# Patient Record
Sex: FEMALE | Race: WHITE | NOT HISPANIC OR LATINO | Employment: OTHER | URBAN - METROPOLITAN AREA
[De-identification: names, ages, dates, MRNs, and addresses within clinical notes are randomized per-mention and may not be internally consistent; named-entity substitution may affect disease eponyms.]

---

## 2017-05-10 DIAGNOSIS — I48.0 PAROXYSMAL ATRIAL FIBRILLATION: Primary | ICD-10-CM

## 2017-05-16 ENCOUNTER — HOSPITAL ENCOUNTER (OUTPATIENT)
Dept: CARDIOLOGY | Facility: HOSPITAL | Age: 72
Discharge: HOME | End: 2017-05-16
Payer: MEDICARE

## 2017-05-16 DIAGNOSIS — I48.0 PAROXYSMAL ATRIAL FIBRILLATION: ICD-10-CM

## 2017-05-16 LAB
ESTIMATED PA SYSTOLIC PRESSURE: 23.23
GLOBAL PERICARDIAL EFFUSION: ABNORMAL
MITRAL VALVE REGURGITATION: ABNORMAL
RETIRED EF AND QEF - SEE NOTES: 10 (ref 55–65)
TRICUSPID VALVE REGURGITATION: ABNORMAL

## 2017-05-16 PROCEDURE — 93306 TTE W/DOPPLER COMPLETE: CPT | Mod: PO

## 2017-05-16 PROCEDURE — 93306 TTE W/DOPPLER COMPLETE: CPT | Mod: 26,,, | Performed by: INTERNAL MEDICINE

## 2017-06-06 ENCOUNTER — HOSPITAL ENCOUNTER (OUTPATIENT)
Dept: RADIOLOGY | Facility: HOSPITAL | Age: 72
Discharge: HOME | End: 2017-06-06
Attending: SPECIALIST
Payer: MEDICARE

## 2017-06-06 ENCOUNTER — HOSPITAL ENCOUNTER (OUTPATIENT)
Dept: CARDIOLOGY | Facility: HOSPITAL | Age: 72
Discharge: HOME | End: 2017-06-06
Attending: SPECIALIST
Payer: MEDICARE

## 2017-06-06 DIAGNOSIS — I42.9 CARDIOMYOPATHY: ICD-10-CM

## 2017-06-06 DIAGNOSIS — I25.9 MYOCARDIAL ISCHEMIA: ICD-10-CM

## 2017-06-06 LAB — DIASTOLIC DYSFUNCTION: NO

## 2017-06-06 PROCEDURE — 78452 HT MUSCLE IMAGE SPECT MULT: CPT | Mod: TC,PO

## 2017-06-06 PROCEDURE — 93016 CV STRESS TEST SUPVJ ONLY: CPT | Mod: ,,, | Performed by: INTERNAL MEDICINE

## 2017-06-06 PROCEDURE — 93018 CV STRESS TEST I&R ONLY: CPT | Mod: ,,, | Performed by: INTERNAL MEDICINE

## 2017-06-06 RX ORDER — REGADENOSON 0.08 MG/ML
INJECTION, SOLUTION INTRAVENOUS
Status: DISPENSED
Start: 2017-06-06 | End: 2017-06-06

## 2017-06-30 ENCOUNTER — TELEPHONE (OUTPATIENT)
Dept: CARDIOLOGY | Facility: CLINIC | Age: 72
End: 2017-06-30

## 2017-06-30 NOTE — TELEPHONE ENCOUNTER
"Contacted patient.  [facility] for abnormal stress test, depressed EF.  Patient stated she does not want a consultation appointment at [place] nor [facility] and would like to proceed with procedure.  Informed patient she will be contacted with a date/time of procedure at [facility] in [place].  Patient stated she would like to be "put to sleep" for this procedure and does not want to feel any pain due to stories she has heard from people that has experienced this.  Patient voiced understanding.  "

## 2017-07-03 ENCOUNTER — TELEPHONE (OUTPATIENT)
Dept: CARDIOLOGY | Facility: CLINIC | Age: 72
End: 2017-07-03

## 2017-07-03 NOTE — TELEPHONE ENCOUNTER
----- Message from Monisha Bhatt sent at 7/3/2017  1:29 PM CDT -----  Contact: [phone]  Patient states she was told to schedule an angio gram and do not want to see the dr. She just want to schedule the angio gram

## 2017-07-06 ENCOUNTER — LAB VISIT (OUTPATIENT)
Dept: LAB | Facility: HOSPITAL | Age: 72
End: 2017-07-06
Attending: SPECIALIST
Payer: MEDICARE

## 2017-07-06 DIAGNOSIS — I48.0 PAROXYSMAL ATRIAL FIBRILLATION: Primary | ICD-10-CM

## 2017-07-06 LAB
INR PPP: 2.1
PROTHROMBIN TIME: 22.8 SEC

## 2017-07-06 PROCEDURE — 36415 COLL VENOUS BLD VENIPUNCTURE: CPT | Mod: PO

## 2017-07-06 PROCEDURE — 85610 PROTHROMBIN TIME: CPT | Mod: PO

## 2017-07-10 ENCOUNTER — TELEPHONE (OUTPATIENT)
Dept: CARDIOLOGY | Facility: CLINIC | Age: 72
End: 2017-07-10

## 2017-07-10 DIAGNOSIS — I25.5 ISCHEMIC CARDIOMYOPATHY: Primary | ICD-10-CM

## 2017-07-10 NOTE — TELEPHONE ENCOUNTER
Returned call, spoke with patient.  Patient asked me to disregard the call she will wait until Dr Lakhani's office call her.

## 2017-07-10 NOTE — TELEPHONE ENCOUNTER
----- Message from Peggy Leeanna sent at 7/10/2017  3:28 PM CDT -----  Contact: [phone] or [phone] /self   Patient would like to speak with you about scheduling angiogram. She was referred by her doctor to Dr Ochoa but has decided to go with a different doctor. Please advise.

## 2017-07-11 DIAGNOSIS — R94.39 ABNORMAL STRESS TEST: Primary | ICD-10-CM

## 2017-07-11 RX ORDER — DIPHENHYDRAMINE HCL 50 MG
50 CAPSULE ORAL ONCE
Status: CANCELLED | OUTPATIENT
Start: 2017-07-11 | End: 2017-07-11

## 2017-07-11 NOTE — PRE ADMISSION SCREENING
Called and spoke w pt. Pre procedure instructions given. Arrival time 730am on 7/19. Pt instructed to hold Coumadin x4 days. Pt verbalized understanding and all questions answered.

## 2017-07-18 NOTE — PRE ADMISSION SCREENING
Called and spoke with pt, confirmed arrival time 730am and pt has been holding coumadin. All questions answered.

## 2017-07-19 ENCOUNTER — HOSPITAL ENCOUNTER (OUTPATIENT)
Facility: HOSPITAL | Age: 72
Discharge: HOME | End: 2017-07-19
Attending: INTERNAL MEDICINE | Admitting: INTERNAL MEDICINE
Payer: MEDICARE

## 2017-07-19 ENCOUNTER — SURGERY (OUTPATIENT)
Age: 72
End: 2017-07-19

## 2017-07-19 VITALS
TEMPERATURE: 98 F | BODY MASS INDEX: 32.1 KG/M2 | WEIGHT: 170 LBS | SYSTOLIC BLOOD PRESSURE: 94 MMHG | OXYGEN SATURATION: 97 % | RESPIRATION RATE: 14 BRPM | HEIGHT: 61 IN | HEART RATE: 110 BPM | DIASTOLIC BLOOD PRESSURE: 50 MMHG

## 2017-07-19 DIAGNOSIS — I42.8 NICM (NONISCHEMIC CARDIOMYOPATHY): Primary | ICD-10-CM

## 2017-07-19 DIAGNOSIS — R94.39 ABNORMAL STRESS TEST: ICD-10-CM

## 2017-07-19 DIAGNOSIS — N39.0 URINARY TRACT INFECTION: ICD-10-CM

## 2017-07-19 LAB
ANION GAP SERPL CALC-SCNC: 11 MMOL/L
BASOPHILS # BLD AUTO: 0.02 K/UL
BASOPHILS NFR BLD: 0.2 %
BUN SERPL-MCNC: 35 MG/DL
CALCIUM SERPL-MCNC: 10.1 MG/DL
CHLORIDE SERPL-SCNC: 99 MMOL/L
CO2 SERPL-SCNC: 27 MMOL/L
CREAT SERPL-MCNC: 1.5 MG/DL
DIFFERENTIAL METHOD: ABNORMAL
EOSINOPHIL # BLD AUTO: 0.1 K/UL
EOSINOPHIL NFR BLD: 1.2 %
ERYTHROCYTE [DISTWIDTH] IN BLOOD BY AUTOMATED COUNT: 14 %
EST. GFR  (AFRICAN AMERICAN): 40 ML/MIN/1.73 M^2
EST. GFR  (NON AFRICAN AMERICAN): 34 ML/MIN/1.73 M^2
GLUCOSE SERPL-MCNC: 168 MG/DL
HCT VFR BLD AUTO: 48 %
HGB BLD-MCNC: 16.4 G/DL
INR PPP: 1.2
LYMPHOCYTES # BLD AUTO: 3.3 K/UL
LYMPHOCYTES NFR BLD: 28 %
MCH RBC QN AUTO: 30.6 PG
MCHC RBC AUTO-ENTMCNC: 34.2 %
MCV RBC AUTO: 90 FL
MONOCYTES # BLD AUTO: 0.8 K/UL
MONOCYTES NFR BLD: 6.9 %
NEUTROPHILS # BLD AUTO: 7.4 K/UL
NEUTROPHILS NFR BLD: 63.5 %
PLATELET # BLD AUTO: 216 K/UL
PMV BLD AUTO: 10 FL
POTASSIUM SERPL-SCNC: 4.9 MMOL/L
PROTHROMBIN TIME: 12.4 SEC
RBC # BLD AUTO: 5.36 M/UL
SODIUM SERPL-SCNC: 137 MMOL/L
WBC # BLD AUTO: 11.66 K/UL

## 2017-07-19 PROCEDURE — 93005 ELECTROCARDIOGRAM TRACING: CPT

## 2017-07-19 PROCEDURE — 85025 COMPLETE CBC W/AUTO DIFF WBC: CPT

## 2017-07-19 PROCEDURE — 63600175 PHARM REV CODE 636 W HCPCS

## 2017-07-19 PROCEDURE — 99152 MOD SED SAME PHYS/QHP 5/>YRS: CPT

## 2017-07-19 PROCEDURE — 25500020 PHARM REV CODE 255

## 2017-07-19 PROCEDURE — 25000003 PHARM REV CODE 250

## 2017-07-19 PROCEDURE — 99152 MOD SED SAME PHYS/QHP 5/>YRS: CPT | Mod: ,,, | Performed by: INTERNAL MEDICINE

## 2017-07-19 PROCEDURE — 85610 PROTHROMBIN TIME: CPT

## 2017-07-19 PROCEDURE — 80048 BASIC METABOLIC PNL TOTAL CA: CPT

## 2017-07-19 PROCEDURE — 25000003 PHARM REV CODE 250: Performed by: INTERNAL MEDICINE

## 2017-07-19 PROCEDURE — 93458 L HRT ARTERY/VENTRICLE ANGIO: CPT | Mod: 26,,, | Performed by: INTERNAL MEDICINE

## 2017-07-19 PROCEDURE — 27200085 CATH LAB PROCEDURE

## 2017-07-19 RX ORDER — DIPHENHYDRAMINE HCL 50 MG
50 CAPSULE ORAL ONCE
Status: DISCONTINUED | OUTPATIENT
Start: 2017-07-19 | End: 2017-07-19 | Stop reason: HOSPADM

## 2017-07-19 RX ORDER — ACETAMINOPHEN 325 MG/1
650 TABLET ORAL EVERY 6 HOURS PRN
Status: DISCONTINUED | OUTPATIENT
Start: 2017-07-19 | End: 2017-07-19 | Stop reason: HOSPADM

## 2017-07-19 RX ORDER — ONDANSETRON 2 MG/ML
4 INJECTION INTRAMUSCULAR; INTRAVENOUS EVERY 12 HOURS PRN
Status: DISCONTINUED | OUTPATIENT
Start: 2017-07-19 | End: 2017-07-19 | Stop reason: HOSPADM

## 2017-07-19 RX ORDER — SODIUM CHLORIDE 9 MG/ML
5 INJECTION, SOLUTION INTRAVENOUS CONTINUOUS
Status: DISCONTINUED | OUTPATIENT
Start: 2017-07-19 | End: 2017-07-19 | Stop reason: HOSPADM

## 2017-07-19 RX ADMIN — SODIUM CHLORIDE 5 ML/KG/HR: 0.9 INJECTION, SOLUTION INTRAVENOUS at 11:07

## 2017-07-19 NOTE — DISCHARGE SUMMARY
[facility]  Discharge Summary      Admit Date: 7/19/2017    Discharge Date and Time:  07/19/2017 12:15 PM    Attending Physician: Chris Ochoa MD     Reason for Admission: [facility]    Procedures Performed: Procedure(s) (LRB):  HEART CATH-LEFT (N/A)    Hospital Course (synopsis of major diagnoses, care, treatment, and services provided during the course of the hospital stay): The patient was taken to the cath lab and LHC revealed normal coronaries with severely depressed LV EF. Please see full report for details. No immediate post procedure complications and pt d/c'd home. Pt to f/u with Dr Whitlock for management of AFib and NICM.       Final Diagnoses:    Principal Problem: NICM   Secondary Diagnoses:   Active Hospital Problems    Diagnosis  POA    Abnormal stress test [R94.39]  Yes     Priority: High      Resolved Hospital Problems    Diagnosis Date Resolved POA   No resolved problems to display.   Afib  Chronic anticoagulation  HLD  DM    Discharged Condition: good    Disposition: Home or Self Care    Follow Up/Patient Instructions:     Medications:  Reconciled Home Medications:   Current Discharge Medication List      CONTINUE these medications which have NOT CHANGED    Details   CALCIUM & MAGNESIUM CARBONATES ORAL Take by mouth.      diclofenac sodium (VOLTAREN) 1 % Gel Apply 2 g topically every 6 (six) hours as needed. For pain.  Qty: 100 g, Refills: 1    Associated Diagnoses: Achilles tendonitis; Plantar fasciitis      INSULIN ASPART (NOVOLOG SUBQ) Inject into the skin.      INSULIN GLARGINE,HUM.REC.ANLOG (LANTUS SUBQ) Inject 40 Units into the skin once daily at 6am. Takes med at 6pm.      pravastatin (PRAVACHOL) 10 MG tablet Take 10 mg by mouth once daily.      propafenone (RHTHYMOL) 150 MG Tab Take 150 mg by mouth 3 (three) times daily.      tramadol 50 mg TbDL Take 50 mg by mouth as needed.      warfarin (COUMADIN) 3 MG tablet Take 3 mg by mouth Daily. Alternate 1 to 1/2 tab qd              Discharge Procedure Orders  Diet general   Order Specific Question Answer Comments   Additional restrictions: Cardiac (Low Na/Chol)      Activity as tolerated

## 2017-07-19 NOTE — H&P
[facility]  Cardiology  History and Physical     Patient Name: Yajaira Terry  MRN: [id number]  Admission Date: 2017  Code Status: No Order   Attending Provider: Chris Ochoa MD   Primary Care Physician: Savanna Whitlock MD  Principal Problem:<principal problem not specified>    Patient information was obtained from patient.     Subjective:     Chief Complaint:  Abnormal stress test     HPI:   73 y/o female with AFib on propafenone and chronic anticoagulation with coumadin, DM, HLD, tobacco use who presents for [facility]. Had 2DE with EF 10% with moderate MR with LOUISE 5.1 cm, had nuclear SPECT with infarct in the mid to apical anterior wall the LV, no ischemia, akinesis of the anteroseptal wall, mild hypokinesis of the inferior and lateral wall with depressed LV EF of 23%. She has had SOB and intermittent PND. Denies palps, syncope.     Past Medical History:   Diagnosis Date    Atrial fibrillation     Diabetes mellitus     Dyslipidemia     Osteoporosis, unspecified     Tobacco use     Urinary tract infection     Vaginal infection        Past Surgical History:   Procedure Laterality Date     SECTION          HYSTERECTOMY       - partial    INCONTINENCE SURGERY      KNEE SURGERY         Review of patient's allergies indicates:   Allergen Reactions    Sulfa (sulfonamide antibiotics)        No current facility-administered medications on file prior to encounter.      Current Outpatient Prescriptions on File Prior to Encounter   Medication Sig    CALCIUM & MAGNESIUM CARBONATES ORAL Take by mouth.    diclofenac sodium (VOLTAREN) 1 % Gel Apply 2 g topically every 6 (six) hours as needed. For pain.    INSULIN ASPART (NOVOLOG SUBQ) Inject into the skin.    INSULIN GLARGINE,HUM.REC.ANLOG (LANTUS SUBQ) Inject 40 Units into the skin once daily at 6am. Takes med at 6pm.    pravastatin (PRAVACHOL) 10 MG tablet Take 10 mg by mouth once daily.    propafenone (RHTHYMOL)  150 MG Tab Take 150 mg by mouth 3 (three) times daily.    tramadol 50 mg TbDL Take 50 mg by mouth as needed.    warfarin (COUMADIN) 3 MG tablet Take 3 mg by mouth Daily. Alternate 1 to 1/2 tab qd     Family History     Problem Relation (Age of Onset)    Kidney disease Maternal Grandmother        Social History Main Topics    Smoking status: Former Smoker     Quit date: 5/19/2017    Smokeless tobacco: Never Used    Alcohol use No    Drug use: No    Sexual activity: Not Currently     Review of Systems   Constitution: Negative for weakness and malaise/fatigue.   HENT: Negative for congestion and headaches.    Eyes: Negative for blurred vision.   Cardiovascular: Positive for dyspnea on exertion and paroxysmal nocturnal dyspnea. Negative for chest pain, claudication, cyanosis, irregular heartbeat, near-syncope, palpitations and syncope.   Respiratory: Positive for shortness of breath.    Endocrine: Negative for polyuria.   Hematologic/Lymphatic: Negative for bleeding problem.   Skin: Negative for itching and rash.   Musculoskeletal: Negative for joint swelling, muscle cramps and muscle weakness.   Gastrointestinal: Negative for abdominal pain, hematemesis, hematochezia, melena, nausea and vomiting.   Genitourinary: Negative for dysuria and hematuria.   Neurological: Negative for dizziness, focal weakness, light-headedness and loss of balance.   Psychiatric/Behavioral: Negative for depression. The patient is not nervous/anxious.      Objective:     Vital Signs (Most Recent):  Temp: 98 °F (36.7 °C) (07/19/17 0755)  Pulse: 107 (07/19/17 1130)  Resp: 20 (07/19/17 1130)  BP: (!) 89/64 (07/19/17 1130)  SpO2: 96 % (07/19/17 1130) Vital Signs (24h Range):  Temp:  [98 °F (36.7 °C)] 98 °F (36.7 °C)  Pulse:  [] 107  Resp:  [15-24] 20  SpO2:  [93 %-96 %] 96 %  BP: (74-91)/(50-64) 89/64     Weight: 77.1 kg (170 lb)  Body mass index is 32.12 kg/m².    SpO2: 96 %  O2 Device (Oxygen Therapy): room air    No intake or output  data in the 24 hours ending 07/19/17 1154    Lines/Drains/Airways     Peripheral Intravenous Line                 Peripheral IV - Single Lumen 07/19/17 0814 Left Antecubital less than 1 day                Physical Exam   Constitutional: She is oriented to person, place, and time. She appears well-developed and well-nourished.   HENT:   Head: Normocephalic and atraumatic.   Neck: Neck supple. No JVD present.   Cardiovascular: An irregularly irregular rhythm present. Tachycardia present.    Murmur heard.   Systolic murmur is present with a grade of 2/6   Pulses:       Carotid pulses are 2+ on the right side, and 2+ on the left side.       Radial pulses are 2+ on the right side, and 2+ on the left side.        Femoral pulses are 2+ on the right side, and 2+ on the left side.       Dorsalis pedis pulses are 2+ on the right side, and 2+ on the left side.        Posterior tibial pulses are 2+ on the right side, and 2+ on the left side.   Pulmonary/Chest: Effort normal and breath sounds normal.   Abdominal: Soft. Bowel sounds are normal.   Musculoskeletal: She exhibits no edema or tenderness.   Neurological: She is alert and oriented to person, place, and time.   Skin: Skin is warm and dry.   Psychiatric: She has a normal mood and affect. Her behavior is normal. Thought content normal.       Significant Labs:   BMP:   Recent Labs  Lab 07/19/17  0755   *      K 4.9   CL 99   CO2 27   BUN 35*   CREATININE 1.5*   CALCIUM 10.1   , CMP   Recent Labs  Lab 07/19/17  0755      K 4.9   CL 99   CO2 27   *   BUN 35*   CREATININE 1.5*   CALCIUM 10.1   ANIONGAP 11   ESTGFRAFRICA 40*   EGFRNONAA 34*   , CBC   Recent Labs  Lab 07/19/17  0755   WBC 11.66   HGB 16.4*   HCT 48.0       and INR   Recent Labs  Lab 07/19/17  0755   INR 1.2       Assessment and Plan:     Active Diagnoses:    Diagnosis Date Noted POA    Abnormal stress test [R94.39] 07/19/2017 Yes      Problems Resolved During this Admission:     Diagnosis Date Noted Date Resolved POA     71 y/o female with hx and presentation as above. Will plan for LHC    -LHC (diagnostic only)  -RIght radial access with 4/5 slender  -FAINA, pigtail  -The procedure was discussed with the pt along with the risks/benefits and the pt voiced understanding. All questions were answered and written consents obtained.           VTE Risk Mitigation     None          Chris Ochoa MD  Cardiology   [facility]

## 2017-07-19 NOTE — NURSING
2cc air removed from right radial vasc band.  NO hematoma or bleeding noted.   Will continue to monitor

## 2017-07-19 NOTE — NURSING
Patient discharged to home as ordered. All discharge instructions, printed materials  given to patient.  Patient verbalized understanding of and agreement with all discharge instructions given.Pt d/c via wheelchair via private vehicle, (son).

## 2017-07-19 NOTE — H&P
Post Procedure Note: [facility]    The pt was brought to the cath lab and under sterile technique, Right radial access was obtained without difficulty under US guidance. Images were obtained in multiple views and normal coronaries with severely depressed LVEF noted. Please see full report for details. The pt tolerated the procedure well without complications. Radial band device used with successful hemostasis.     Vitals:    07/19/17 1100 07/19/17 1115 07/19/17 1130 07/19/17 1145   BP: (!) 91/50 (!) 89/54 (!) 89/64 (!) 95/51   BP Location: Left arm Left arm Left arm Left arm   Patient Position: Lying Lying Lying Lying   BP Method: Automatic Automatic Automatic Automatic   Pulse: 99 101 107 (!) 123   Resp: (!) 24 (!) 22 20 (!) 24   Temp:       TempSrc:       SpO2: 96% 96% 96% 95%   Weight:       Height:             Gen: NAD  Ext: 2+ radial pulse, no evidence of hematoma    Plan:  -Post cath care per protocol  -Evaluate and treat for NICM

## 2017-08-15 ENCOUNTER — LAB VISIT (OUTPATIENT)
Dept: LAB | Facility: HOSPITAL | Age: 72
End: 2017-08-15
Attending: SPECIALIST
Payer: MEDICARE

## 2017-08-15 DIAGNOSIS — I48.0 PAROXYSMAL ATRIAL FIBRILLATION: Primary | ICD-10-CM

## 2017-08-15 LAB
INR PPP: 1.7
PROTHROMBIN TIME: 18 SEC

## 2017-08-15 PROCEDURE — 36415 COLL VENOUS BLD VENIPUNCTURE: CPT | Mod: PO

## 2017-08-15 PROCEDURE — 85610 PROTHROMBIN TIME: CPT | Mod: PO

## 2017-10-25 ENCOUNTER — LAB VISIT (OUTPATIENT)
Dept: LAB | Facility: HOSPITAL | Age: 72
End: 2017-10-25
Attending: SPECIALIST
Payer: MEDICARE

## 2017-10-25 DIAGNOSIS — N39.0 URINARY TRACT INFECTIOUS DISEASE: ICD-10-CM

## 2017-10-25 DIAGNOSIS — Z79.01 LONG-TERM (CURRENT) USE OF ANTICOAGULANTS: Primary | ICD-10-CM

## 2017-10-25 LAB
BACTERIA #/AREA URNS AUTO: ABNORMAL /HPF
BILIRUB UR QL STRIP: NEGATIVE
CLARITY UR REFRACT.AUTO: ABNORMAL
COLOR UR AUTO: ABNORMAL
GLUCOSE UR QL STRIP: NEGATIVE
HGB UR QL STRIP: ABNORMAL
INR PPP: 4
KETONES UR QL STRIP: NEGATIVE
LEUKOCYTE ESTERASE UR QL STRIP: ABNORMAL
MICROSCOPIC COMMENT: ABNORMAL
NITRITE UR QL STRIP: POSITIVE
PH UR STRIP: 5 [PH] (ref 5–8)
PROT UR QL STRIP: ABNORMAL
PROTHROMBIN TIME: 43.3 SEC
RBC #/AREA URNS AUTO: 8 /HPF (ref 0–4)
SP GR UR STRIP: 1.01 (ref 1–1.03)
URN SPEC COLLECT METH UR: ABNORMAL
UROBILINOGEN UR STRIP-ACNC: NEGATIVE EU/DL
WBC #/AREA URNS AUTO: >100 /HPF (ref 0–5)

## 2017-10-25 PROCEDURE — 81000 URINALYSIS NONAUTO W/SCOPE: CPT | Mod: PO

## 2017-10-25 PROCEDURE — 87086 URINE CULTURE/COLONY COUNT: CPT | Mod: PO

## 2017-10-25 PROCEDURE — 85610 PROTHROMBIN TIME: CPT | Mod: PO

## 2017-10-25 PROCEDURE — 87088 URINE BACTERIA CULTURE: CPT | Mod: PO

## 2017-10-25 PROCEDURE — 87186 SC STD MICRODIL/AGAR DIL: CPT | Mod: PO

## 2017-10-25 PROCEDURE — 87077 CULTURE AEROBIC IDENTIFY: CPT | Mod: PO

## 2017-10-25 PROCEDURE — 36415 COLL VENOUS BLD VENIPUNCTURE: CPT | Mod: PO

## 2017-10-28 LAB — BACTERIA UR CULT: NORMAL

## 2017-11-02 ENCOUNTER — LAB VISIT (OUTPATIENT)
Dept: LAB | Facility: HOSPITAL | Age: 72
End: 2017-11-02
Attending: SPECIALIST
Payer: MEDICARE

## 2017-11-02 DIAGNOSIS — I48.91 ATRIAL FIBRILLATION: Primary | ICD-10-CM

## 2017-11-02 LAB
INR PPP: 1.3
PROTHROMBIN TIME: 13.9 SEC

## 2017-11-02 PROCEDURE — 85610 PROTHROMBIN TIME: CPT | Mod: PO

## 2017-11-02 PROCEDURE — 36415 COLL VENOUS BLD VENIPUNCTURE: CPT | Mod: PO

## 2018-01-12 ENCOUNTER — LAB VISIT (OUTPATIENT)
Dept: LAB | Facility: HOSPITAL | Age: 73
End: 2018-01-12
Attending: SPECIALIST
Payer: MEDICARE

## 2018-01-12 DIAGNOSIS — I48.91 ATRIAL FIBRILLATION: Primary | ICD-10-CM

## 2018-01-12 LAB
INR PPP: 4.4
PROTHROMBIN TIME: 47 SEC

## 2018-01-12 PROCEDURE — 85610 PROTHROMBIN TIME: CPT | Mod: PO

## 2018-01-12 PROCEDURE — 36415 COLL VENOUS BLD VENIPUNCTURE: CPT | Mod: PO

## 2018-01-22 ENCOUNTER — LAB VISIT (OUTPATIENT)
Dept: LAB | Facility: HOSPITAL | Age: 73
End: 2018-01-22
Attending: SPECIALIST
Payer: MEDICARE

## 2018-01-22 DIAGNOSIS — I48.91 ATRIAL FIBRILLATION: Primary | ICD-10-CM

## 2018-01-22 LAB
INR PPP: 1.9
PROTHROMBIN TIME: 20.8 SEC

## 2018-01-22 PROCEDURE — 36415 COLL VENOUS BLD VENIPUNCTURE: CPT | Mod: PO

## 2018-01-22 PROCEDURE — 85610 PROTHROMBIN TIME: CPT | Mod: PO

## 2018-02-07 ENCOUNTER — LAB VISIT (OUTPATIENT)
Dept: LAB | Facility: HOSPITAL | Age: 73
End: 2018-02-07
Attending: SPECIALIST
Payer: MEDICARE

## 2018-02-07 DIAGNOSIS — I48.91 ATRIAL FIBRILLATION: Primary | ICD-10-CM

## 2018-02-07 LAB
INR PPP: 1.4
PROTHROMBIN TIME: 15.8 SEC

## 2018-02-07 PROCEDURE — 85610 PROTHROMBIN TIME: CPT | Mod: PO

## 2018-02-07 PROCEDURE — 36415 COLL VENOUS BLD VENIPUNCTURE: CPT | Mod: PO

## 2018-03-09 ENCOUNTER — LAB VISIT (OUTPATIENT)
Dept: LAB | Facility: HOSPITAL | Age: 73
End: 2018-03-09
Attending: SPECIALIST
Payer: MEDICARE

## 2018-03-09 DIAGNOSIS — I48.91 ATRIAL FIBRILLATION: Primary | ICD-10-CM

## 2018-03-09 LAB
INR PPP: 1.4
PROTHROMBIN TIME: 15.6 SEC

## 2018-03-09 PROCEDURE — 85610 PROTHROMBIN TIME: CPT | Mod: PO

## 2018-03-09 PROCEDURE — 36415 COLL VENOUS BLD VENIPUNCTURE: CPT | Mod: PO

## 2018-04-09 ENCOUNTER — LAB VISIT (OUTPATIENT)
Dept: LAB | Facility: HOSPITAL | Age: 73
End: 2018-04-09
Attending: SPECIALIST
Payer: MEDICARE

## 2018-04-09 DIAGNOSIS — I48.91 ATRIAL FIBRILLATION: ICD-10-CM

## 2018-04-09 LAB
ALBUMIN SERPL BCP-MCNC: 4.1 G/DL
ALP SERPL-CCNC: 138 U/L
ALT SERPL W/O P-5'-P-CCNC: 27 U/L
ANION GAP SERPL CALC-SCNC: 11 MMOL/L
AST SERPL-CCNC: 24 U/L
BASOPHILS # BLD AUTO: 0.03 K/UL
BASOPHILS NFR BLD: 0.4 %
BILIRUB SERPL-MCNC: 0.8 MG/DL
BUN SERPL-MCNC: 23 MG/DL
CALCIUM SERPL-MCNC: 9.4 MG/DL
CHLORIDE SERPL-SCNC: 104 MMOL/L
CHOLEST SERPL-MCNC: 143 MG/DL
CHOLEST/HDLC SERPL: 3.9 {RATIO}
CO2 SERPL-SCNC: 29 MMOL/L
CREAT SERPL-MCNC: 0.94 MG/DL
DIFFERENTIAL METHOD: ABNORMAL
EOSINOPHIL # BLD AUTO: 0.2 K/UL
EOSINOPHIL NFR BLD: 2.2 %
ERYTHROCYTE [DISTWIDTH] IN BLOOD BY AUTOMATED COUNT: 12.3 %
EST. GFR  (AFRICAN AMERICAN): >60 ML/MIN/1.73 M^2
EST. GFR  (NON AFRICAN AMERICAN): >60 ML/MIN/1.73 M^2
ESTIMATED AVG GLUCOSE: 192 MG/DL
GLUCOSE SERPL-MCNC: 164 MG/DL
HBA1C MFR BLD HPLC: 8.3 %
HCT VFR BLD AUTO: 46.5 %
HDLC SERPL-MCNC: 37 MG/DL
HDLC SERPL: 25.9 %
HGB BLD-MCNC: 15.3 G/DL
INR PPP: 1.6
LDLC SERPL CALC-MCNC: 77 MG/DL
LYMPHOCYTES # BLD AUTO: 2.1 K/UL
LYMPHOCYTES NFR BLD: 26.4 %
MCH RBC QN AUTO: 31.2 PG
MCHC RBC AUTO-ENTMCNC: 32.9 G/DL
MCV RBC AUTO: 95 FL
MONOCYTES # BLD AUTO: 0.6 K/UL
MONOCYTES NFR BLD: 7.6 %
NEUTROPHILS # BLD AUTO: 4.9 K/UL
NEUTROPHILS NFR BLD: 63.1 %
NONHDLC SERPL-MCNC: 106 MG/DL
NT-PROBNP: 684 PG/ML
PLATELET # BLD AUTO: 233 K/UL
PMV BLD AUTO: 9.9 FL
POTASSIUM SERPL-SCNC: 4.4 MMOL/L
PROT SERPL-MCNC: 7.3 G/DL
PROTHROMBIN TIME: 17.1 SEC
RBC # BLD AUTO: 4.9 M/UL
SODIUM SERPL-SCNC: 144 MMOL/L
TRIGL SERPL-MCNC: 145 MG/DL
WBC # BLD AUTO: 7.81 K/UL

## 2018-04-09 PROCEDURE — 83880 ASSAY OF NATRIURETIC PEPTIDE: CPT | Mod: PO

## 2018-04-09 PROCEDURE — 85610 PROTHROMBIN TIME: CPT | Mod: PO

## 2018-04-09 PROCEDURE — 85025 COMPLETE CBC W/AUTO DIFF WBC: CPT | Mod: PO

## 2018-04-09 PROCEDURE — 80061 LIPID PANEL: CPT | Mod: PO

## 2018-04-09 PROCEDURE — 80053 COMPREHEN METABOLIC PANEL: CPT | Mod: PO

## 2018-04-09 PROCEDURE — 83036 HEMOGLOBIN GLYCOSYLATED A1C: CPT

## 2018-04-09 PROCEDURE — 36415 COLL VENOUS BLD VENIPUNCTURE: CPT | Mod: PO

## 2018-05-08 ENCOUNTER — LAB VISIT (OUTPATIENT)
Dept: LAB | Facility: HOSPITAL | Age: 73
End: 2018-05-08
Attending: SPECIALIST
Payer: MEDICARE

## 2018-05-08 DIAGNOSIS — I48.91 UNSPECIFIED ATRIAL FIBRILLATION: Primary | ICD-10-CM

## 2018-05-08 LAB
INR PPP: 1.6
PROTHROMBIN TIME: 17.7 SEC

## 2018-05-08 PROCEDURE — 85610 PROTHROMBIN TIME: CPT | Mod: PO

## 2018-05-08 PROCEDURE — 36415 COLL VENOUS BLD VENIPUNCTURE: CPT | Mod: PO

## 2018-06-08 ENCOUNTER — LAB VISIT (OUTPATIENT)
Dept: LAB | Facility: HOSPITAL | Age: 73
End: 2018-06-08
Attending: SPECIALIST
Payer: MEDICARE

## 2018-06-08 DIAGNOSIS — I48.91 UNSPECIFIED ATRIAL FIBRILLATION: Primary | ICD-10-CM

## 2018-06-08 DIAGNOSIS — I48.91 ATRIAL FIBRILLATION: ICD-10-CM

## 2018-06-08 LAB
INR PPP: 1.5
PROTHROMBIN TIME: 16.5 SEC

## 2018-06-08 PROCEDURE — 85610 PROTHROMBIN TIME: CPT | Mod: PO

## 2018-06-08 PROCEDURE — 36415 COLL VENOUS BLD VENIPUNCTURE: CPT | Mod: PO

## 2018-07-12 ENCOUNTER — LAB VISIT (OUTPATIENT)
Dept: LAB | Facility: HOSPITAL | Age: 73
End: 2018-07-12
Attending: SPECIALIST
Payer: MEDICARE

## 2018-07-12 DIAGNOSIS — I48.91 ATRIAL FIBRILLATION: Primary | ICD-10-CM

## 2018-07-12 LAB
INR PPP: 1.4
PROTHROMBIN TIME: 15 SEC

## 2018-07-12 PROCEDURE — 85610 PROTHROMBIN TIME: CPT | Mod: PO

## 2018-07-12 PROCEDURE — 36415 COLL VENOUS BLD VENIPUNCTURE: CPT | Mod: PO

## 2018-08-14 ENCOUNTER — LAB VISIT (OUTPATIENT)
Dept: LAB | Facility: HOSPITAL | Age: 73
End: 2018-08-14
Attending: SPECIALIST
Payer: MEDICARE

## 2018-08-14 DIAGNOSIS — I48.91 UNSPECIFIED ATRIAL FIBRILLATION: Primary | ICD-10-CM

## 2018-08-14 LAB
INR PPP: 1.4
PROTHROMBIN TIME: 15.5 SEC

## 2018-08-14 PROCEDURE — 36415 COLL VENOUS BLD VENIPUNCTURE: CPT | Mod: PO

## 2018-08-14 PROCEDURE — 85610 PROTHROMBIN TIME: CPT | Mod: PO

## 2018-09-27 ENCOUNTER — LAB VISIT (OUTPATIENT)
Dept: LAB | Facility: HOSPITAL | Age: 73
End: 2018-09-27
Attending: SPECIALIST
Payer: MEDICARE

## 2018-09-27 DIAGNOSIS — I48.91 UNSPECIFIED ATRIAL FIBRILLATION: Primary | ICD-10-CM

## 2018-09-27 LAB
INR PPP: 1.9
PROTHROMBIN TIME: 20.7 SEC

## 2018-09-27 PROCEDURE — 85610 PROTHROMBIN TIME: CPT | Mod: PO

## 2018-09-27 PROCEDURE — 36415 COLL VENOUS BLD VENIPUNCTURE: CPT | Mod: PO

## 2018-10-30 ENCOUNTER — LAB VISIT (OUTPATIENT)
Dept: LAB | Facility: HOSPITAL | Age: 73
End: 2018-10-30
Attending: SPECIALIST
Payer: MEDICARE

## 2018-10-30 DIAGNOSIS — I48.91 UNSPECIFIED ATRIAL FIBRILLATION: Primary | ICD-10-CM

## 2018-10-30 LAB
INR PPP: 2.4
PROTHROMBIN TIME: 26.1 SEC

## 2018-10-30 PROCEDURE — 36415 COLL VENOUS BLD VENIPUNCTURE: CPT | Mod: PO

## 2018-10-30 PROCEDURE — 85610 PROTHROMBIN TIME: CPT | Mod: PO

## 2018-11-27 ENCOUNTER — LAB VISIT (OUTPATIENT)
Dept: LAB | Facility: HOSPITAL | Age: 73
End: 2018-11-27
Attending: SPECIALIST
Payer: MEDICARE

## 2018-11-27 DIAGNOSIS — I48.91 ATRIAL FIBRILLATION: Primary | ICD-10-CM

## 2018-11-27 LAB
INR PPP: 2.3
PROTHROMBIN TIME: 25.2 SEC

## 2018-11-27 PROCEDURE — 85610 PROTHROMBIN TIME: CPT | Mod: PO

## 2018-11-27 PROCEDURE — 36415 COLL VENOUS BLD VENIPUNCTURE: CPT | Mod: PO

## 2019-01-03 ENCOUNTER — LAB VISIT (OUTPATIENT)
Dept: LAB | Facility: HOSPITAL | Age: 74
End: 2019-01-03
Attending: SPECIALIST
Payer: MEDICARE

## 2019-01-03 DIAGNOSIS — I48.91 ATRIAL FIBRILLATION: Primary | ICD-10-CM

## 2019-01-03 LAB
INR PPP: 1.7
PROTHROMBIN TIME: 18.8 SEC

## 2019-01-03 PROCEDURE — 36415 COLL VENOUS BLD VENIPUNCTURE: CPT | Mod: PO,ER

## 2019-01-03 PROCEDURE — 85610 PROTHROMBIN TIME: CPT | Mod: PO,ER

## 2019-02-05 ENCOUNTER — LAB VISIT (OUTPATIENT)
Dept: LAB | Facility: HOSPITAL | Age: 74
End: 2019-02-05
Attending: SPECIALIST
Payer: MEDICARE

## 2019-02-05 DIAGNOSIS — I48.91 UNSPECIFIED ATRIAL FIBRILLATION: Primary | ICD-10-CM

## 2019-02-05 LAB
INR PPP: 1.9
PROTHROMBIN TIME: 20.7 SEC

## 2019-02-05 PROCEDURE — 36415 COLL VENOUS BLD VENIPUNCTURE: CPT | Mod: PO

## 2019-02-05 PROCEDURE — 85610 PROTHROMBIN TIME: CPT | Mod: PO

## 2019-03-13 ENCOUNTER — LAB VISIT (OUTPATIENT)
Dept: LAB | Facility: HOSPITAL | Age: 74
End: 2019-03-13
Attending: SPECIALIST
Payer: MEDICARE

## 2019-03-13 DIAGNOSIS — E11.9 DIABETES: Primary | ICD-10-CM

## 2019-03-13 LAB
ALBUMIN SERPL BCP-MCNC: 4.2 G/DL
ALP SERPL-CCNC: 127 U/L
ALT SERPL W/O P-5'-P-CCNC: 15 U/L
ANION GAP SERPL CALC-SCNC: 8 MMOL/L
AST SERPL-CCNC: 22 U/L
BASOPHILS # BLD AUTO: 0.05 K/UL
BASOPHILS NFR BLD: 0.7 %
BILIRUB SERPL-MCNC: 0.9 MG/DL
BUN SERPL-MCNC: 18 MG/DL
CALCIUM SERPL-MCNC: 8.9 MG/DL
CHLORIDE SERPL-SCNC: 102 MMOL/L
CHOLEST SERPL-MCNC: 141 MG/DL
CHOLEST/HDLC SERPL: 3.6 {RATIO}
CO2 SERPL-SCNC: 29 MMOL/L
CREAT SERPL-MCNC: 0.93 MG/DL
DIFFERENTIAL METHOD: NORMAL
EOSINOPHIL # BLD AUTO: 0.2 K/UL
EOSINOPHIL NFR BLD: 2.3 %
ERYTHROCYTE [DISTWIDTH] IN BLOOD BY AUTOMATED COUNT: 13.1 %
EST. GFR  (AFRICAN AMERICAN): >60 ML/MIN/1.73 M^2
EST. GFR  (NON AFRICAN AMERICAN): >60 ML/MIN/1.73 M^2
GLUCOSE SERPL-MCNC: 140 MG/DL
HCT VFR BLD AUTO: 46.1 %
HDLC SERPL-MCNC: 39 MG/DL
HDLC SERPL: 27.7 %
HGB BLD-MCNC: 14.8 G/DL
LDLC SERPL CALC-MCNC: 75 MG/DL
LYMPHOCYTES # BLD AUTO: 2.2 K/UL
LYMPHOCYTES NFR BLD: 31.8 %
MCH RBC QN AUTO: 30.3 PG
MCHC RBC AUTO-ENTMCNC: 32.1 G/DL
MCV RBC AUTO: 94 FL
MONOCYTES # BLD AUTO: 0.5 K/UL
MONOCYTES NFR BLD: 7.5 %
NEUTROPHILS # BLD AUTO: 4 K/UL
NEUTROPHILS NFR BLD: 57.6 %
NONHDLC SERPL-MCNC: 102 MG/DL
NT-PROBNP: 869 PG/ML
PLATELET # BLD AUTO: 222 K/UL
PMV BLD AUTO: 9.5 FL
POTASSIUM SERPL-SCNC: 4.2 MMOL/L
PROT SERPL-MCNC: 7.3 G/DL
RBC # BLD AUTO: 4.89 M/UL
SODIUM SERPL-SCNC: 139 MMOL/L
TRIGL SERPL-MCNC: 135 MG/DL
WBC # BLD AUTO: 6.96 K/UL

## 2019-03-13 PROCEDURE — 83880 ASSAY OF NATRIURETIC PEPTIDE: CPT | Mod: PO

## 2019-03-13 PROCEDURE — 36415 COLL VENOUS BLD VENIPUNCTURE: CPT | Mod: PO

## 2019-03-13 PROCEDURE — 85025 COMPLETE CBC W/AUTO DIFF WBC: CPT | Mod: PO

## 2019-03-13 PROCEDURE — 80061 LIPID PANEL: CPT

## 2019-03-13 PROCEDURE — 80053 COMPREHEN METABOLIC PANEL: CPT | Mod: PO

## 2019-03-26 ENCOUNTER — LAB VISIT (OUTPATIENT)
Dept: LAB | Facility: HOSPITAL | Age: 74
End: 2019-03-26
Payer: MEDICARE

## 2019-03-26 DIAGNOSIS — I48.91 ATRIAL FIBRILLATION: Primary | ICD-10-CM

## 2019-03-26 LAB
INR PPP: 2.1 (ref 0.8–1.2)
PROTHROMBIN TIME: 22.6 SEC (ref 9–12.5)

## 2019-03-26 PROCEDURE — 36415 COLL VENOUS BLD VENIPUNCTURE: CPT | Mod: PO

## 2019-03-26 PROCEDURE — 85610 PROTHROMBIN TIME: CPT | Mod: PO

## 2019-05-07 ENCOUNTER — LAB VISIT (OUTPATIENT)
Dept: LAB | Facility: HOSPITAL | Age: 74
End: 2019-05-07
Attending: SPECIALIST
Payer: MEDICARE

## 2019-05-07 DIAGNOSIS — Z51.81 ADMISSION FOR LONG-TERM (CURRENT) USE OF ANTICOAGULANTS: Primary | ICD-10-CM

## 2019-05-07 DIAGNOSIS — Z79.01 ADMISSION FOR LONG-TERM (CURRENT) USE OF ANTICOAGULANTS: Primary | ICD-10-CM

## 2019-05-07 LAB
INR PPP: 1.7 (ref 0.8–1.2)
PROTHROMBIN TIME: 18.1 SEC (ref 9–12.5)

## 2019-05-07 PROCEDURE — 36415 COLL VENOUS BLD VENIPUNCTURE: CPT | Mod: PO

## 2019-05-07 PROCEDURE — 85610 PROTHROMBIN TIME: CPT | Mod: PO

## 2019-06-10 ENCOUNTER — LAB VISIT (OUTPATIENT)
Dept: LAB | Facility: HOSPITAL | Age: 74
End: 2019-06-10
Attending: SPECIALIST
Payer: MEDICARE

## 2019-06-10 DIAGNOSIS — Z79.01 LONG TERM (CURRENT) USE OF ANTICOAGULANTS: Primary | ICD-10-CM

## 2019-06-10 LAB
INR PPP: 2.1 (ref 0.8–1.2)
PROTHROMBIN TIME: 22.1 SEC (ref 9–12.5)

## 2019-06-10 PROCEDURE — 36415 COLL VENOUS BLD VENIPUNCTURE: CPT | Mod: PO

## 2019-06-10 PROCEDURE — 85610 PROTHROMBIN TIME: CPT | Mod: PO

## 2019-06-25 ENCOUNTER — APPOINTMENT (OUTPATIENT)
Dept: LAB | Facility: HOSPITAL | Age: 74
End: 2019-06-25
Attending: SPECIALIST
Payer: MEDICARE

## 2019-06-25 DIAGNOSIS — N39.0 URINARY TRACT INFECTION, SITE NOT SPECIFIED: Primary | ICD-10-CM

## 2019-06-25 PROCEDURE — 87088 URINE BACTERIA CULTURE: CPT | Mod: PO

## 2019-06-25 PROCEDURE — 87186 SC STD MICRODIL/AGAR DIL: CPT | Mod: PO

## 2019-06-25 PROCEDURE — 87086 URINE CULTURE/COLONY COUNT: CPT | Mod: PO

## 2019-06-25 PROCEDURE — 87077 CULTURE AEROBIC IDENTIFY: CPT | Mod: PO

## 2019-06-27 LAB — BACTERIA UR CULT: ABNORMAL

## 2019-07-06 ENCOUNTER — HOSPITAL ENCOUNTER (EMERGENCY)
Facility: HOSPITAL | Age: 74
Discharge: HOME | End: 2019-07-06
Attending: FAMILY MEDICINE
Payer: MEDICARE

## 2019-07-06 VITALS
BODY MASS INDEX: 33.79 KG/M2 | DIASTOLIC BLOOD PRESSURE: 60 MMHG | HEIGHT: 61 IN | SYSTOLIC BLOOD PRESSURE: 100 MMHG | RESPIRATION RATE: 18 BRPM | HEART RATE: 116 BPM | WEIGHT: 179 LBS | OXYGEN SATURATION: 99 % | TEMPERATURE: 98 F

## 2019-07-06 DIAGNOSIS — R31.9 URINARY TRACT INFECTION WITH HEMATURIA, SITE UNSPECIFIED: Primary | ICD-10-CM

## 2019-07-06 DIAGNOSIS — N39.0 URINARY TRACT INFECTION WITH HEMATURIA, SITE UNSPECIFIED: Primary | ICD-10-CM

## 2019-07-06 LAB
BACTERIA #/AREA URNS AUTO: ABNORMAL /HPF
BILIRUB UR QL STRIP: ABNORMAL
CLARITY UR REFRACT.AUTO: ABNORMAL
COLOR UR AUTO: ABNORMAL
GLUCOSE UR QL STRIP: ABNORMAL
HGB UR QL STRIP: ABNORMAL
HYALINE CASTS UR QL AUTO: 0 /LPF
KETONES UR QL STRIP: NEGATIVE
LEUKOCYTE ESTERASE UR QL STRIP: ABNORMAL
MICROSCOPIC COMMENT: ABNORMAL
NITRITE UR QL STRIP: POSITIVE
PH UR STRIP: 5 [PH] (ref 5–8)
PROT UR QL STRIP: ABNORMAL
RBC #/AREA URNS AUTO: 40 /HPF (ref 0–4)
SP GR UR STRIP: 1.02 (ref 1–1.03)
SQUAMOUS #/AREA URNS AUTO: ABNORMAL /HPF
URN SPEC COLLECT METH UR: ABNORMAL
UROBILINOGEN UR STRIP-ACNC: ABNORMAL EU/DL
WBC #/AREA URNS AUTO: >100 /HPF (ref 0–5)
YEAST UR QL AUTO: ABNORMAL

## 2019-07-06 PROCEDURE — 87086 URINE CULTURE/COLONY COUNT: CPT | Mod: ER

## 2019-07-06 PROCEDURE — 25000003 PHARM REV CODE 250: Mod: ER | Performed by: FAMILY MEDICINE

## 2019-07-06 PROCEDURE — 99284 EMERGENCY DEPT VISIT MOD MDM: CPT | Mod: ER

## 2019-07-06 PROCEDURE — 81000 URINALYSIS NONAUTO W/SCOPE: CPT | Mod: ER

## 2019-07-06 RX ORDER — CIPROFLOXACIN 500 MG/1
500 TABLET ORAL 2 TIMES DAILY
Qty: 20 TABLET | Refills: 0 | Status: SHIPPED | OUTPATIENT
Start: 2019-07-06 | End: 2019-07-16

## 2019-07-06 RX ORDER — PHENAZOPYRIDINE HYDROCHLORIDE 100 MG/1
200 TABLET, FILM COATED ORAL
Status: COMPLETED | OUTPATIENT
Start: 2019-07-06 | End: 2019-07-06

## 2019-07-06 RX ORDER — CIPROFLOXACIN 500 MG/1
500 TABLET ORAL
Status: COMPLETED | OUTPATIENT
Start: 2019-07-06 | End: 2019-07-06

## 2019-07-06 RX ORDER — PHENAZOPYRIDINE HYDROCHLORIDE 200 MG/1
200 TABLET, FILM COATED ORAL 3 TIMES DAILY
Qty: 6 TABLET | Refills: 0 | Status: SHIPPED | OUTPATIENT
Start: 2019-07-06 | End: 2021-03-26

## 2019-07-06 RX ADMIN — PHENAZOPYRIDINE HYDROCHLORIDE 200 MG: 100 TABLET ORAL at 07:07

## 2019-07-06 RX ADMIN — CIPROFLOXACIN HYDROCHLORIDE 500 MG: 500 TABLET, FILM COATED ORAL at 07:07

## 2019-07-07 NOTE — ED PROVIDER NOTES
Encounter Date: 2019       History     Chief Complaint   Patient presents with    Dysuria     Reports burning with urination and increased urinary frequency. Pt was treated for UTI 2 weeks ago. States she did not take the last abx tablet because she was saving it. Reports she took it today.      Patient complains of burning urination, frequency of urination, bladder pain and spasm since yesterday.  She took 1 Cipro this morning from left over pill from last prescription.  She is being treated with urinary tract infection 2 weeks ago by her PCP.  Patient says she is symptom free and then just started yesterday.  Denies fever or flank pain. No nausea or vomiting.    The history is provided by the patient.     Review of patient's allergies indicates:   Allergen Reactions    Sulfa (sulfonamide antibiotics)      Past Medical History:   Diagnosis Date    Atrial fibrillation     Diabetes mellitus     Dyslipidemia     Osteoporosis, unspecified     Tobacco use     Urinary tract infection     Vaginal infection      Past Surgical History:   Procedure Laterality Date     SECTION          HYSTERECTOMY       - partial    INCONTINENCE SURGERY      KNEE SURGERY       Family History   Problem Relation Age of Onset    Kidney disease Maternal Grandmother      Social History     Tobacco Use    Smoking status: Former Smoker     Last attempt to quit: 2017     Years since quittin.1    Smokeless tobacco: Never Used   Substance Use Topics    Alcohol use: No    Drug use: No     Review of Systems   Constitutional: Negative for activity change, appetite change, chills and fever.   HENT: Negative for congestion, ear discharge, rhinorrhea, sinus pressure, sinus pain, sore throat and trouble swallowing.    Eyes: Negative for photophobia, pain, discharge, redness, itching and visual disturbance.   Respiratory: Negative for cough, chest tightness, shortness of breath and wheezing.    Cardiovascular:  Negative for chest pain, palpitations and leg swelling.   Gastrointestinal: Negative for abdominal distention, abdominal pain, constipation, diarrhea, nausea and vomiting.   Genitourinary: Positive for dysuria and frequency. Negative for flank pain and hematuria.   Musculoskeletal: Negative for back pain, gait problem, neck pain and neck stiffness.   Skin: Negative for rash and wound.   Neurological: Negative for dizziness, tremors, seizures, syncope, speech difficulty, weakness, light-headedness, numbness and headaches.   Psychiatric/Behavioral: Negative for behavioral problems, confusion, hallucinations and sleep disturbance. The patient is not nervous/anxious.    All other systems reviewed and are negative.      Physical Exam     Initial Vitals [07/06/19 1848]   BP Pulse Resp Temp SpO2   100/60 (!) 116 18 98.4 °F (36.9 °C) 99 %      MAP       --         Physical Exam    Nursing note and vitals reviewed.  Constitutional: Vital signs are normal. She appears well-developed and well-nourished. She is active.   HENT:   Head: Normocephalic and atraumatic.   Nose: Nose normal.   Mouth/Throat: Oropharynx is clear and moist.   Eyes: Conjunctivae, EOM and lids are normal. Pupils are equal, round, and reactive to light.   Neck: Trachea normal, normal range of motion and full passive range of motion without pain. Neck supple. Normal range of motion present. No neck rigidity.   Cardiovascular: Normal rate, regular rhythm, S1 normal, S2 normal, normal heart sounds, intact distal pulses and normal pulses.   Pulmonary/Chest: Breath sounds normal. No respiratory distress. She has no wheezes. She has no rhonchi. She has no rales. She exhibits no tenderness.   Abdominal: Soft. Normal appearance and bowel sounds are normal. She exhibits no distension. There is no tenderness. There is no rebound and no guarding.   Musculoskeletal: Normal range of motion. She exhibits no edema or tenderness.   Lymphadenopathy:     She has no cervical  adenopathy.   Neurological: She is alert and oriented to person, place, and time. She has normal reflexes. No cranial nerve deficit or sensory deficit. GCS score is 15. GCS eye subscore is 4. GCS verbal subscore is 5. GCS motor subscore is 6.   Skin: Skin is warm and intact. Capillary refill takes less than 2 seconds. No abrasion, no bruising and no rash noted. No erythema.   Psychiatric: She has a normal mood and affect. Her speech is normal and behavior is normal. Judgment and thought content normal. She is not actively hallucinating. Cognition and memory are normal. She is attentive.         ED Course   Procedures  Labs Reviewed   URINALYSIS, REFLEX TO URINE CULTURE          Imaging Results    None          Medical Decision Making:   Initial Assessment:   Dysuria.  Since yesterday.  No fever.  Differential Diagnosis:   Urinary tract infection, cystitis, urethritis, pyelonephritis  Clinical Tests:   Lab Tests: Ordered and Reviewed       <> Summary of Lab: Urine showed significant white cell count, nitrates and leukocytes consistent with urinary tract infection.  ED Management:  Patient is being treated for urinary tract infection with Cipro.  And Pyridium.  Advised to get hydrated well and follow up with primary care physician in 2-3 days for culture reports.  Or to the ER if symptoms worsen like fever, chills.                      Clinical Impression:       ICD-10-CM ICD-9-CM   1. Urinary tract infection with hematuria, site unspecified N39.0 599.0    R31.9 599.70         Disposition:   Disposition: Discharged  Condition: Stable                        Louie Alvarez MD  07/07/19 0002

## 2019-07-08 LAB — BACTERIA UR CULT: NORMAL

## 2019-07-25 ENCOUNTER — LAB VISIT (OUTPATIENT)
Dept: LAB | Facility: HOSPITAL | Age: 74
End: 2019-07-25
Attending: SPECIALIST
Payer: MEDICARE

## 2019-07-25 DIAGNOSIS — Z79.01 LONG TERM CURRENT USE OF ANTICOAGULANT: ICD-10-CM

## 2019-07-25 DIAGNOSIS — N39.0 UTI (URINARY TRACT INFECTION): Primary | ICD-10-CM

## 2019-07-25 LAB
INR PPP: 1.9 (ref 0.8–1.2)
PROTHROMBIN TIME: 20.5 SEC (ref 9–12.5)

## 2019-07-25 PROCEDURE — 36415 COLL VENOUS BLD VENIPUNCTURE: CPT | Mod: PO

## 2019-07-25 PROCEDURE — 87086 URINE CULTURE/COLONY COUNT: CPT | Mod: PO

## 2019-07-25 PROCEDURE — 85610 PROTHROMBIN TIME: CPT | Mod: PO

## 2019-07-26 LAB — BACTERIA UR CULT: NORMAL

## 2019-08-29 ENCOUNTER — LAB VISIT (OUTPATIENT)
Dept: LAB | Facility: HOSPITAL | Age: 74
End: 2019-08-29
Attending: SPECIALIST
Payer: MEDICARE

## 2019-08-29 DIAGNOSIS — Z79.01 LONG TERM (CURRENT) USE OF ANTICOAGULANTS: Primary | ICD-10-CM

## 2019-08-29 LAB
INR PPP: 2.4 (ref 0.8–1.2)
PROTHROMBIN TIME: 25.4 SEC (ref 9–12.5)

## 2019-08-29 PROCEDURE — 85610 PROTHROMBIN TIME: CPT | Mod: PO

## 2019-08-29 PROCEDURE — 36415 COLL VENOUS BLD VENIPUNCTURE: CPT | Mod: PO

## 2019-09-16 DIAGNOSIS — N39.0 URINARY TRACT INFECTION: Primary | ICD-10-CM

## 2019-09-16 DIAGNOSIS — N39.0 URINARY TRACT INFECTION, SITE NOT SPECIFIED: ICD-10-CM

## 2019-09-19 ENCOUNTER — HOSPITAL ENCOUNTER (OUTPATIENT)
Dept: RADIOLOGY | Facility: HOSPITAL | Age: 74
Discharge: HOME | End: 2019-09-19
Attending: UROLOGY
Payer: MEDICARE

## 2019-09-19 DIAGNOSIS — N39.0 URINARY TRACT INFECTION, SITE NOT SPECIFIED: ICD-10-CM

## 2019-09-19 PROCEDURE — 76770 US EXAM ABDO BACK WALL COMP: CPT | Mod: TC,PO

## 2019-09-20 DIAGNOSIS — N28.9 DISORDER OF KIDNEY AND URETER: ICD-10-CM

## 2019-09-20 DIAGNOSIS — N28.89 OTHER SPECIFIED DISORDERS OF KIDNEY AND URETER: Primary | ICD-10-CM

## 2019-09-23 ENCOUNTER — LAB VISIT (OUTPATIENT)
Dept: LAB | Facility: HOSPITAL | Age: 74
End: 2019-09-23
Attending: SPECIALIST
Payer: MEDICARE

## 2019-09-23 DIAGNOSIS — N28.89 RENAL MASS: Primary | ICD-10-CM

## 2019-09-23 LAB
ANION GAP SERPL CALC-SCNC: 6 MMOL/L (ref 8–16)
BUN SERPL-MCNC: 27 MG/DL (ref 7–17)
CALCIUM SERPL-MCNC: 9.3 MG/DL (ref 8.7–10.5)
CHLORIDE SERPL-SCNC: 102 MMOL/L (ref 95–110)
CO2 SERPL-SCNC: 32 MMOL/L (ref 23–29)
CREAT SERPL-MCNC: 1.05 MG/DL (ref 0.5–1.4)
EST. GFR  (AFRICAN AMERICAN): >60 ML/MIN/1.73 M^2
EST. GFR  (NON AFRICAN AMERICAN): 52.1 ML/MIN/1.73 M^2
GLUCOSE SERPL-MCNC: 302 MG/DL (ref 70–110)
POTASSIUM SERPL-SCNC: 4.4 MMOL/L (ref 3.5–5.1)
SODIUM SERPL-SCNC: 140 MMOL/L (ref 136–145)

## 2019-09-23 PROCEDURE — 80048 BASIC METABOLIC PNL TOTAL CA: CPT | Mod: PO

## 2019-09-24 ENCOUNTER — HOSPITAL ENCOUNTER (OUTPATIENT)
Dept: RADIOLOGY | Facility: HOSPITAL | Age: 74
Discharge: HOME | End: 2019-09-24
Attending: UROLOGY
Payer: MEDICARE

## 2019-09-24 DIAGNOSIS — N28.9 DISORDER OF KIDNEY AND URETER: ICD-10-CM

## 2019-09-24 PROCEDURE — 25500020 PHARM REV CODE 255: Mod: PO | Performed by: UROLOGY

## 2019-09-24 PROCEDURE — 74178 CT ABD&PLV WO CNTR FLWD CNTR: CPT | Mod: TC,PO

## 2019-09-24 RX ADMIN — IOHEXOL 50 ML: 350 INJECTION, SOLUTION INTRAVENOUS at 02:09

## 2019-09-30 ENCOUNTER — LAB VISIT (OUTPATIENT)
Dept: LAB | Facility: HOSPITAL | Age: 74
End: 2019-09-30
Attending: UROLOGY
Payer: MEDICARE

## 2019-09-30 DIAGNOSIS — Z13.29 SCREENING FOR HYPOTHYROIDISM: ICD-10-CM

## 2019-09-30 DIAGNOSIS — N28.89 RENAL MASS: Primary | ICD-10-CM

## 2019-09-30 LAB
ALBUMIN SERPL BCP-MCNC: 4 G/DL (ref 3.5–5.2)
ALP SERPL-CCNC: 140 U/L (ref 38–126)
ALT SERPL W/O P-5'-P-CCNC: 15 U/L (ref 10–44)
ANION GAP SERPL CALC-SCNC: 7 MMOL/L (ref 8–16)
AST SERPL-CCNC: 26 U/L (ref 15–46)
BASOPHILS # BLD AUTO: 0.04 K/UL (ref 0–0.2)
BASOPHILS NFR BLD: 0.4 % (ref 0–1.9)
BILIRUB SERPL-MCNC: 0.9 MG/DL (ref 0.1–1)
BUN SERPL-MCNC: 22 MG/DL (ref 7–17)
CALCIUM SERPL-MCNC: 9.4 MG/DL (ref 8.7–10.5)
CHLORIDE SERPL-SCNC: 102 MMOL/L (ref 95–110)
CO2 SERPL-SCNC: 31 MMOL/L (ref 23–29)
CORTIS SERPL-MCNC: 5.6 UG/DL
CREAT SERPL-MCNC: 1.07 MG/DL (ref 0.5–1.4)
DIFFERENTIAL METHOD: ABNORMAL
EOSINOPHIL # BLD AUTO: 0.2 K/UL (ref 0–0.5)
EOSINOPHIL NFR BLD: 1.8 % (ref 0–8)
ERYTHROCYTE [DISTWIDTH] IN BLOOD BY AUTOMATED COUNT: 13.4 % (ref 11.5–14.5)
EST. GFR  (AFRICAN AMERICAN): 58.7 ML/MIN/1.73 M^2
EST. GFR  (NON AFRICAN AMERICAN): 50.9 ML/MIN/1.73 M^2
GLUCOSE SERPL-MCNC: 185 MG/DL (ref 70–110)
HCT VFR BLD AUTO: 47.1 % (ref 37–48.5)
HGB BLD-MCNC: 14.8 G/DL (ref 12–16)
INR PPP: 2.4 (ref 0.8–1.2)
LDH SERPL L TO P-CCNC: 261 U/L (ref 110–260)
LIPASE SERPL-CCNC: 97 U/L (ref 23–300)
LYMPHOCYTES # BLD AUTO: 2 K/UL (ref 1–4.8)
LYMPHOCYTES NFR BLD: 22.6 % (ref 18–48)
MCH RBC QN AUTO: 30.3 PG (ref 27–31)
MCHC RBC AUTO-ENTMCNC: 31.4 G/DL (ref 32–36)
MCV RBC AUTO: 96 FL (ref 82–98)
MONOCYTES # BLD AUTO: 0.7 K/UL (ref 0.3–1)
MONOCYTES NFR BLD: 7.2 % (ref 4–15)
NEUTROPHILS # BLD AUTO: 6.1 K/UL (ref 1.8–7.7)
NEUTROPHILS NFR BLD: 68 % (ref 38–73)
PLATELET # BLD AUTO: 199 K/UL (ref 150–350)
PMV BLD AUTO: 10.2 FL (ref 9.2–12.9)
POTASSIUM SERPL-SCNC: 4.8 MMOL/L (ref 3.5–5.1)
PROT SERPL-MCNC: 7.6 G/DL (ref 6–8.4)
PROTHROMBIN TIME: 25.3 SEC (ref 9–12.5)
RBC # BLD AUTO: 4.89 M/UL (ref 4–5.4)
SODIUM SERPL-SCNC: 140 MMOL/L (ref 136–145)
WBC # BLD AUTO: 9.04 K/UL (ref 3.9–12.7)

## 2019-09-30 PROCEDURE — 82533 TOTAL CORTISOL: CPT | Mod: PO

## 2019-09-30 PROCEDURE — 36415 COLL VENOUS BLD VENIPUNCTURE: CPT | Mod: PO

## 2019-09-30 PROCEDURE — 83690 ASSAY OF LIPASE: CPT | Mod: PO

## 2019-09-30 PROCEDURE — 85025 COMPLETE CBC W/AUTO DIFF WBC: CPT | Mod: PO

## 2019-09-30 PROCEDURE — 86803 HEPATITIS C AB TEST: CPT | Mod: PO

## 2019-09-30 PROCEDURE — 87340 HEPATITIS B SURFACE AG IA: CPT | Mod: PO

## 2019-09-30 PROCEDURE — 83615 LACTATE (LD) (LDH) ENZYME: CPT

## 2019-09-30 PROCEDURE — 80053 COMPREHEN METABOLIC PANEL: CPT | Mod: PO

## 2019-09-30 PROCEDURE — 85610 PROTHROMBIN TIME: CPT | Mod: PO

## 2019-10-01 LAB
HBV SURFACE AG SERPL QL IA: NEGATIVE
HCV AB SERPL QL IA: NEGATIVE

## 2019-10-02 DIAGNOSIS — N28.89 RENAL MASS: Primary | ICD-10-CM

## 2019-10-04 ENCOUNTER — HOSPITAL ENCOUNTER (OUTPATIENT)
Dept: RADIOLOGY | Facility: HOSPITAL | Age: 74
Discharge: HOME | End: 2019-10-04
Attending: UROLOGY
Payer: MEDICARE

## 2019-10-04 DIAGNOSIS — N28.89 RENAL MASS: ICD-10-CM

## 2019-10-04 PROCEDURE — 71260 CT THORAX DX C+: CPT | Mod: TC,PO

## 2019-10-04 PROCEDURE — 25500020 PHARM REV CODE 255: Mod: PO | Performed by: UROLOGY

## 2019-10-04 RX ADMIN — IOHEXOL 75 ML: 350 INJECTION, SOLUTION INTRAVENOUS at 02:10

## 2019-10-22 DIAGNOSIS — N64.4 BREAST PAIN, LEFT: Primary | ICD-10-CM

## 2019-10-24 ENCOUNTER — APPOINTMENT (OUTPATIENT)
Dept: LAB | Facility: HOSPITAL | Age: 74
End: 2019-10-24
Attending: SPECIALIST
Payer: MEDICARE

## 2019-10-24 DIAGNOSIS — Z13.29 SCREENING FOR HYPOTHYROIDISM: Primary | ICD-10-CM

## 2019-10-30 ENCOUNTER — HOSPITAL ENCOUNTER (OUTPATIENT)
Dept: RADIOLOGY | Facility: HOSPITAL | Age: 74
Discharge: HOME | End: 2019-10-30
Attending: SPECIALIST
Payer: MEDICARE

## 2019-10-30 DIAGNOSIS — N64.4 BREAST PAIN, LEFT: ICD-10-CM

## 2019-10-30 PROCEDURE — 77062 BREAST TOMOSYNTHESIS BI: CPT | Mod: TC,PO

## 2019-11-14 ENCOUNTER — LAB VISIT (OUTPATIENT)
Dept: LAB | Facility: HOSPITAL | Age: 74
End: 2019-11-14
Attending: SPECIALIST
Payer: MEDICARE

## 2019-11-14 DIAGNOSIS — C64.9: Primary | ICD-10-CM

## 2019-11-14 LAB
ALBUMIN SERPL BCP-MCNC: 3.9 G/DL (ref 3.5–5.2)
ALP SERPL-CCNC: 172 U/L (ref 38–126)
ALT SERPL W/O P-5'-P-CCNC: 15 U/L (ref 10–44)
ANION GAP SERPL CALC-SCNC: 9 MMOL/L (ref 8–16)
AST SERPL-CCNC: 27 U/L (ref 15–46)
BASOPHILS # BLD AUTO: 0.03 K/UL (ref 0–0.2)
BASOPHILS NFR BLD: 0.4 % (ref 0–1.9)
BILIRUB SERPL-MCNC: 0.9 MG/DL (ref 0.1–1)
BUN SERPL-MCNC: 30 MG/DL (ref 7–17)
CALCIUM SERPL-MCNC: 9.1 MG/DL (ref 8.7–10.5)
CHLORIDE SERPL-SCNC: 100 MMOL/L (ref 95–110)
CO2 SERPL-SCNC: 30 MMOL/L (ref 23–29)
CREAT SERPL-MCNC: 1.1 MG/DL (ref 0.5–1.4)
DIFFERENTIAL METHOD: ABNORMAL
EOSINOPHIL # BLD AUTO: 0.2 K/UL (ref 0–0.5)
EOSINOPHIL NFR BLD: 3.2 % (ref 0–8)
ERYTHROCYTE [DISTWIDTH] IN BLOOD BY AUTOMATED COUNT: 13.4 % (ref 11.5–14.5)
EST. GFR  (AFRICAN AMERICAN): 56.8 ML/MIN/1.73 M^2
EST. GFR  (NON AFRICAN AMERICAN): 49.2 ML/MIN/1.73 M^2
GLUCOSE SERPL-MCNC: 252 MG/DL (ref 70–110)
HCT VFR BLD AUTO: 46.7 % (ref 37–48.5)
HGB BLD-MCNC: 14.7 G/DL (ref 12–16)
INR PPP: 3 (ref 0.8–1.2)
LYMPHOCYTES # BLD AUTO: 2.3 K/UL (ref 1–4.8)
LYMPHOCYTES NFR BLD: 31.3 % (ref 18–48)
MCH RBC QN AUTO: 30.1 PG (ref 27–31)
MCHC RBC AUTO-ENTMCNC: 31.5 G/DL (ref 32–36)
MCV RBC AUTO: 96 FL (ref 82–98)
MONOCYTES # BLD AUTO: 0.5 K/UL (ref 0.3–1)
MONOCYTES NFR BLD: 6.7 % (ref 4–15)
NEUTROPHILS # BLD AUTO: 4.2 K/UL (ref 1.8–7.7)
NEUTROPHILS NFR BLD: 58.4 % (ref 38–73)
PLATELET # BLD AUTO: 177 K/UL (ref 150–350)
PMV BLD AUTO: 10.2 FL (ref 9.2–12.9)
POTASSIUM SERPL-SCNC: 4.7 MMOL/L (ref 3.5–5.1)
PROT SERPL-MCNC: 7.3 G/DL (ref 6–8.4)
PROTHROMBIN TIME: 32.2 SEC (ref 9–12.5)
RBC # BLD AUTO: 4.88 M/UL (ref 4–5.4)
SODIUM SERPL-SCNC: 139 MMOL/L (ref 136–145)
WBC # BLD AUTO: 7.2 K/UL (ref 3.9–12.7)

## 2019-11-14 PROCEDURE — 85610 PROTHROMBIN TIME: CPT | Mod: PO

## 2019-11-14 PROCEDURE — 80053 COMPREHEN METABOLIC PANEL: CPT | Mod: PO

## 2019-11-14 PROCEDURE — 85025 COMPLETE CBC W/AUTO DIFF WBC: CPT | Mod: PO

## 2019-11-14 PROCEDURE — 36415 COLL VENOUS BLD VENIPUNCTURE: CPT | Mod: PO

## 2019-11-22 ENCOUNTER — HOSPITAL ENCOUNTER (OUTPATIENT)
Facility: HOSPITAL | Age: 74
Discharge: HOME | DRG: 683 | End: 2019-11-24
Attending: EMERGENCY MEDICINE | Admitting: INTERNAL MEDICINE
Payer: MEDICARE

## 2019-11-22 DIAGNOSIS — Z86.79 HISTORY OF CONGESTIVE HEART FAILURE: ICD-10-CM

## 2019-11-22 DIAGNOSIS — R07.9 CHEST PAIN, UNSPECIFIED TYPE: Primary | ICD-10-CM

## 2019-11-22 DIAGNOSIS — T39.395A NEPHROPATHY DUE TO NONSTEROIDAL ANTI-INFLAMMATORY DRUG (NSAID): ICD-10-CM

## 2019-11-22 DIAGNOSIS — C64.1 RENAL CELL CARCINOMA OF RIGHT KIDNEY: ICD-10-CM

## 2019-11-22 DIAGNOSIS — I07.1 TRICUSPID VALVE INSUFFICIENCY, UNSPECIFIED ETIOLOGY: ICD-10-CM

## 2019-11-22 DIAGNOSIS — Z79.4 TYPE 2 DIABETES MELLITUS WITH STAGE 3 CHRONIC KIDNEY DISEASE, WITH LONG-TERM CURRENT USE OF INSULIN: ICD-10-CM

## 2019-11-22 DIAGNOSIS — N17.9 AKI (ACUTE KIDNEY INJURY): ICD-10-CM

## 2019-11-22 DIAGNOSIS — N14.19 NEPHROPATHY DUE TO NONSTEROIDAL ANTI-INFLAMMATORY DRUG (NSAID): ICD-10-CM

## 2019-11-22 DIAGNOSIS — E87.5 HYPERKALEMIA: ICD-10-CM

## 2019-11-22 DIAGNOSIS — R00.2 PALPITATIONS: ICD-10-CM

## 2019-11-22 DIAGNOSIS — I48.91 ATRIAL FIBRILLATION WITH RAPID VENTRICULAR RESPONSE: ICD-10-CM

## 2019-11-22 DIAGNOSIS — Z90.5 H/O RIGHT NEPHRECTOMY: ICD-10-CM

## 2019-11-22 DIAGNOSIS — N18.30 TYPE 2 DIABETES MELLITUS WITH STAGE 3 CHRONIC KIDNEY DISEASE, WITH LONG-TERM CURRENT USE OF INSULIN: ICD-10-CM

## 2019-11-22 DIAGNOSIS — R82.71 ASYMPTOMATIC BACTERIURIA: ICD-10-CM

## 2019-11-22 DIAGNOSIS — I34.0 MITRAL VALVE INSUFFICIENCY, UNSPECIFIED ETIOLOGY: ICD-10-CM

## 2019-11-22 DIAGNOSIS — E11.22 TYPE 2 DIABETES MELLITUS WITH STAGE 3 CHRONIC KIDNEY DISEASE, WITH LONG-TERM CURRENT USE OF INSULIN: ICD-10-CM

## 2019-11-22 DIAGNOSIS — I48.91 ATRIAL FIBRILLATION WITH RVR: ICD-10-CM

## 2019-11-22 DIAGNOSIS — D64.9 NORMOCYTIC ANEMIA: ICD-10-CM

## 2019-11-22 DIAGNOSIS — C64.1 RENAL CANCER, RIGHT: ICD-10-CM

## 2019-11-22 DIAGNOSIS — I50.22 CHRONIC HFREF (HEART FAILURE WITH REDUCED EJECTION FRACTION): ICD-10-CM

## 2019-11-22 DIAGNOSIS — N18.30 CKD (CHRONIC KIDNEY DISEASE), STAGE III: ICD-10-CM

## 2019-11-22 LAB
ALBUMIN SERPL BCP-MCNC: 3.6 G/DL (ref 3.5–5.2)
ALP SERPL-CCNC: 228 U/L (ref 38–126)
ALT SERPL W/O P-5'-P-CCNC: 24 U/L (ref 10–44)
ANION GAP SERPL CALC-SCNC: 8 MMOL/L (ref 8–16)
APTT BLDCRRT: 28 SEC (ref 21–32)
AST SERPL-CCNC: 45 U/L (ref 15–46)
BACTERIA #/AREA URNS AUTO: ABNORMAL /HPF
BASOPHILS # BLD AUTO: 0.02 K/UL (ref 0–0.2)
BASOPHILS NFR BLD: 0.2 % (ref 0–1.9)
BILIRUB SERPL-MCNC: 1.8 MG/DL (ref 0.1–1)
BILIRUB UR QL STRIP: NEGATIVE
BUN SERPL-MCNC: 37 MG/DL (ref 7–17)
CALCIUM SERPL-MCNC: 9.3 MG/DL (ref 8.7–10.5)
CHLORIDE SERPL-SCNC: 98 MMOL/L (ref 95–110)
CLARITY UR REFRACT.AUTO: CLEAR
CO2 SERPL-SCNC: 30 MMOL/L (ref 23–29)
COLOR UR AUTO: YELLOW
CREAT SERPL-MCNC: 1.87 MG/DL (ref 0.5–1.4)
DIFFERENTIAL METHOD: ABNORMAL
EOSINOPHIL # BLD AUTO: 0.3 K/UL (ref 0–0.5)
EOSINOPHIL NFR BLD: 3.8 % (ref 0–8)
ERYTHROCYTE [DISTWIDTH] IN BLOOD BY AUTOMATED COUNT: 13.5 % (ref 11.5–14.5)
EST. GFR  (AFRICAN AMERICAN): 29.9 ML/MIN/1.73 M^2
EST. GFR  (NON AFRICAN AMERICAN): 25.9 ML/MIN/1.73 M^2
GLUCOSE SERPL-MCNC: 180 MG/DL (ref 70–110)
GLUCOSE UR QL STRIP: NEGATIVE
HCT VFR BLD AUTO: 37.1 % (ref 37–48.5)
HGB BLD-MCNC: 11.7 G/DL (ref 12–16)
HGB UR QL STRIP: NEGATIVE
HYALINE CASTS UR QL AUTO: 0 /LPF
INR PPP: 1.2 (ref 0.8–1.2)
KETONES UR QL STRIP: NEGATIVE
LEUKOCYTE ESTERASE UR QL STRIP: ABNORMAL
LYMPHOCYTES # BLD AUTO: 1.4 K/UL (ref 1–4.8)
LYMPHOCYTES NFR BLD: 16.5 % (ref 18–48)
MAGNESIUM SERPL-MCNC: 2.3 MG/DL (ref 1.6–2.6)
MCH RBC QN AUTO: 30.7 PG (ref 27–31)
MCHC RBC AUTO-ENTMCNC: 31.5 G/DL (ref 32–36)
MCV RBC AUTO: 97 FL (ref 82–98)
MICROSCOPIC COMMENT: ABNORMAL
MONOCYTES # BLD AUTO: 0.7 K/UL (ref 0.3–1)
MONOCYTES NFR BLD: 7.7 % (ref 4–15)
NEUTROPHILS # BLD AUTO: 6.3 K/UL (ref 1.8–7.7)
NEUTROPHILS NFR BLD: 71.8 % (ref 38–73)
NITRITE UR QL STRIP: NEGATIVE
NT-PROBNP: 6240 PG/ML (ref 5–900)
PH UR STRIP: 7 [PH] (ref 5–8)
PHOSPHATE SERPL-MCNC: 4.2 MG/DL (ref 2.7–4.5)
PLATELET # BLD AUTO: 178 K/UL (ref 150–350)
PMV BLD AUTO: 10.6 FL (ref 9.2–12.9)
POTASSIUM SERPL-SCNC: 5.8 MMOL/L (ref 3.5–5.1)
PROT SERPL-MCNC: 7 G/DL (ref 6–8.4)
PROT UR QL STRIP: ABNORMAL
PROTHROMBIN TIME: 13.4 SEC (ref 9–12.5)
RBC # BLD AUTO: 3.81 M/UL (ref 4–5.4)
RBC #/AREA URNS AUTO: 4 /HPF (ref 0–4)
SODIUM SERPL-SCNC: 136 MMOL/L (ref 136–145)
SP GR UR STRIP: 1.01 (ref 1–1.03)
SQUAMOUS #/AREA URNS AUTO: ABNORMAL /HPF
TROPONIN I SERPL DL<=0.01 NG/ML-MCNC: <0.012 NG/ML (ref 0.01–0.03)
URN SPEC COLLECT METH UR: ABNORMAL
UROBILINOGEN UR STRIP-ACNC: NEGATIVE EU/DL
WBC # BLD AUTO: 8.73 K/UL (ref 3.9–12.7)
WBC #/AREA URNS AUTO: 6 /HPF (ref 0–5)

## 2019-11-22 PROCEDURE — 11000001 HC ACUTE MED/SURG PRIVATE ROOM

## 2019-11-22 PROCEDURE — 85610 PROTHROMBIN TIME: CPT | Mod: ER

## 2019-11-22 PROCEDURE — 25500020 PHARM REV CODE 255: Mod: ER | Performed by: EMERGENCY MEDICINE

## 2019-11-22 PROCEDURE — 84100 ASSAY OF PHOSPHORUS: CPT | Mod: ER

## 2019-11-22 PROCEDURE — 25000003 PHARM REV CODE 250: Mod: ER | Performed by: EMERGENCY MEDICINE

## 2019-11-22 PROCEDURE — 99285 EMERGENCY DEPT VISIT HI MDM: CPT | Mod: 25,ER

## 2019-11-22 PROCEDURE — 85025 COMPLETE CBC W/AUTO DIFF WBC: CPT | Mod: ER

## 2019-11-22 PROCEDURE — 80053 COMPREHEN METABOLIC PANEL: CPT | Mod: ER

## 2019-11-22 PROCEDURE — 93010 EKG 12-LEAD: ICD-10-PCS | Mod: ,,, | Performed by: STUDENT IN AN ORGANIZED HEALTH CARE EDUCATION/TRAINING PROGRAM

## 2019-11-22 PROCEDURE — 93005 ELECTROCARDIOGRAM TRACING: CPT | Mod: ER

## 2019-11-22 PROCEDURE — 84484 ASSAY OF TROPONIN QUANT: CPT | Mod: ER

## 2019-11-22 PROCEDURE — 93010 ELECTROCARDIOGRAM REPORT: CPT | Mod: ,,, | Performed by: STUDENT IN AN ORGANIZED HEALTH CARE EDUCATION/TRAINING PROGRAM

## 2019-11-22 PROCEDURE — 63600175 PHARM REV CODE 636 W HCPCS: Mod: ER | Performed by: EMERGENCY MEDICINE

## 2019-11-22 PROCEDURE — 96374 THER/PROPH/DIAG INJ IV PUSH: CPT | Mod: 59

## 2019-11-22 PROCEDURE — 81000 URINALYSIS NONAUTO W/SCOPE: CPT | Mod: ER

## 2019-11-22 PROCEDURE — 83880 ASSAY OF NATRIURETIC PEPTIDE: CPT | Mod: ER

## 2019-11-22 PROCEDURE — 83735 ASSAY OF MAGNESIUM: CPT | Mod: ER

## 2019-11-22 PROCEDURE — 85730 THROMBOPLASTIN TIME PARTIAL: CPT | Mod: ER

## 2019-11-22 RX ORDER — METOPROLOL TARTRATE 25 MG/1
25 TABLET, FILM COATED ORAL 2 TIMES DAILY
Status: ON HOLD | COMMUNITY
End: 2019-11-24 | Stop reason: HOSPADM

## 2019-11-22 RX ORDER — METOPROLOL TARTRATE 1 MG/ML
5 INJECTION, SOLUTION INTRAVENOUS EVERY 5 MIN PRN
Status: DISCONTINUED | OUTPATIENT
Start: 2019-11-22 | End: 2019-11-23

## 2019-11-22 RX ORDER — MORPHINE SULFATE 4 MG/ML
4 INJECTION, SOLUTION INTRAMUSCULAR; INTRAVENOUS
Status: ACTIVE | OUTPATIENT
Start: 2019-11-22 | End: 2019-11-23

## 2019-11-22 RX ORDER — FUROSEMIDE 20 MG/1
20 TABLET ORAL 2 TIMES DAILY
COMMUNITY
End: 2020-06-30 | Stop reason: SDUPTHER

## 2019-11-22 RX ADMIN — IOHEXOL 100 ML: 350 INJECTION, SOLUTION INTRAVENOUS at 11:11

## 2019-11-23 PROBLEM — I07.1 TRICUSPID REGURGITATION: Status: ACTIVE | Noted: 2019-11-23

## 2019-11-23 PROBLEM — I50.22 CHRONIC HFREF (HEART FAILURE WITH REDUCED EJECTION FRACTION): Status: ACTIVE | Noted: 2019-11-23

## 2019-11-23 PROBLEM — E11.9 TYPE 2 DIABETES MELLITUS, WITH LONG-TERM CURRENT USE OF INSULIN: Status: ACTIVE | Noted: 2019-11-23

## 2019-11-23 PROBLEM — Z79.4 TYPE 2 DIABETES MELLITUS, WITH LONG-TERM CURRENT USE OF INSULIN: Status: ACTIVE | Noted: 2019-11-23

## 2019-11-23 PROBLEM — Z90.5 H/O RIGHT NEPHRECTOMY: Status: ACTIVE | Noted: 2019-11-23

## 2019-11-23 PROBLEM — I48.91 ATRIAL FIBRILLATION WITH RVR: Status: ACTIVE | Noted: 2019-11-23

## 2019-11-23 PROBLEM — N14.19 NEPHROPATHY DUE TO NONSTEROIDAL ANTI-INFLAMMATORY DRUG (NSAID): Status: ACTIVE | Noted: 2019-11-23

## 2019-11-23 PROBLEM — I34.0 MITRAL REGURGITATION: Status: ACTIVE | Noted: 2019-11-23

## 2019-11-23 PROBLEM — T39.395A NEPHROPATHY DUE TO NONSTEROIDAL ANTI-INFLAMMATORY DRUG (NSAID): Status: ACTIVE | Noted: 2019-11-23

## 2019-11-23 PROBLEM — C64.9 RENAL CELL CARCINOMA: Status: ACTIVE | Noted: 2019-11-23

## 2019-11-23 PROBLEM — D64.9 NORMOCYTIC ANEMIA: Status: ACTIVE | Noted: 2019-11-23

## 2019-11-23 PROBLEM — R82.71 ASYMPTOMATIC BACTERIURIA: Status: ACTIVE | Noted: 2019-11-23

## 2019-11-23 PROBLEM — E87.5 HYPERKALEMIA: Status: ACTIVE | Noted: 2019-11-23

## 2019-11-23 PROBLEM — N17.9 AKI (ACUTE KIDNEY INJURY): Status: ACTIVE | Noted: 2019-11-23

## 2019-11-23 PROBLEM — R07.9 CHEST PAIN: Status: ACTIVE | Noted: 2019-11-23

## 2019-11-23 LAB
ANION GAP SERPL CALC-SCNC: 10 MMOL/L (ref 8–16)
ANION GAP SERPL CALC-SCNC: 9 MMOL/L (ref 8–16)
BUN SERPL-MCNC: 32 MG/DL (ref 8–23)
BUN SERPL-MCNC: 33 MG/DL (ref 8–23)
CALCIUM SERPL-MCNC: 9.2 MG/DL (ref 8.7–10.5)
CALCIUM SERPL-MCNC: 9.9 MG/DL (ref 8.7–10.5)
CHLORIDE SERPL-SCNC: 103 MMOL/L (ref 95–110)
CHLORIDE SERPL-SCNC: 103 MMOL/L (ref 95–110)
CK SERPL-CCNC: 583 U/L (ref 20–180)
CO2 SERPL-SCNC: 22 MMOL/L (ref 23–29)
CO2 SERPL-SCNC: 26 MMOL/L (ref 23–29)
CREAT SERPL-MCNC: 1.8 MG/DL (ref 0.5–1.4)
CREAT SERPL-MCNC: 1.8 MG/DL (ref 0.5–1.4)
CREAT UR-MCNC: 34.5 MG/DL (ref 15–325)
EST. GFR  (AFRICAN AMERICAN): 31 ML/MIN/1.73 M^2
EST. GFR  (AFRICAN AMERICAN): 31 ML/MIN/1.73 M^2
EST. GFR  (NON AFRICAN AMERICAN): 27 ML/MIN/1.73 M^2
EST. GFR  (NON AFRICAN AMERICAN): 27 ML/MIN/1.73 M^2
ESTIMATED AVG GLUCOSE: 180 MG/DL (ref 68–131)
FERRITIN SERPL-MCNC: 204 NG/ML (ref 20–300)
FOLATE SERPL-MCNC: 14 NG/ML (ref 4–24)
GLUCOSE SERPL-MCNC: 140 MG/DL (ref 70–110)
GLUCOSE SERPL-MCNC: 212 MG/DL (ref 70–110)
HBA1C MFR BLD HPLC: 7.9 % (ref 4–5.6)
IRON SERPL-MCNC: 59 UG/DL (ref 30–160)
POCT GLUCOSE: 132 MG/DL (ref 70–110)
POCT GLUCOSE: 158 MG/DL (ref 70–110)
POCT GLUCOSE: 166 MG/DL (ref 70–110)
POCT GLUCOSE: 168 MG/DL (ref 70–110)
POTASSIUM SERPL-SCNC: 5.2 MMOL/L (ref 3.5–5.1)
POTASSIUM SERPL-SCNC: 6.4 MMOL/L (ref 3.5–5.1)
SATURATED IRON: 19 % (ref 20–50)
SODIUM SERPL-SCNC: 135 MMOL/L (ref 136–145)
SODIUM SERPL-SCNC: 138 MMOL/L (ref 136–145)
SODIUM UR-SCNC: 92 MMOL/L (ref 20–250)
TOTAL IRON BINDING CAPACITY: 314 UG/DL (ref 250–450)
TRANSFERRIN SERPL-MCNC: 212 MG/DL (ref 200–375)
TROPONIN I SERPL DL<=0.01 NG/ML-MCNC: <0.006 NG/ML (ref 0–0.03)
VIT B12 SERPL-MCNC: 318 PG/ML (ref 210–950)

## 2019-11-23 PROCEDURE — 96375 TX/PRO/DX INJ NEW DRUG ADDON: CPT

## 2019-11-23 PROCEDURE — 25000003 PHARM REV CODE 250: Performed by: STUDENT IN AN ORGANIZED HEALTH CARE EDUCATION/TRAINING PROGRAM

## 2019-11-23 PROCEDURE — 82728 ASSAY OF FERRITIN: CPT

## 2019-11-23 PROCEDURE — 82746 ASSAY OF FOLIC ACID SERUM: CPT

## 2019-11-23 PROCEDURE — 63600175 PHARM REV CODE 636 W HCPCS: Performed by: STUDENT IN AN ORGANIZED HEALTH CARE EDUCATION/TRAINING PROGRAM

## 2019-11-23 PROCEDURE — 82570 ASSAY OF URINE CREATININE: CPT

## 2019-11-23 PROCEDURE — 84300 ASSAY OF URINE SODIUM: CPT

## 2019-11-23 PROCEDURE — 63600175 PHARM REV CODE 636 W HCPCS: Performed by: INTERNAL MEDICINE

## 2019-11-23 PROCEDURE — 94761 N-INVAS EAR/PLS OXIMETRY MLT: CPT

## 2019-11-23 PROCEDURE — 36415 COLL VENOUS BLD VENIPUNCTURE: CPT

## 2019-11-23 PROCEDURE — 11000001 HC ACUTE MED/SURG PRIVATE ROOM

## 2019-11-23 PROCEDURE — C9399 UNCLASSIFIED DRUGS OR BIOLOG: HCPCS | Performed by: INTERNAL MEDICINE

## 2019-11-23 PROCEDURE — 63600175 PHARM REV CODE 636 W HCPCS: Mod: ER | Performed by: EMERGENCY MEDICINE

## 2019-11-23 PROCEDURE — 82607 VITAMIN B-12: CPT

## 2019-11-23 PROCEDURE — 84484 ASSAY OF TROPONIN QUANT: CPT

## 2019-11-23 PROCEDURE — 87077 CULTURE AEROBIC IDENTIFY: CPT

## 2019-11-23 PROCEDURE — 80048 BASIC METABOLIC PNL TOTAL CA: CPT | Mod: 91

## 2019-11-23 PROCEDURE — 87186 SC STD MICRODIL/AGAR DIL: CPT

## 2019-11-23 PROCEDURE — 83036 HEMOGLOBIN GLYCOSYLATED A1C: CPT

## 2019-11-23 PROCEDURE — G0378 HOSPITAL OBSERVATION PER HR: HCPCS

## 2019-11-23 PROCEDURE — 25000003 PHARM REV CODE 250: Mod: ER | Performed by: EMERGENCY MEDICINE

## 2019-11-23 PROCEDURE — 82550 ASSAY OF CK (CPK): CPT

## 2019-11-23 PROCEDURE — 83540 ASSAY OF IRON: CPT

## 2019-11-23 PROCEDURE — 87086 URINE CULTURE/COLONY COUNT: CPT

## 2019-11-23 PROCEDURE — 87088 URINE BACTERIA CULTURE: CPT

## 2019-11-23 RX ORDER — SODIUM CHLORIDE 0.9 % (FLUSH) 0.9 %
10 SYRINGE (ML) INJECTION
Status: DISCONTINUED | OUTPATIENT
Start: 2019-11-23 | End: 2019-11-24 | Stop reason: HOSPADM

## 2019-11-23 RX ORDER — ONDANSETRON 2 MG/ML
4 INJECTION INTRAMUSCULAR; INTRAVENOUS
Status: COMPLETED | OUTPATIENT
Start: 2019-11-23 | End: 2019-11-23

## 2019-11-23 RX ORDER — METOPROLOL TARTRATE 50 MG/1
50 TABLET ORAL 2 TIMES DAILY
Status: DISCONTINUED | OUTPATIENT
Start: 2019-11-23 | End: 2019-11-24 | Stop reason: HOSPADM

## 2019-11-23 RX ORDER — ONDANSETRON 8 MG/1
8 TABLET, ORALLY DISINTEGRATING ORAL EVERY 6 HOURS PRN
Status: DISCONTINUED | OUTPATIENT
Start: 2019-11-23 | End: 2019-11-24 | Stop reason: HOSPADM

## 2019-11-23 RX ORDER — PRAVASTATIN SODIUM 40 MG/1
40 TABLET ORAL NIGHTLY
Status: DISCONTINUED | OUTPATIENT
Start: 2019-11-23 | End: 2019-11-24 | Stop reason: HOSPADM

## 2019-11-23 RX ORDER — ACETAMINOPHEN 325 MG/1
650 TABLET ORAL EVERY 6 HOURS PRN
Status: DISCONTINUED | OUTPATIENT
Start: 2019-11-23 | End: 2019-11-24 | Stop reason: HOSPADM

## 2019-11-23 RX ORDER — FUROSEMIDE 20 MG/1
20 TABLET ORAL DAILY
Status: DISCONTINUED | OUTPATIENT
Start: 2019-11-24 | End: 2019-11-24 | Stop reason: HOSPADM

## 2019-11-23 RX ORDER — GLUCAGON 1 MG
1 KIT INJECTION
Status: DISCONTINUED | OUTPATIENT
Start: 2019-11-23 | End: 2019-11-24 | Stop reason: HOSPADM

## 2019-11-23 RX ORDER — IBUPROFEN 200 MG
16 TABLET ORAL
Status: DISCONTINUED | OUTPATIENT
Start: 2019-11-23 | End: 2019-11-24 | Stop reason: HOSPADM

## 2019-11-23 RX ORDER — METOPROLOL TARTRATE 50 MG/1
50 TABLET ORAL
Status: COMPLETED | OUTPATIENT
Start: 2019-11-23 | End: 2019-11-23

## 2019-11-23 RX ORDER — INSULIN GLARGINE 100 [IU]/ML
25 INJECTION, SOLUTION SUBCUTANEOUS NIGHTLY
Status: DISCONTINUED | OUTPATIENT
Start: 2019-11-23 | End: 2019-11-23 | Stop reason: CLARIF

## 2019-11-23 RX ORDER — TRAMADOL HYDROCHLORIDE 50 MG/1
50 TABLET ORAL EVERY 8 HOURS PRN
Status: DISCONTINUED | OUTPATIENT
Start: 2019-11-23 | End: 2019-11-23

## 2019-11-23 RX ORDER — INSULIN ASPART 100 [IU]/ML
1-10 INJECTION, SOLUTION INTRAVENOUS; SUBCUTANEOUS
Status: DISCONTINUED | OUTPATIENT
Start: 2019-11-23 | End: 2019-11-24 | Stop reason: HOSPADM

## 2019-11-23 RX ORDER — FUROSEMIDE 20 MG/1
20 TABLET ORAL 2 TIMES DAILY
Status: DISCONTINUED | OUTPATIENT
Start: 2019-11-23 | End: 2019-11-23

## 2019-11-23 RX ORDER — PRAVASTATIN SODIUM 40 MG/1
40 TABLET ORAL DAILY
Status: DISCONTINUED | OUTPATIENT
Start: 2019-11-23 | End: 2019-11-23

## 2019-11-23 RX ORDER — PRAVASTATIN SODIUM 10 MG/1
10 TABLET ORAL DAILY
Status: DISCONTINUED | OUTPATIENT
Start: 2019-11-23 | End: 2019-11-23

## 2019-11-23 RX ORDER — IBUPROFEN 200 MG
24 TABLET ORAL
Status: DISCONTINUED | OUTPATIENT
Start: 2019-11-23 | End: 2019-11-24 | Stop reason: HOSPADM

## 2019-11-23 RX ADMIN — PRAVASTATIN SODIUM 40 MG: 40 TABLET ORAL at 08:11

## 2019-11-23 RX ADMIN — ONDANSETRON 8 MG: 8 TABLET, ORALLY DISINTEGRATING ORAL at 12:11

## 2019-11-23 RX ADMIN — ONDANSETRON 4 MG: 2 INJECTION INTRAMUSCULAR; INTRAVENOUS at 06:11

## 2019-11-23 RX ADMIN — ONDANSETRON 8 MG: 8 TABLET, ORALLY DISINTEGRATING ORAL at 08:11

## 2019-11-23 RX ADMIN — DEXTROSE MONOHYDRATE 12.5 G: 500 INJECTION PARENTERAL at 09:11

## 2019-11-23 RX ADMIN — SODIUM POLYSTYRENE SULFONATE 30 G: 15 SUSPENSION ORAL; RECTAL at 01:11

## 2019-11-23 RX ADMIN — INSULIN HUMAN 10 UNITS: 100 INJECTION, SOLUTION PARENTERAL at 09:11

## 2019-11-23 RX ADMIN — FUROSEMIDE 20 MG: 20 TABLET ORAL at 09:11

## 2019-11-23 RX ADMIN — INSULIN DETEMIR 25 UNITS: 100 INJECTION, SOLUTION SUBCUTANEOUS at 08:11

## 2019-11-23 RX ADMIN — METOPROLOL TARTRATE 50 MG: 50 TABLET ORAL at 09:11

## 2019-11-23 RX ADMIN — CALCIUM GLUCONATE 1000 MG: 94 INJECTION, SOLUTION INTRAVENOUS at 09:11

## 2019-11-23 RX ADMIN — METOPROLOL TARTRATE 50 MG: 50 TABLET ORAL at 08:11

## 2019-11-23 RX ADMIN — INSULIN ASPART 1 UNITS: 100 INJECTION, SOLUTION INTRAVENOUS; SUBCUTANEOUS at 08:11

## 2019-11-23 RX ADMIN — METOPROLOL TARTRATE 50 MG: 50 TABLET ORAL at 01:11

## 2019-11-23 RX ADMIN — ACETAMINOPHEN 650 MG: 325 TABLET ORAL at 08:11

## 2019-11-23 RX ADMIN — SODIUM POLYSTYRENE SULFONATE 30 G: 15 SUSPENSION ORAL; RECTAL at 09:11

## 2019-11-23 NOTE — ED NOTES
"Pt requesting "juice and something to eat because I have not eaten in hours" and "I need something for pain. I know I refused the morphine earlier, but I need something."  Pt reports pain in left side and states "I need relief.   MD notified.  He will go speak with pt.  "

## 2019-11-23 NOTE — ED NOTES
Notified MD that pt had a short episode of mid-sternal chest pain and that has currently resolved.

## 2019-11-23 NOTE — PLAN OF CARE
VN cued into room.  Pt resting comfortably in bed with call light and personal belongings within reach.  NADN.  No family at bedside.  Staff in room.  Pt able to answer all admission questions accurately.  No needs or complaints at this time.  VN will continue to follow and be available as needed.

## 2019-11-23 NOTE — ED NOTES
Notified MD that we are canceling admit to [facility] at this time.   Pt insisting on going to [facility].  Awaiting new orders

## 2019-11-23 NOTE — ED PROVIDER NOTES
Encounter Date: 11/22/2019       History     Chief Complaint   Patient presents with    Abdominal Pain     pt had right kidney removed on Monday due to cancerous tumor at [facility]. states has not had bowel movement since surgery. having left sided flank pain as well. denies any fever. nausea no vomiting.      This patient is a 74-year-old female.  She presents to emergency department complaining of chest pain and abdominal pain. She is postop day 4 from a right nephrectomy for renal cancer, performed at [facility].  She said that she was discharged on postop day 1, Tuesday, and said that she was not given any medication for pain when she was discharged.  She has had persistent pain in her abdomen, her back, nonradiating.  No associated nausea or vomiting, but she has has been constipated, said that she has not had a bowel movement since surgery.  She did not contact her surgeon about the problem.  She said that she does not want to go back [facility], she was not happy with the care that she received there.  She said that her urologist in [place] referred her to a urologist at [facility] because of the malignancy.  She said she was told that it was stage II.    She has a history of coronary artery disease, CHF, and chronic atrial fibrillation.  She said that warfarin was held for the surgery, and she has not started taking it again.  Tonight she started having some chest pain, left-sided, describes it as a pressure sensation, associated with shortness of breath. No cough sputum production or hemoptysis.  No known DVT or PE.        I reviewed her previous cardiac records.  In May 2017 she had an echocardiogram that showed an ejection fraction of 10-15%.  In July 2017 she had a left heart catheterization, coronary arteries were clear, ejection fraction was estimated at 25%.  There are no more recent studies in our computer system.        Review of patient's  allergies indicates:   Allergen Reactions    Sulfa (sulfonamide antibiotics)      Past Medical History:   Diagnosis Date    Atrial fibrillation     Diabetes mellitus     Dyslipidemia     Osteoporosis, unspecified     Tobacco use     Urinary tract infection     Vaginal infection      Past Surgical History:   Procedure Laterality Date     SECTION          HYSTERECTOMY       - partial    INCONTINENCE SURGERY      KNEE SURGERY       Family History   Problem Relation Age of Onset    Kidney disease Maternal Grandmother     Breast cancer Sister     Breast cancer Cousin      Social History     Tobacco Use    Smoking status: Former Smoker     Last attempt to quit: 2017     Years since quittin.5    Smokeless tobacco: Never Used   Substance Use Topics    Alcohol use: No    Drug use: No     Review of Systems   Constitutional: Negative for chills and fever.   HENT: Negative for congestion and rhinorrhea.    Respiratory: Positive for shortness of breath. Negative for cough.    Cardiovascular: Positive for chest pain. Negative for palpitations and leg swelling.        See HPI, history of AFib   Gastrointestinal: Positive for abdominal pain and constipation. Negative for nausea and vomiting.   Endocrine:        Type 2 diabetic   Genitourinary: Negative for dysuria and hematuria.   Musculoskeletal: Positive for back pain (Chronic back pain, preceded the surgical procedure).   Skin: Negative for rash.   Allergic/Immunologic: Positive for immunocompromised state (Diabetic).   Neurological: Negative for seizures and syncope.   Hematological: Bruises/bleeds easily (Currently off warfarin, is taking aspirin daily).   Psychiatric/Behavioral: Negative.        Physical Exam     Initial Vitals [198]   BP Pulse Resp Temp SpO2   122/79 (!) 116 18 97.7 °F (36.5 °C) 95 %      MAP       --         Physical Exam    Nursing note and vitals reviewed.  Constitutional: She appears well-developed  and well-nourished. She is not diaphoretic. No distress.   Obese adult female, appears uncomfortable   HENT:   Head: Normocephalic and atraumatic.   Right Ear: External ear normal.   Left Ear: External ear normal.   Mouth/Throat: Oropharynx is clear and moist.   Eyes: Conjunctivae are normal. Pupils are equal, round, and reactive to light. No scleral icterus.   Neck: No JVD present.   Cardiovascular: Normal heart sounds and intact distal pulses.   No murmur heard.  Regularly irregular, tachycardic, AFib on the monitor   Pulmonary/Chest: No stridor. No respiratory distress. She has no wheezes.   Posterior bibasilar crackles   Abdominal: Soft. She exhibits no distension. There is tenderness.   She has a right lower quadrant incision, and several laparoscopy incisions.  She has ecchymosis of the abdominal wall and right flank   Musculoskeletal: She exhibits tenderness (Tender in the left calf). She exhibits no edema.   Neurological: She is alert. She has normal strength.   Skin: Skin is warm and dry.   Psychiatric:    anxious         ED Course   Procedures  Labs Reviewed   URINALYSIS, REFLEX TO URINE CULTURE - Abnormal; Notable for the following components:       Result Value    Protein, UA 1+ (*)     Leukocytes, UA 1+ (*)     All other components within normal limits    Narrative:     Preferred Collection Type->Urine, Clean Catch   COMPREHENSIVE METABOLIC PANEL - Abnormal; Notable for the following components:    Potassium 5.8 (*)     CO2 30 (*)     Glucose 180 (*)     BUN, Bld 37 (*)     Creatinine 1.87 (*)     Total Bilirubin 1.8 (*)     Alkaline Phosphatase 228 (*)     eGFR if  29.9 (*)     eGFR if non  25.9 (*)     All other components within normal limits   CBC W/ AUTO DIFFERENTIAL - Abnormal; Notable for the following components:    RBC 3.81 (*)     Hemoglobin 11.7 (*)     Mean Corpuscular Hemoglobin Conc 31.5 (*)     Lymph% 16.5 (*)     All other components within normal limits    PROTIME-INR - Abnormal; Notable for the following components:    Prothrombin Time 13.4 (*)     All other components within normal limits   NT-PRO NATRIURETIC PEPTIDE - Abnormal; Notable for the following components:    NT-proBNP 6240 (*)     All other components within normal limits   URINALYSIS MICROSCOPIC - Abnormal; Notable for the following components:    WBC, UA 6 (*)     All other components within normal limits    Narrative:     Preferred Collection Type->Urine, Clean Catch   CULTURE, URINE   APTT   MAGNESIUM   PHOSPHORUS   TROPONIN I     EKG Readings: (Independently Interpreted)   I independently reviewed the EKG tracing.  It shows atrial fibrillation, rapid ventricular response rate of 110.  There is T-wave flattening in the limb leads, and precordial leads V3 through V6.  There is T-wave inversion in V4 and V5.       Imaging Results          CT Abdomen Pelvis  Without Contrast (Final result)  Result time 11/22/19 23:35:03    Final result by Andrews Riojas MD (Timothy) (11/22/19 23:35:03)                 Impression:      Interval right nephrectomy.  Small amount of air is seen in the low right retroperitoneum.  There is also air noted in the right anterior abdominal wall related to surgery.  No abnormal fluid collections.    Stable gallstones.    All CT scans at this facility use dose modulation, iterative reconstructions, and/or weight base dosing when appropriate to reduce radiation dose to as low as reasonably achievable      Electronically signed by: Andrews Riojas MD  Date:    11/22/2019  Time:    23:35             Narrative:    EXAMINATION:  CT ABDOMEN PELVIS WITHOUT CONTRAST    CLINICAL HISTORY:  Abdominal pain, unspecified;Postop day for right nephrectomy for renal cancer;    COMPARISON:  CT scan abdomen pelvis 09/24/2019    FINDINGS:  The liver, the spleen, and the pancreas appear normal.    Calcified gallstones.  No gallbladder wall thickening or bile duct dilatation.    There is interval  right nephrectomy.  Small amount of air is seen adjacent to the superior aspect of the right psoas muscle likely related to recent surgery.  There is also air noted involving the anterior abdominal wall predominantly on the right likely related to surgery as well.  No focal fluid collections seen.  The left kidney appears unremarkable.    The aorta and inferior vena cava are unremarkable.    There are no acute bowel abnormalities.     No evidence of appendicitis.  No evidence of diverticulitis.    Bladder is normal. No abnormal masses or fluid collections in the pelvis.    Skeletal structures are intact.  No acute skeletal findings.                               CTA Chest Non-Coronary (Final result)  Result time 11/22/19 23:27:14    Final result by Andrews Riojas MD (Timothy) (11/22/19 23:27:14)                 Impression:      Negative CTA of the chest. No evidence of pulmonary embolism.  No definite infiltrates or consolidation.  Dependent lung changes at the bases.    All CT scans at this facility use dose modulation, iterative reconstruction and/or weight based dosing when appropriate to reduce radiation dose to as low as reasonably achievable.      Electronically signed by: Andrews Riojas MD  Date:    11/22/2019  Time:    23:27             Narrative:    EXAMINATION:  CTA CHEST NON CORONARY    CLINICAL HISTORY:  Chest pain, acute, PE suspected, high pretest prob;    TECHNIQUE:  Standard CTA of the chest performed with 100 cc Omnipaque 350 utilizing 3-D MIP reformations.    COMPARISON:  Comparison 10/04/2019    FINDINGS:  Cardiomegaly.  Major pulmonary arteries are normal.  Thoracic aorta demonstrates mild atherosclerosis no evidence of dissection or aneurysm.    Mild hazy density posteriorly at the lung bases probably representing dependent lung changes.  No pneumonia suspected.    Upper abdominal images are negative.                                 Medical Decision Making:   Initial Assessment:   The patient  "was given morphine for pain. Troponin is not elevated, proBNP is 6200.  A CT scan of the chest PE protocol was obtained, shows no evidence of pulmonary emboli, no evidence of dissection, she has cardiomegaly, does not appear to be in pulmonary edema.    I discussed the options with the patient, and she said that she absolutely does not want to go back to [facility].  She initially said that she wanted to see her primary care provider and cardiologist at [facility], but apparently they do not have cardiology services over the weekend.    Therefore she is being placed in observation status at [facility], on the Hospital Medicine service, for serial enzymes, telemetry monitoring, and possibly evaluation by Cardiology at the hospitalist's discretion.    She has atrial fibrillation, she was given IV Lopressor, followed by oral Lopressor, her heart rate is now in the 110 range      0508 - previously the patient said that she did not want to go back to [facility], and said that she wanted instead to be admitted at [place].  She was accepted at [place], by the hospitalist service, the patient was informed and agreed.  When the ambulance arrived, the patient said that she changed her mind, does not want to go to [place] because they do not have heaters to keep in the room, and now she says that she will only go back to [facility], that she does not want to go to any other hospital.  She says that she never made any statements that she did not want to be admitted to [facility], although she refused earlier to go there, in the presence of several of the nurses.  Some of this change in her mind/decision appears to be manipulative, because the patient is demanding pain medication.  She says that she does not like to take morphine because it is "too strong", and that she "only takes Demerol."        Addendum:  The patient is changed her mind again, and now says that she does " not want to go to [facility].                                   Clinical Impression:       ICD-10-CM ICD-9-CM   1. Chest pain, unspecified type R07.9 786.50   2. Palpitations R00.2 785.1   3. History of congestive heart failure Z86.79 V12.59   4. Atrial fibrillation with rapid ventricular response I48.91 427.31   5. Renal cancer, right C64.1 189.0         Disposition:   Disposition: Placed in Observation  Condition: Stable                     Barry Mccullough MD  11/23/19 0230       Barry Mccullough MD  11/23/19 0512       Barry Mccullough MD  11/23/19 0513

## 2019-11-23 NOTE — ED NOTES
After multiple conversations with MD and Charge RN, pt decided she will be admitted to [place].  Pt provided cereal to eat at this time.

## 2019-11-23 NOTE — ED NOTES
Assisted pt to [facility].  Call bell in reach, instructed to call when complete. Pt verbalized understanding.

## 2019-11-23 NOTE — H&P
[facility] Internal Medicine H&P    Admitting Team: Team A  Attending Physician: Janey  Resident: Eduardo  Intern: Keith    Date of Admit: 2019    Chief Complaint     L groin pain and back pain x 2 days    Subjective:      History of Present Illness:  Yajaira Terry is a 74 y.o.F with Afib (on coumadin), RCC s/p R nephrectomy (Monday of this week), chronic systolic HF (EF 10%), DMII and HTN who presents from [facility] for what was allegedly chest pain though she reports she is having pain in her L groin and back.    The patient was in their usual state of health--ambulates around house with ease but has some chronic SOB--until having surgery on Monday. Since surgery she has done well and has been without singificant pain, fevers, chills. For the past two days she's been having pain in her left groin (surgery on R) and some back pain on the left as well that radiates down her leg. She has not had fevers, chills, dysuria or hematuria.  She reports stability of her chronic SOB and has had no significant chest pain, palpitations or lightheadedness.     Past Medical History:  Past Medical History:   Diagnosis Date    Atrial fibrillation     CHF (congestive heart failure)     Coronary artery disease     Diabetes mellitus     Dyslipidemia     Osteoporosis, unspecified     Tobacco use     Urinary tract infection     Vaginal infection        Past Surgical History:  Past Surgical History:   Procedure Laterality Date     SECTION          HYSTERECTOMY       - partial    INCONTINENCE SURGERY      KNEE SURGERY         Allergies:  Review of patient's allergies indicates:   Allergen Reactions    Sulfa (sulfonamide antibiotics)        Home Medications:  Prior to Admission medications    Medication Sig Start Date End Date Taking? Authorizing Provider   CALCIUM & MAGNESIUM CARBONATES ORAL Take by mouth.   Yes Historical Provider, MD   furosemide (LASIX) 20 MG tablet Take 20 mg by mouth 2 (two) times daily.    Yes Historical Provider, MD   INSULIN ASPART (NOVOLOG SUBQ) Inject into the skin.   Yes Historical Provider, MD   INSULIN GLARGINE,HUM.REC.ANLOG (LANTUS SUBQ) Inject 40 Units into the skin once daily at 6am. Takes med at 6pm.   Yes Historical Provider, MD   metoprolol tartrate (LOPRESSOR) 25 MG tablet Take 25 mg by mouth 2 (two) times daily.   Yes Historical Provider, MD   pravastatin (PRAVACHOL) 10 MG tablet Take 10 mg by mouth once daily.   Yes Historical Provider, MD   warfarin (COUMADIN) 3 MG tablet Take 3 mg by mouth Daily. Alternate 1 to 1/2 tab qd   Yes Historical Provider, MD   diclofenac sodium (VOLTAREN) 1 % Gel Apply 2 g topically every 6 (six) hours as needed. For pain. 13   Kedar Vasquez DPM   phenazopyridine (PYRIDIUM) 200 MG tablet Take 1 tablet (200 mg total) by mouth 3 (three) times daily. 19   Louie Alvarez MD   propafenone (RHTHYMOL) 150 MG Tab Take 150 mg by mouth 3 (three) times daily.    Historical Provider, MD   tramadol 50 mg TbDL Take 50 mg by mouth as needed.    Historical Provider, MD       Family History:  Family History   Problem Relation Age of Onset    Kidney disease Maternal Grandmother     Breast cancer Sister     Breast cancer Cousin        Social History:  Social History     Tobacco Use    Smoking status: Former Smoker     Last attempt to quit: 2017     Years since quittin.5    Smokeless tobacco: Never Used   Substance Use Topics    Alcohol use: No    Drug use: No       Review of Systems:  Pertinent items are noted in HPI. All other systems are reviewed and are negative.    Health Maintaince :   Primary Care Physician: Kamlesh    Immunizations:   TDap UTD  Flu not UTD Pna not UTD    Cancer Screening:  PAP: not UTD  MMG UTD  Colonoscopy not UTD     Objective:   Last 24 Hour Vital Signs:  BP  Min: 108/89  Max: 132/73  Temp  Av.7 °F (36.5 °C)  Min: 97.6 °F (36.4 °C)  Max: 97.9 °F (36.6 °C)  Pulse  Av.2  Min: 108  Max: 129  Resp   "Av.9  Min: 14  Max: 24  SpO2  Av.8 %  Min: 93 %  Max: 96 %  Height  Av' 1" (154.9 cm)  Min: 5' 1" (154.9 cm)  Max: 5' 1" (154.9 cm)  Weight  Av.6 kg (182 lb)  Min: 82.6 kg (182 lb)  Max: 82.6 kg (182 lb)  Body mass index is 34.39 kg/m².  No intake/output data recorded.    Physical Examination:  Gen: awake, alert, NAD  HEENT: PERRL, EOMi, oropharynx clear, moist and without exudate, JVP 7 cm, no cervical lymphadenopathy   CVS: regular rate, irregular rhythm, normal S1/S2, without S3/S4 with 2/6 holosystolic murmur  Resp: no use of accessory muscles, no wheezes, no rhonchi, no rales  Abdo: soft, non-tender, non-distended, bowel sounds +, without guarding or rebound  Neuro: moves all extremities, no abnormal tone  Ext: no edema, no cyanosis  Skin: no rashes or skin breakdown noted    Laboratory:  Most Recent Data:  CBC:   Lab Results   Component Value Date    WBC 8.73 2019    HGB 11.7 (L) 2019    HCT 37.1 2019     2019    MCV 97 2019    RDW 13.5 2019     WBC Differential: 71.8 % N, 0 % Bands, 16.5 % L,7.7 % M, 3.8 % Eo, 0.2 % Baso, no additional cells seen  BMP:   Lab Results   Component Value Date     2019    K 5.8 (H) 2019    CL 98 2019    CO2 30 (H) 2019    BUN 37 (H) 2019    CREATININE 1.87 (H) 2019     (H) 2019    CALCIUM 9.3 2019    MG 2.3 2019    PHOS 4.2 2019     LFTs:   Lab Results   Component Value Date    PROT 7.0 2019    ALBUMIN 3.6 2019    BILITOT 1.8 (H) 2019    AST 45 2019    ALKPHOS 228 (H) 2019    ALT 24 2019     Coags:   Lab Results   Component Value Date    INR 1.2 2019     FLP:   Lab Results   Component Value Date    CHOL 141 2019    HDL 39 (L) 2019    LDLCALC 75.0 2019    TRIG 135 2019    CHOLHDL 27.7 2019     DM:   Lab Results   Component Value Date    HGBA1C 8.3 (H) 2018    HGBA1C 7.5 " (H) 01/22/2016    HGBA1C 8.3 (H) 05/13/2015    LDLCALC 75.0 03/13/2019    CREATININE 1.87 (H) 11/22/2019     Thyroid:   Lab Results   Component Value Date    TSH 1.060 01/22/2016    X3ZAAZZ 6.9 10/29/2013    E8LPBQE 92 10/29/2013     Anemia: No results found for: IRON, TIBC, FERRITIN, [id number], FOLATE  Cardiac:   Lab Results   Component Value Date    TROPONINI <0.012 11/22/2019    BNP 91 09/23/2010     Urinalysis:   Lab Results   Component Value Date    LABURIN No significant growth 07/25/2019    COLORU Yellow 11/22/2019    SPECGRAV 1.010 11/22/2019    NITRITE Negative 11/22/2019    KETONESU Negative 11/22/2019    UROBILINOGEN Negative 11/22/2019    WBCUA 6 (H) 11/22/2019       Trended Lab Data:  Recent Labs   Lab 11/22/19 2222   WBC 8.73   HGB 11.7*   HCT 37.1      MCV 97   RDW 13.5      K 5.8*   CL 98   CO2 30*   BUN 37*   CREATININE 1.87*   *   PROT 7.0   ALBUMIN 3.6   BILITOT 1.8*   AST 45   ALKPHOS 228*   ALT 24       Trended Cardiac Data:  Recent Labs   Lab 11/22/19 2222   TROPONINI <0.012       Microbiology Data:  None    Other Results:  EKG (my interpretation): A fib with RVR, ST depression inferiorly and laterally  Radiology:  Imaging Results          CT Abdomen Pelvis  Without Contrast (Final result)  Result time 11/22/19 23:35:03    Final result by Andrews Riojas MD (Timothy) (11/22/19 23:35:03)                 Impression:      Interval right nephrectomy.  Small amount of air is seen in the low right retroperitoneum.  There is also air noted in the right anterior abdominal wall related to surgery.  No abnormal fluid collections.    Stable gallstones.    All CT scans at this facility use dose modulation, iterative reconstructions, and/or weight base dosing when appropriate to reduce radiation dose to as low as reasonably achievable      Electronically signed by: Andrews Riojas MD  Date:    11/22/2019  Time:    23:35             Narrative:    EXAMINATION:  CT ABDOMEN PELVIS WITHOUT  CONTRAST    CLINICAL HISTORY:  Abdominal pain, unspecified;Postop day for right nephrectomy for renal cancer;    COMPARISON:  CT scan abdomen pelvis 09/24/2019    FINDINGS:  The liver, the spleen, and the pancreas appear normal.    Calcified gallstones.  No gallbladder wall thickening or bile duct dilatation.    There is interval right nephrectomy.  Small amount of air is seen adjacent to the superior aspect of the right psoas muscle likely related to recent surgery.  There is also air noted involving the anterior abdominal wall predominantly on the right likely related to surgery as well.  No focal fluid collections seen.  The left kidney appears unremarkable.    The aorta and inferior vena cava are unremarkable.    There are no acute bowel abnormalities.     No evidence of appendicitis.  No evidence of diverticulitis.    Bladder is normal. No abnormal masses or fluid collections in the pelvis.    Skeletal structures are intact.  No acute skeletal findings.                               CTA Chest Non-Coronary (Final result)  Result time 11/22/19 23:27:14    Final result by Andrews Riojas MD (Timothy) (11/22/19 23:27:14)                 Impression:      Negative CTA of the chest. No evidence of pulmonary embolism.  No definite infiltrates or consolidation.  Dependent lung changes at the bases.    All CT scans at this facility use dose modulation, iterative reconstruction and/or weight based dosing when appropriate to reduce radiation dose to as low as reasonably achievable.      Electronically signed by: Andrews Riojas MD  Date:    11/22/2019  Time:    23:27             Narrative:    EXAMINATION:  CTA CHEST NON CORONARY    CLINICAL HISTORY:  Chest pain, acute, PE suspected, high pretest prob;    TECHNIQUE:  Standard CTA of the chest performed with 100 cc Omnipaque 350 utilizing 3-D MIP reformations.    COMPARISON:  Comparison 10/04/2019    FINDINGS:  Cardiomegaly.  Major pulmonary arteries are normal.  Thoracic  aorta demonstrates mild atherosclerosis no evidence of dissection or aneurysm.    Mild hazy density posteriorly at the lung bases probably representing dependent lung changes.  No pneumonia suspected.    Upper abdominal images are negative.                                        Assessment/Plan:     Afib with RVR  - 74F with Afib (on coumadin), RCC s/p R nephrectomy (Monday of this week), chronic systolic HF (EF 10%), DMII and HTN who presents from [facility] for what was allegedly chest pain though she reports she is having pain in her L groin and back  - initial EKG with A fib and mildly increased HR, inferolateral ST depression (seen prior) and normal troponin negative  - serial troponin ordered by ED staff but do not see a need to continue checking  - given 50 lopressor in ED, will increase her home dose to 50 bid today  - coumadin is being held perioperatively, will need to follow up with surgery in order to determine when is okay to resart    EDGAR with hyperkalemia  - Cr elevated from baseline and K 5.8; rechecking labs now, no cocnerning EKG changes  - will give calcium and shift with insulin/D50 and give kayexalate  - urine studies pending    RCC s/p R nephrectomy  - removed Monday  - incisions look okay, continue outpatient management with hem-onc    Chronic HFrEF  - last EF 10 % in 2017, appears euvolemic  - needs optimization of GDMT  - checking TTE for recovery    DMII  - last A1c 8.3 in 2018, will recheck  - allegedly on 55 units long acting and 15 with meals but hesitant to trust she takes that much regularly  - will start with sliding scale and 25 units long acting    Moderate to severe TR  - appears evuolemic, checking TTE    MR, mild  -- appears euvolemic  - checking TTE    Normocytic anemia  - work up in progress, no bleeding    Asymptomatic bacteruria  - 6 WBCs and occasional bacteria but without symptoms; will defer treating for now but will re-consider considering recent surgery if she fevers or  develops a leukocytosis     HCM  - needs vaccines    Dispo: rate control, likely discharge today  Ppx: held perioperatively  Diet: diabetic    Code Status:     Full    Anders Clark MD  [facility] Internal Medicine HO3    [facility] Medicine Hospitalist Pager numbers:   [facility] Hospitalist Medicine Team A (Duncan/Jenifer): [phone]  [facility] Hospitalist Medicine Team B (Keven/Odalys):  [phone]

## 2019-11-24 VITALS
TEMPERATURE: 98 F | BODY MASS INDEX: 35.38 KG/M2 | HEIGHT: 61 IN | HEART RATE: 80 BPM | DIASTOLIC BLOOD PRESSURE: 70 MMHG | RESPIRATION RATE: 19 BRPM | WEIGHT: 187.38 LBS | OXYGEN SATURATION: 95 % | SYSTOLIC BLOOD PRESSURE: 125 MMHG

## 2019-11-24 LAB
ANION GAP SERPL CALC-SCNC: 9 MMOL/L (ref 8–16)
BASOPHILS # BLD AUTO: 0.02 K/UL (ref 0–0.2)
BASOPHILS NFR BLD: 0.3 % (ref 0–1.9)
BUN SERPL-MCNC: 30 MG/DL (ref 8–23)
CALCIUM SERPL-MCNC: 9 MG/DL (ref 8.7–10.5)
CHLORIDE SERPL-SCNC: 103 MMOL/L (ref 95–110)
CO2 SERPL-SCNC: 30 MMOL/L (ref 23–29)
CREAT SERPL-MCNC: 1.8 MG/DL (ref 0.5–1.4)
DIFFERENTIAL METHOD: ABNORMAL
EOSINOPHIL # BLD AUTO: 0.3 K/UL (ref 0–0.5)
EOSINOPHIL NFR BLD: 4.7 % (ref 0–8)
ERYTHROCYTE [DISTWIDTH] IN BLOOD BY AUTOMATED COUNT: 13.7 % (ref 11.5–14.5)
EST. GFR  (AFRICAN AMERICAN): 31 ML/MIN/1.73 M^2
EST. GFR  (NON AFRICAN AMERICAN): 27 ML/MIN/1.73 M^2
GLUCOSE SERPL-MCNC: 76 MG/DL (ref 70–110)
HCT VFR BLD AUTO: 34.6 % (ref 37–48.5)
HGB BLD-MCNC: 10.8 G/DL (ref 12–16)
LYMPHOCYTES # BLD AUTO: 1.4 K/UL (ref 1–4.8)
LYMPHOCYTES NFR BLD: 21.5 % (ref 18–48)
MCH RBC QN AUTO: 30.2 PG (ref 27–31)
MCHC RBC AUTO-ENTMCNC: 31.2 G/DL (ref 32–36)
MCV RBC AUTO: 97 FL (ref 82–98)
MONOCYTES # BLD AUTO: 0.5 K/UL (ref 0.3–1)
MONOCYTES NFR BLD: 7.4 % (ref 4–15)
NEUTROPHILS # BLD AUTO: 4.4 K/UL (ref 1.8–7.7)
NEUTROPHILS NFR BLD: 66.1 % (ref 38–73)
PLATELET # BLD AUTO: 190 K/UL (ref 150–350)
PMV BLD AUTO: 9.8 FL (ref 9.2–12.9)
POCT GLUCOSE: 79 MG/DL (ref 70–110)
POTASSIUM SERPL-SCNC: 4.2 MMOL/L (ref 3.5–5.1)
RBC # BLD AUTO: 3.58 M/UL (ref 4–5.4)
SODIUM SERPL-SCNC: 142 MMOL/L (ref 136–145)
WBC # BLD AUTO: 6.64 K/UL (ref 3.9–12.7)

## 2019-11-24 PROCEDURE — 36415 COLL VENOUS BLD VENIPUNCTURE: CPT

## 2019-11-24 PROCEDURE — 25000003 PHARM REV CODE 250: Performed by: STUDENT IN AN ORGANIZED HEALTH CARE EDUCATION/TRAINING PROGRAM

## 2019-11-24 PROCEDURE — 80048 BASIC METABOLIC PNL TOTAL CA: CPT

## 2019-11-24 PROCEDURE — 85025 COMPLETE CBC W/AUTO DIFF WBC: CPT

## 2019-11-24 PROCEDURE — G0378 HOSPITAL OBSERVATION PER HR: HCPCS

## 2019-11-24 PROCEDURE — 94761 N-INVAS EAR/PLS OXIMETRY MLT: CPT

## 2019-11-24 RX ORDER — METOPROLOL TARTRATE 50 MG/1
100 TABLET ORAL 2 TIMES DAILY
Qty: 360 TABLET | Refills: 3 | Status: SHIPPED | OUTPATIENT
Start: 2019-11-24 | End: 2020-02-27 | Stop reason: SDUPTHER

## 2019-11-24 RX ORDER — PRAVASTATIN SODIUM 40 MG/1
40 TABLET ORAL NIGHTLY
Qty: 90 TABLET | Refills: 3 | Status: SHIPPED | OUTPATIENT
Start: 2019-11-24 | End: 2021-03-04

## 2019-11-24 RX ORDER — ACETAMINOPHEN 325 MG/1
650 TABLET ORAL ONCE
Status: COMPLETED | OUTPATIENT
Start: 2019-11-24 | End: 2019-11-24

## 2019-11-24 RX ORDER — WARFARIN 3 MG/1
3 TABLET ORAL DAILY
Start: 2019-11-24 | End: 2020-06-12

## 2019-11-24 RX ORDER — METOPROLOL TARTRATE 50 MG/1
50 TABLET ORAL ONCE
Status: DISCONTINUED | OUTPATIENT
Start: 2019-11-24 | End: 2019-11-24 | Stop reason: HOSPADM

## 2019-11-24 RX ADMIN — ONDANSETRON 8 MG: 8 TABLET, ORALLY DISINTEGRATING ORAL at 07:11

## 2019-11-24 RX ADMIN — FUROSEMIDE 20 MG: 20 TABLET ORAL at 08:11

## 2019-11-24 RX ADMIN — ACETAMINOPHEN 650 MG: 325 TABLET ORAL at 11:11

## 2019-11-24 RX ADMIN — METOPROLOL TARTRATE 50 MG: 50 TABLET ORAL at 08:11

## 2019-11-24 NOTE — NURSING
Patient safety maintained. Medications administered per order. Assistance provided as needed. Printed documentation provided for discharge. Education completed. Telemetry and IV discontinued, patient tolerated well.

## 2019-11-24 NOTE — PLAN OF CARE
Discharge orders noted, no HH or HME ordered.    Pt's nurse will go over medications/signs and symptoms prior to discharge       11/24/19 1207   Final Note   Assessment Type Final Discharge Note   Anticipated Discharge Disposition Home   What phone number can be called within the next 1-3 days to see how you are doing after discharge? [phone]   Hospital Follow Up  Appt(s) scheduled? No  (Offices closed for Weekend. Patient to schedule own follow up appointment. )   Right Care Referral Info   Post Acute Recommendation No Care     Suma Marx RN Transitional Navigator  [phone]

## 2019-11-24 NOTE — PLAN OF CARE
VN cued into room.  Pt not currently in room.  VN will continue to follow and be available as needed.

## 2019-11-24 NOTE — PROGRESS NOTES
[facility] IM Resident CHRISTIANOI Progress Note    Subjective:      No complaints this morning. Denies CP, palpitations, SOB. Wants to go home today.      Objective:   Last 24 Hour Vital Signs:  BP  Min: 104/61  Max: 122/64  Temp  Av.6 °F (36.4 °C)  Min: 96.9 °F (36.1 °C)  Max: 97.9 °F (36.6 °C)  Pulse  Av.4  Min: 80  Max: 122  Resp  Av.8  Min: 16  Max: 20  SpO2  Av.4 %  Min: 93 %  Max: 100 %  Weight  Av kg (187 lb 6.3 oz)  Min: 85 kg (187 lb 6.3 oz)  Max: 85 kg (187 lb 6.3 oz)  I/O last 3 completed shifts:  In: 1375 [P.O.:1375]  Out: 954 [Urine:950; Stool:4]    Physical Examination:  Physical Exam   Constitutional: She is oriented to person, place, and time. She appears well-developed and well-nourished. No distress.   HENT:   Head: Normocephalic and atraumatic.   Eyes: Pupils are equal, round, and reactive to light. EOM are normal.   Neck: Normal range of motion. Neck supple.   Cardiovascular: Normal rate. An irregular rhythm present.   Pulmonary/Chest: Effort normal and breath sounds normal.   Abdominal: Soft. There is no tenderness.   Musculoskeletal: Normal range of motion. She exhibits no tenderness.   Neurological: She is alert and oriented to person, place, and time. No cranial nerve deficit.   Skin: Skin is warm and dry.   Psychiatric: She has a normal mood and affect. Her behavior is normal.     Laboratory:  Laboratory Data Reviewed: yes  Pertinent Findings:  BUN/Cr 30/1.8    Microbiology Data Reviewed: yes  Pertinent Findings:  Urine cx: pending     Other Results:  EKG (my interpretation): A-fib with RVR    Radiology Data Reviewed: yes  Pertinent Findings:  CTA: Negative CTA of the chest. No evidence of pulmonary embolism.  No definite infiltrates or consolidation.  Dependent lung changes at the bases    CT AP: Interval right nephrectomy.  Small amount of air is seen in the low right retroperitoneum.  There is also air noted in the right anterior abdominal wall related to surgery.  No  abnormal fluid collections.  Stable gallstones.  Current Medications:     Infusions:       Scheduled:   furosemide  20 mg Oral Daily    insulin detemir U-100  25 Units Subcutaneous QHS    metoprolol tartrate  50 mg Oral BID    pravastatin  40 mg Oral QHS        PRN:  acetaminophen, Dextrose 10% Bolus, Dextrose 10% Bolus, glucagon (human recombinant), glucose, glucose, insulin aspart U-100, ondansetron, sodium chloride 0.9%, sodium chloride 0.9%    Antibiotics and Day Number of Therapy:  None     Lines and Day Number of Therapy:  piv    Assessment:     Yajaira Terry is a 74 y.o.female with  Patient Active Problem List    Diagnosis Date Noted    Chest pain 11/23/2019    Atrial fibrillation with RVR 11/23/2019    Chronic HFrEF (heart failure with reduced ejection fraction) 11/23/2019    Renal cell carcinoma 11/23/2019    H/O right nephrectomy 11/23/2019    Type 2 diabetes mellitus, with long-term current use of insulin 11/23/2019    EDGAR (acute kidney injury) 11/23/2019    Hyperkalemia 11/23/2019    Normocytic anemia 11/23/2019    Asymptomatic bacteriuria 11/23/2019    Mitral regurgitation 11/23/2019    Tricuspid regurgitation 11/23/2019    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/23/2019    CKD (chronic kidney disease), stage III     Abnormal stress test 07/19/2017    Urinary tract infection, site not specified 04/12/2013    Dysuria-frequency syndrome 04/12/2013        Plan:   Afib with RVR  - 74F with Afib (on coumadin), RCC s/p R nephrectomy (Monday of this week), chronic systolic HF (EF 10%), DMII and HTN who presents from [facility] for what was allegedly chest pain though she reports she is having pain in her L groin and back  - initial EKG with A fib and mildly increased HR, inferolateral ST depression (seen prior) and normal troponin negative  - serial troponin ordered by ED staff but do not see a need to continue checking  - given 50 lopressor in ED, will increased to  50 bid on 11/23  - coumadin is being held perioperatively, will need to follow up with surgery in order to determine when is okay to resart  - HR overnight  , consider increasing lopressor to 100 BID     EDGAR with hyperkalemia  - Cr elevated from baseline and K 5.8; no cocnerning EKG changes  - will give calcium and shift with insulin/D50 and give kayexalate  - FeNa 3.4% suggesting intrinsic   - Cr stable at 1.8 today with K 4.2      RCC s/p R nephrectomy  - removed Monday  - incisions look okay, continue outpatient management with hem-onc     Chronic HFrEF  - last EF 10 % in 2017, appears euvolemic  - needs optimization of GDMT     DMII  - last A1c 8.3 in 2018, A1C 7.9 on recheck   - allegedly on 55 units long acting and 15 with meals but hesitant to trust she takes that much regularly  - will start with sliding scale and 25 units long acting  - Glu this am 76 with 25 of long acting      Moderate to severe TR  - appears evuolemic, checking TTE     MR, mild  - appears euvolemic  - checking TTE     Normocytic anemia  - H/H 1.7/37.1 on admit  - Hb 10.8 today   - Fe, Ferritin, B12, and Folate WNL      Asymptomatic bacteruria  - 6 WBCs and occasional bacteria but without symptoms; will defer treating for now but will re-consider considering recent surgery if she fevers or develops a leukocytosis      HCM  - needs vaccines    Precious Tim  [facility] Internal Medicine HO-I    [facility] Medicine Hospitalist Pager numbers:   [facility] Hospitalist Medicine Team A (Duncan/Jenifer): [phone]  [facility] Hospitalist Medicine Team B (Keven/Odalys):  [phone]

## 2019-11-24 NOTE — DISCHARGE SUMMARY
[facility] Hospital Medicine Discharge Summary    Primary Team: [facility] Hospitalist Team A  Attending Physician: Jenifer  Resident: Jenifer  Intern: Meeta     Date of Admit: 11/22/2019  Date of Discharge: 11/24/2019    Discharge to: home  Condition: stable     Discharge Diagnoses     Patient Active Problem List   Diagnosis    Urinary tract infection, site not specified    Dysuria-frequency syndrome    Abnormal stress test    Chest pain    Atrial fibrillation with RVR    Chronic HFrEF (heart failure with reduced ejection fraction)    Renal cell carcinoma    H/O right nephrectomy    Type 2 diabetes mellitus, with long-term current use of insulin    EDGAR (acute kidney injury)    Hyperkalemia    Normocytic anemia    Asymptomatic bacteriuria    Mitral regurgitation    Tricuspid regurgitation    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 No evidence of pulmonary embolism.  No definite infiltrates or consolidation.  Dependent lung changes at the bases     CT AP: Interval right nephrectomy.  Small amount of air is seen in the low right retroperitoneum.  There is also air noted in the right anterior abdominal wall related to surgery.  No abnormal fluid collections.  Stable gallstones.    Brief History of Present Illness   Yajaira Terry is a 74 y.o.F with Afib (on coumadin), RCC s/p R nephrectomy (Monday of this week), chronic systolic HF (EF 10%), DMII and HTN who presents from [facility] for what was allegedly chest pain though she reports she is having pain in her L groin and back.     The patient was in their usual state of health--ambulates around house with ease but has some chronic SOB--until having surgery on Monday. Since surgery she has done well and has been without singificant pain, fevers, chills. For the past two days  she's been having pain in her left groin (surgery on R) and some back pain on the left as well that radiates down her leg. She has not had fevers, chills, dysuria or hematuria.  She reports stability of her chronic SOB and has had no significant chest pain, palpitations or lightheadedness.     For the full HPI please refer to the History & Physical from this admission.    Patient admitted for A-fib with RVR and EDGAR with hyperkalemia. Patients lopressor was increased from 25 BID to 100 BID. Cr stayed stable which is expected since this EDGAR is thought to be NSAID induced. She was shifted with resolution of hyperkalemia. Patient was educated on importance of cessation from NSAIDs.  Hospital Course By Problem with Pertinent Findings     Afib with RVR  - 74F with Afib (on coumadin), RCC s/p R nephrectomy (Monday of this week), chronic systolic HF (EF 10%), DMII and HTN who presents from [facility] for what was allegedly chest pain though she reports she is having pain in her L groin and back  - initial EKG with A fib and mildly increased HR, inferolateral ST depression (seen prior) and normal troponin negative  - serial troponin ordered by ED staff but do not see a need to continue checking  - given 50 lopressor in ED, will increased to 50 bid on 11/23  - coumadin is being held perioperatively, will need to follow up with surgery in order to determine when is okay to resart  - HR overnight  , lopressor increased to 100 BID      EDGAR with hyperkalemia  - Cr elevated from baseline and K 5.8; no cocnerning EKG changes  - will give calcium and shift with insulin/D50 and give kayexalate  - FeNa 3.4% suggesting intrinsic   - Cr stable at 1.8 today with K 4.2      RCC s/p R nephrectomy  - removed Monday  - incisions look okay, continue outpatient management with hem-onc     Chronic HFrEF  - last EF 10 % in 2017, appears euvolemic  - needs optimization of GDMT  - holding ACEi at discharge due to kidney function      DMII  - last  A1c 8.3 in 2018, A1C 7.9 on recheck   - allegedly on 55 units long acting and 15 with meals but hesitant to trust she takes that much regularly  - will start with sliding scale and 25 units long acting  - Glu this am 76 with 25 of long acting     Normocytic anemia  - H/H 1.7/37.1 on admit  - Hb 10.8 today   - Fe, Ferritin, B12, and Folate WNL      Asymptomatic bacteruria  - 6 WBCs and occasional bacteria but without symptoms; will defer treating for now but will re-consider considering recent surgery if she fevers or develops a leukocytosis      Discharge Medications      Yajaira Terry   Home Medication Instructions RADHA:[id number]    Printed on:11/24/19 8624   Medication Information                      CALCIUM & MAGNESIUM CARBONATES ORAL  Take by mouth.             diclofenac sodium (VOLTAREN) 1 % Gel  Apply 2 g topically every 6 (six) hours as needed. For pain.             furosemide (LASIX) 20 MG tablet  Take 20 mg by mouth 2 (two) times daily.             INSULIN ASPART (NOVOLOG SUBQ)  Inject into the skin.             INSULIN GLARGINE,HUM.REC.ANLOG (LANTUS SUBQ)  Inject 40 Units into the skin once daily at 6am. Takes med at 6pm.             metoprolol tartrate (LOPRESSOR) 50 MG tablet  Take 2 tablets (100 mg total) by mouth 2 (two) times daily.             phenazopyridine (PYRIDIUM) 200 MG tablet  Take 1 tablet (200 mg total) by mouth 3 (three) times daily.             pravastatin (PRAVACHOL) 40 MG tablet  Take 1 tablet (40 mg total) by mouth every evening.             propafenone (RHTHYMOL) 150 MG Tab  Take 150 mg by mouth 3 (three) times daily.             tramadol 50 mg TbDL  Take 50 mg by mouth as needed.             warfarin (COUMADIN) 3 MG tablet  Take 1 tablet (3 mg total) by mouth Daily. Alternate 1 to 1/2 tab qd   Resume when surgeon says it's okay.                 Discharge Information:   Diet:  cardiac    Physical Activity:  Ad mariama              Instructions:  1. Take all medications as  prescribed  2. Keep all follow-up appointments  3. Return to the hospital or call your primary care physicians if any worsening symptoms such as fever, chest pain, shortness of breath, return of symptoms, or any other concerns.    Follow-Up Appointments:  PCP, Renal, and Surgery     Precious Tim MD  [facility] Internal Medicine, [facility]

## 2019-11-24 NOTE — PLAN OF CARE
Patient safety maintained. Cardiac monitoring initiated. Medications administered per order. Monitoring glucose levels and treating as needed per order. Assistance provided as needed.

## 2019-11-24 NOTE — PLAN OF CARE
VN cued into room to review discharge instructions with patient.  Went over diet, medications and when to take them, follow up appointments, make sure to check with surgeon before resuming coumadin.  Pt seemed reluctant about scheduling a follow up appointment, questioning why it was necessary.  VN educated pt on the importance of follow up appointments.  Transport requested.

## 2019-11-24 NOTE — NURSING
Progress notes reviewed. Rounds completed. Introduced self as VN for this shift. Educated pt on VN's role in pt care. Educated pt about plan of care and fall precautions.  Opportunity given for pt's questions.  Patient complaining of diarrhea and muscle cramping. Requesting tylenol. Advised her I will contact her MD. Orders to be placed Primary RN notified.  No additional  questions or concerns expressed at this time. Will continue to monitor.

## 2019-11-26 ENCOUNTER — NURSE TRIAGE (OUTPATIENT)
Dept: ADMINISTRATIVE | Facility: CLINIC | Age: 74
End: 2019-11-26

## 2019-11-26 ENCOUNTER — PATIENT OUTREACH (OUTPATIENT)
Dept: ADMINISTRATIVE | Facility: CLINIC | Age: 74
End: 2019-11-26

## 2019-11-26 LAB — BACTERIA UR CULT: ABNORMAL

## 2019-11-26 NOTE — TELEPHONE ENCOUNTER
Patient returning our phone call, wants to know why we called her? She is very angry, uses expletives during the call, yells that she does not want us to call her again, she can make her own follow up appointment.    Reason for Disposition   [1] Follow-up call to recent contact AND [2] information only call, no triage required    Additional Information   Negative: [1] Caller is not with the adult (patient) AND [2] reporting urgent symptoms   Negative: Lab result questions   Negative: Medication questions   Negative: Caller can't be reached by phone   Negative: Caller has already spoken to PCP or another triager   Negative: RN needs further essential information from caller in order to complete triage   Negative: Requesting regular office appointment   Negative: [1] Caller requesting NON-URGENT health information AND [2] PCP's office is the best resource   Negative: Health Information question, no triage required and triager able to answer question   Negative: General information question, no triage required and triager able to answer question   Negative: Question about upcoming scheduled test, no triage required and triager able to answer question   Negative: [1] Caller is not with the adult (patient) AND [2] probable NON-URGENT symptoms    Protocols used: INFORMATION ONLY CALL-A-

## 2019-11-26 NOTE — TELEPHONE ENCOUNTER
C3 nurse attempted to contact patient. No answer.  C3 nurse attempted to contact Yajaira Garciataine  for a TCC post hospital discharge follow up call. No answer at phone number listed and no voicemail available. The patient does not have a scheduled HOSFU appointment within 7-14 days post hospital discharge date 11/24/19.

## 2019-12-03 NOTE — PATIENT INSTRUCTIONS
Discharge Instructions for Atrial Fibrillation  You have been diagnosed with an abnormal heart rhythm called atrial fibrillation. With this condition, your hearts 2 upper chambers quiver rather than squeeze the blood out in a normal pattern. This leads to an irregular and sometimes rapid heartbeat. Some people will develop associated symptoms such as a flip-flopping heartbeat, chest pain, lightheadedness, or shortness of breath. Other people may have no symptoms at all. Atrial fibrillation is serious because it affects the hearts ability to fill with blood as it should. Blood clots may form. This increases the risk for stroke. Untreated atrial fibrillation can also lead to heart failure. Atrial fibrillation can be controlled. With treatment, most people with atrial fibrillation lead normal lives.  Treatment options  Recommended treatment for atrial fibrillation depends on your age, symptoms, how long you have had atrial fibrillation, and other factors. You will have a complete evaluation to find out if you have any abnormalities that caused your heart to go into atrial fibrillation. This might be blocked heart arteries or a thyroid problem. Your doctor will assess your particular case and discuss choices with you.  Treatment choices may include:  · Treating an underlying disorder that puts you at risk for atrial fibrillation. For example, correcting an abnormal thyroid or electrolyte problem, or treating a blocked heart artery.  · Restoring a normal heart rhythm with an electrical shock (cardioversion) or with an antiarrhythmic medicine (chemical cardioversion)  · Using medicine to control your heart rate in atrial fibrillation.  · Preventing the risk for blood clot and stroke using blood-thinning medicines. Your doctor will tell you what he or she recommends. Choices may include aspirin, clopidogrel, warfarin, dabigatran, rivaroxaban, apixaban, and edoxaban.  · Doing catheter ablation or a surgical maze  procedure. These use different methods to destroy certain areas of heart tissue. This interrupts the electrical signals causing atrial fibrillation. One of these procedures may be a choice when medicines do not work, or as an alternative to long-term medicine.  · Other treatment choices may be recommended for you by your doctor.  Managing risk factors for stroke and preventing heart failure are important parts of any treatment plan for atrial fibrillation.  Home care  · Take your medicines exactly as directed. Dont skip doses.  · Work with your doctor to find the right medicines and doses for you.  · Learn to take your own pulse. Keep a record of your results. Ask your doctor which pulse rates mean that you need medical attention. Slowing your pulse is often the goal of treatment. Ask your doctor if its OK for you to use an automatic machine to check your pulse at home. Sometimes these machines dont count the pulse correctly when you have atrial fibrillation.  · Limit your intake of coffee, tea, cola, and other beverages with caffeine. Talk with your doctor about whether you should eliminate caffeine.  · Avoid over-the-counter medicines that have caffeine in them.  · Let your doctor know what medicines you take, including prescription and over-the-counter medicines, as well as any supplements. They interfere with some medicines given for atrial fibrillation.  · Ask your doctor about whether you can drink alcohol. Some people need to avoid alcohol to better treat atrial fibrillation. If you are taking blood-thinner medicines, alcohol may interfere with them by increasing their effect.  · Never take stimulants such as amphetamines or cocaine. These drugs can speed up your heart rate and trigger atrial fibrillation.  Follow-up care  Follow up with your doctor, or as advised.     When should I call my healthcare provider  Call your healthcare provider right away if you have any of the  following:  · Weakness  · Dizziness  · Fainting  · Fatigue  · Shortness of breath  · Chest pain with increased activity  · A change in the usual regularity of your heartbeat, or an unusually fast heartbeat   Date Last Reviewed: 4/23/2016  © 7611-4814 [employer]. [address] [place] [address], [place] [address]. All rights reserved. This information is not intended as a substitute for professional medical care. Always follow your healthcare professional's instructions.

## 2019-12-31 ENCOUNTER — LAB VISIT (OUTPATIENT)
Dept: LAB | Facility: HOSPITAL | Age: 74
End: 2019-12-31
Attending: SPECIALIST
Payer: MEDICARE

## 2019-12-31 DIAGNOSIS — E87.5 HYPERKALEMIA: Primary | ICD-10-CM

## 2019-12-31 LAB
ANION GAP SERPL CALC-SCNC: 5 MMOL/L (ref 8–16)
BUN SERPL-MCNC: 37 MG/DL (ref 7–17)
CALCIUM SERPL-MCNC: 9.4 MG/DL (ref 8.7–10.5)
CHLORIDE SERPL-SCNC: 103 MMOL/L (ref 95–110)
CO2 SERPL-SCNC: 31 MMOL/L (ref 23–29)
CREAT SERPL-MCNC: 1.73 MG/DL (ref 0.5–1.4)
EST. GFR  (AFRICAN AMERICAN): 32.8 ML/MIN/1.73 M^2
EST. GFR  (NON AFRICAN AMERICAN): 28.5 ML/MIN/1.73 M^2
GLUCOSE SERPL-MCNC: 193 MG/DL (ref 70–110)
POTASSIUM SERPL-SCNC: 4.4 MMOL/L (ref 3.5–5.1)
SODIUM SERPL-SCNC: 139 MMOL/L (ref 136–145)

## 2019-12-31 PROCEDURE — 80048 BASIC METABOLIC PNL TOTAL CA: CPT | Mod: PO

## 2020-01-21 ENCOUNTER — LAB VISIT (OUTPATIENT)
Dept: LAB | Facility: HOSPITAL | Age: 75
End: 2020-01-21
Attending: UROLOGY
Payer: MEDICARE

## 2020-01-21 DIAGNOSIS — I48.91 UNSPECIFIED ATRIAL FIBRILLATION: ICD-10-CM

## 2020-01-21 DIAGNOSIS — C64.9 RENAL CELL CARCINOMA: ICD-10-CM

## 2020-01-21 LAB
INR PPP: 2.7 (ref 0.8–1.2)
PROTHROMBIN TIME: 28.3 SEC (ref 9–12.5)

## 2020-01-21 PROCEDURE — 85610 PROTHROMBIN TIME: CPT | Mod: PO

## 2020-02-13 ENCOUNTER — PATIENT OUTREACH (OUTPATIENT)
Dept: ADMINISTRATIVE | Facility: HOSPITAL | Age: 75
End: 2020-02-13

## 2020-02-27 ENCOUNTER — OFFICE VISIT (OUTPATIENT)
Dept: INTERNAL MEDICINE | Facility: CLINIC | Age: 75
End: 2020-02-27
Payer: COMMERCIAL

## 2020-02-27 VITALS
HEART RATE: 92 BPM | OXYGEN SATURATION: 97 % | BODY MASS INDEX: 33.87 KG/M2 | HEIGHT: 61 IN | DIASTOLIC BLOOD PRESSURE: 70 MMHG | WEIGHT: 179.38 LBS | SYSTOLIC BLOOD PRESSURE: 90 MMHG

## 2020-02-27 DIAGNOSIS — I48.91 ATRIAL FIBRILLATION WITH RVR: Primary | ICD-10-CM

## 2020-02-27 DIAGNOSIS — E11.22 TYPE 2 DIABETES MELLITUS WITH STAGE 4 CHRONIC KIDNEY DISEASE, WITH LONG-TERM CURRENT USE OF INSULIN: ICD-10-CM

## 2020-02-27 DIAGNOSIS — E78.00 PURE HYPERCHOLESTEROLEMIA: ICD-10-CM

## 2020-02-27 DIAGNOSIS — I34.0 MITRAL VALVE INSUFFICIENCY, UNSPECIFIED ETIOLOGY: ICD-10-CM

## 2020-02-27 DIAGNOSIS — R06.09 DOE (DYSPNEA ON EXERTION): ICD-10-CM

## 2020-02-27 DIAGNOSIS — I50.22 CHRONIC HFREF (HEART FAILURE WITH REDUCED EJECTION FRACTION): ICD-10-CM

## 2020-02-27 DIAGNOSIS — I07.1 TRICUSPID VALVE INSUFFICIENCY, UNSPECIFIED ETIOLOGY: ICD-10-CM

## 2020-02-27 DIAGNOSIS — N18.4 CKD (CHRONIC KIDNEY DISEASE), STAGE IV: ICD-10-CM

## 2020-02-27 DIAGNOSIS — C64.1 RENAL CELL CARCINOMA OF RIGHT KIDNEY: ICD-10-CM

## 2020-02-27 DIAGNOSIS — Z79.4 TYPE 2 DIABETES MELLITUS WITH STAGE 4 CHRONIC KIDNEY DISEASE, WITH LONG-TERM CURRENT USE OF INSULIN: ICD-10-CM

## 2020-02-27 DIAGNOSIS — R27.0 ATAXIA: ICD-10-CM

## 2020-02-27 DIAGNOSIS — M81.0 AGE-RELATED OSTEOPOROSIS WITHOUT CURRENT PATHOLOGICAL FRACTURE: ICD-10-CM

## 2020-02-27 DIAGNOSIS — N18.4 TYPE 2 DIABETES MELLITUS WITH STAGE 4 CHRONIC KIDNEY DISEASE, WITH LONG-TERM CURRENT USE OF INSULIN: ICD-10-CM

## 2020-02-27 DIAGNOSIS — E11.3593 PROLIFERATIVE DIABETIC RETINOPATHY OF BOTH EYES ASSOCIATED WITH TYPE 2 DIABETES MELLITUS, UNSPECIFIED PROLIFERATIVE RETINOPATHY TYPE: ICD-10-CM

## 2020-02-27 DIAGNOSIS — D64.9 NORMOCYTIC ANEMIA: ICD-10-CM

## 2020-02-27 PROBLEM — N17.9 AKI (ACUTE KIDNEY INJURY): Status: RESOLVED | Noted: 2019-11-23 | Resolved: 2020-02-27

## 2020-02-27 PROBLEM — T39.395A NEPHROPATHY DUE TO NONSTEROIDAL ANTI-INFLAMMATORY DRUG (NSAID): Status: RESOLVED | Noted: 2019-11-23 | Resolved: 2020-02-27

## 2020-02-27 PROBLEM — R94.39 ABNORMAL STRESS TEST: Status: RESOLVED | Noted: 2017-07-19 | Resolved: 2020-02-27

## 2020-02-27 PROBLEM — R07.9 CHEST PAIN: Status: RESOLVED | Noted: 2019-11-23 | Resolved: 2020-02-27

## 2020-02-27 PROBLEM — N14.19 NEPHROPATHY DUE TO NONSTEROIDAL ANTI-INFLAMMATORY DRUG (NSAID): Status: RESOLVED | Noted: 2019-11-23 | Resolved: 2020-02-27

## 2020-02-27 PROBLEM — R82.71 ASYMPTOMATIC BACTERIURIA: Status: RESOLVED | Noted: 2019-11-23 | Resolved: 2020-02-27

## 2020-02-27 PROBLEM — E87.5 HYPERKALEMIA: Status: RESOLVED | Noted: 2019-11-23 | Resolved: 2020-02-27

## 2020-02-27 PROCEDURE — 99215 OFFICE O/P EST HI 40 MIN: CPT | Mod: 25,S$GLB,, | Performed by: INTERNAL MEDICINE

## 2020-02-27 PROCEDURE — 1159F MED LIST DOCD IN RCRD: CPT | Mod: S$GLB,,, | Performed by: INTERNAL MEDICINE

## 2020-02-27 PROCEDURE — 1125F AMNT PAIN NOTED PAIN PRSNT: CPT | Mod: S$GLB,,, | Performed by: INTERNAL MEDICINE

## 2020-02-27 PROCEDURE — 99215 PR OFFICE/OUTPT VISIT, EST, LEVL V, 40-54 MIN: ICD-10-PCS | Mod: 25,S$GLB,, | Performed by: INTERNAL MEDICINE

## 2020-02-27 PROCEDURE — 3051F HG A1C>EQUAL 7.0%<8.0%: CPT | Mod: CPTII,S$GLB,, | Performed by: INTERNAL MEDICINE

## 2020-02-27 PROCEDURE — 1125F PR PAIN SEVERITY QUANTIFIED, PAIN PRESENT: ICD-10-PCS | Mod: S$GLB,,, | Performed by: INTERNAL MEDICINE

## 2020-02-27 PROCEDURE — 1101F PR PT FALLS ASSESS DOC 0-1 FALLS W/OUT INJ PAST YR: ICD-10-PCS | Mod: CPTII,S$GLB,, | Performed by: INTERNAL MEDICINE

## 2020-02-27 PROCEDURE — 1159F PR MEDICATION LIST DOCUMENTED IN MEDICAL RECORD: ICD-10-PCS | Mod: S$GLB,,, | Performed by: INTERNAL MEDICINE

## 2020-02-27 PROCEDURE — 90662 FLU VACCINE - HIGH DOSE (65+) PRESERVATIVE FREE IM: ICD-10-PCS | Mod: S$GLB,,, | Performed by: INTERNAL MEDICINE

## 2020-02-27 PROCEDURE — 90471 FLU VACCINE - HIGH DOSE (65+) PRESERVATIVE FREE IM: ICD-10-PCS | Mod: S$GLB,,, | Performed by: INTERNAL MEDICINE

## 2020-02-27 PROCEDURE — 99999 PR PBB SHADOW E&M-EST. PATIENT-LVL IV: CPT | Mod: PBBFAC,,, | Performed by: INTERNAL MEDICINE

## 2020-02-27 PROCEDURE — 3051F PR MOST RECENT HEMOGLOBIN A1C LEVEL 7.0 - < 8.0%: ICD-10-PCS | Mod: CPTII,S$GLB,, | Performed by: INTERNAL MEDICINE

## 2020-02-27 PROCEDURE — 99999 PR PBB SHADOW E&M-EST. PATIENT-LVL IV: ICD-10-PCS | Mod: PBBFAC,,, | Performed by: INTERNAL MEDICINE

## 2020-02-27 PROCEDURE — 1101F PT FALLS ASSESS-DOCD LE1/YR: CPT | Mod: CPTII,S$GLB,, | Performed by: INTERNAL MEDICINE

## 2020-02-27 PROCEDURE — 90662 IIV NO PRSV INCREASED AG IM: CPT | Mod: S$GLB,,, | Performed by: INTERNAL MEDICINE

## 2020-02-27 PROCEDURE — 90471 IMMUNIZATION ADMIN: CPT | Mod: S$GLB,,, | Performed by: INTERNAL MEDICINE

## 2020-02-27 RX ORDER — DOCUSATE SODIUM 100 MG/1
100 CAPSULE, LIQUID FILLED ORAL 2 TIMES DAILY
COMMUNITY

## 2020-02-27 RX ORDER — METOPROLOL TARTRATE 50 MG/1
100 TABLET ORAL 2 TIMES DAILY
Qty: 360 TABLET | Refills: 3 | Status: SHIPPED | OUTPATIENT
Start: 2020-02-27 | End: 2020-11-23 | Stop reason: SDUPTHER

## 2020-02-27 NOTE — LETTER
February 27, 2020      No Recipients           [facility] Internal Medicine  [address] [place] [address], [address]  [place] [address]  Phone: [phone]  Fax: [phone]          Patient: Yajaira Terry   MR Number: [id number]   YOB: 1945   Date of Visit: 2/27/2020       Dear :    Thank you for referring Yajaira Terry to me for evaluation. Attached you will find relevant portions of my assessment and plan of care.    If you have questions, please do not hesitate to call me. I look forward to following Yajaira Terry along with you.    Sincerely,    Wil Maya MD    Enclosure  CC:  No Recipients    If you would like to receive this communication electronically, please contact [email or web address] or [phone] to request more information on [employer] Link access.    For providers and/or their staff who would like to refer a patient to [facility], please contact us through our one-stop-shop provider referral line, [facility], at [phone].    If you feel you have received this communication in error or would no longer like to receive these types of communications, please e-mail [email or web address]

## 2020-02-27 NOTE — PROGRESS NOTES
Subjective:       Patient ID: Yajaira Terry is a 74 y.o. female.    Chief Complaint: Establish Care and Medication Refill (metoprolol)    HPI  Pt establishing care.  S/P R nephrectomy at  in Nov.  Hx of CHF, CKD IV, DM.  C/O NICOLE, also unsteady on feet, fell recently.  No orthopnea.  No LE edema.  Has a remote hx of osteoporosis.  Got off of her statin due to concerns re myalgias..  No CP.  No paresthesias.  Seeing ophtho for diab retinopathy.  No N/V/D.  No bleeding.  FBSs 150-160, not checking nonfasting sugars.  Review of Systems   All other systems reviewed and are negative.      Objective:      Physical Exam   Constitutional: She appears well-developed.   HENT:   Head: Normocephalic.   Eyes: EOM are normal.   Neck: Normal range of motion.   Cardiovascular: Normal rate, normal heart sounds and intact distal pulses.   Pulses:       Dorsalis pedis pulses are 2+ on the right side, and 2+ on the left side.        Posterior tibial pulses are 2+ on the right side, and 2+ on the left side.   irreg irreg   Pulmonary/Chest: Effort normal and breath sounds normal.   Abdominal: Soft. Bowel sounds are normal. She exhibits no distension. There is no tenderness.   Musculoskeletal: Normal range of motion. She exhibits no edema.        Right foot: There is normal range of motion and no deformity.        Left foot: There is normal range of motion and no deformity.   Feet:   Right Foot:   Protective Sensation: 6 sites tested. 6 sites sensed.   Left Foot:   Protective Sensation: 6 sites tested. 6 sites sensed.   Lymphadenopathy:     She has no cervical adenopathy.   Neurological: She is alert. No cranial nerve deficit. She exhibits normal muscle tone. Coordination normal.   Wide-based gait   Skin: Skin is warm and dry.   Psychiatric: She has a normal mood and affect. Her behavior is normal.   Vitals reviewed.      Assessment:       1. Atrial fibrillation with RVR    2. Chronic HFrEF (heart failure with reduced ejection  fraction)    3. Renal cell carcinoma of right kidney    4. Type 2 diabetes mellitus with stage 4 chronic kidney disease, with long-term current use of insulin    5. Normocytic anemia    6. Mitral valve insufficiency, unspecified etiology    7. Tricuspid valve insufficiency, unspecified etiology    8. NICOLE (dyspnea on exertion)    9. CKD (chronic kidney disease), stage IV    10. 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, unspecified proliferative retinopathy type    11. Age-related osteoporosis without current pathological fracture    12. Ataxia    13. Pure hypercholesterolemia        Plan:       Yajaira was seen today for establish care and medication refill.    Diagnoses and all orders for this visit:    Atrial fibrillation with RVR  -     Complete PFT with bronchodilator; Future  -     TSH; Future  -     Ambulatory referral/consult to Anticoagulation Monitoring; Future  -     Protime-INR; Future    Chronic HFrEF (heart failure with reduced ejection fraction)  -     Echo Color Flow Doppler? Yes  -     Brain natriuretic peptide; Future  -     Influenza - High Dose (65+) (PF) (IM)    Renal cell carcinoma of right kidney  -     Influenza - High Dose (65+) (PF) (IM)    Type 2 diabetes mellitus with stage 4 chronic kidney disease, with long-term current use of insulin  -     Hemoglobin A1c; Future    Normocytic anemia  -     CBC auto differential; Future    Mitral valve insufficiency, unspecified etiology  -     Echo Color Flow Doppler? Yes    Tricuspid valve insufficiency, unspecified etiology  -     Echo Color Flow Doppler? Yes    NICOLE (dyspnea on exertion)  -     Complete PFT with bronchodilator; Future    CKD (chronic kidney disease), stage IV  -     CBC auto differential; Future  -     Comprehensive metabolic panel; Future    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, unspecified proliferative retinopathy type   Per  ophtho    Age-related osteoporosis without current pathological fracture  -     DXA Bone Density Spine And Hip; Future    Ataxia  -     CANE FOR HOME USE    Pure hypercholesterolemia  -     Lipid panel; Future      Follow up in about 2 weeks (around 3/12/2020).

## 2020-02-28 ENCOUNTER — ANTI-COAG VISIT (OUTPATIENT)
Dept: CARDIOLOGY | Facility: CLINIC | Age: 75
End: 2020-02-28

## 2020-02-28 ENCOUNTER — TELEPHONE (OUTPATIENT)
Dept: INTERNAL MEDICINE | Facility: CLINIC | Age: 75
End: 2020-02-28

## 2020-02-28 DIAGNOSIS — Z79.01 LONG TERM (CURRENT) USE OF ANTICOAGULANTS: Primary | ICD-10-CM

## 2020-02-28 DIAGNOSIS — Z12.11 COLON CANCER SCREENING: ICD-10-CM

## 2020-02-28 DIAGNOSIS — I48.91 ATRIAL FIBRILLATION WITH RVR: ICD-10-CM

## 2020-02-28 RX ORDER — INSULIN GLARGINE 100 [IU]/ML
INJECTION, SOLUTION SUBCUTANEOUS
Qty: 3 VIAL | Refills: 4 | Status: SHIPPED | OUTPATIENT
Start: 2020-02-28 | End: 2021-03-15

## 2020-02-28 NOTE — PROGRESS NOTES
73 y/o female on OAC for stroke prevention with atrial fibrillation (CHADSvasc=5). Other PMH includes CHF, DM, mitral and tricuspid valve insufficiency, CKD, HLD, osteoporosis.

## 2020-02-28 NOTE — PROGRESS NOTES
Chart routed to me to contact patient in regards to enrollment to clinic.  I attempted twice to call patient with no answer and was unable to leave voice message for patient at those times.  I mailed a letter to patient to inform her to contact us to complete enrollment.    3/2/20  Patient verified her Coumadin as 3 mg tablet which she takes 1/2 tab M/W/F/Sat and 1 tab T/TH/Sun.  She states she has been on this dose for a year.  I instructed patient to take Coumadin daily after 5 pm and INR on 3/5/20 at [facility] which she verbalized understanding.  Patient refused new patient education appointment and completed verbally during enrollment.  Diet/when to call sheets mailed to patient's home.  Patient states she is refusing to have INR any more than once a month.  Patient refuses to go to any other lab for INR than [facility] lab.  Chart routed to pharmacist to review.

## 2020-02-28 NOTE — TELEPHONE ENCOUNTER
----- Message from Sofia Singh sent at 2/28/2020 11:12 AM CST -----  Contact: [phone]/ Yasmany with [facility]  Yasmany with [facility] would like to speak with you in regards to patient medication insulin glargine (LANTUS U-100 INSULIN) 100 unit/mL injection. Please call.

## 2020-02-28 NOTE — TELEPHONE ENCOUNTER
Please read enclosed message and advice,   Pharmacist stated that pt is on the pen, but you are ordering vials, please clarify.    Thanks

## 2020-03-03 ENCOUNTER — TELEPHONE (OUTPATIENT)
Dept: INTERNAL MEDICINE | Facility: CLINIC | Age: 75
End: 2020-03-03

## 2020-03-03 ENCOUNTER — OFFICE VISIT (OUTPATIENT)
Dept: CARDIOLOGY | Facility: CLINIC | Age: 75
End: 2020-03-03
Payer: COMMERCIAL

## 2020-03-03 VITALS
SYSTOLIC BLOOD PRESSURE: 102 MMHG | DIASTOLIC BLOOD PRESSURE: 77 MMHG | BODY MASS INDEX: 30.56 KG/M2 | HEIGHT: 64 IN | WEIGHT: 179 LBS | HEART RATE: 124 BPM

## 2020-03-03 DIAGNOSIS — I50.22 CHRONIC HFREF (HEART FAILURE WITH REDUCED EJECTION FRACTION): ICD-10-CM

## 2020-03-03 DIAGNOSIS — N18.4 CKD (CHRONIC KIDNEY DISEASE), STAGE IV: ICD-10-CM

## 2020-03-03 DIAGNOSIS — Z90.5 H/O RIGHT NEPHRECTOMY: ICD-10-CM

## 2020-03-03 DIAGNOSIS — I48.91 ATRIAL FIBRILLATION WITH RVR: Primary | ICD-10-CM

## 2020-03-03 DIAGNOSIS — Z79.01 LONG TERM (CURRENT) USE OF ANTICOAGULANTS: ICD-10-CM

## 2020-03-03 PROCEDURE — 99214 PR OFFICE/OUTPT VISIT, EST, LEVL IV, 30-39 MIN: ICD-10-PCS | Mod: S$GLB,,, | Performed by: STUDENT IN AN ORGANIZED HEALTH CARE EDUCATION/TRAINING PROGRAM

## 2020-03-03 PROCEDURE — 99999 PR PBB SHADOW E&M-EST. PATIENT-LVL III: CPT | Mod: PBBFAC,,, | Performed by: STUDENT IN AN ORGANIZED HEALTH CARE EDUCATION/TRAINING PROGRAM

## 2020-03-03 PROCEDURE — 99999 PR PBB SHADOW E&M-EST. PATIENT-LVL III: ICD-10-PCS | Mod: PBBFAC,,, | Performed by: STUDENT IN AN ORGANIZED HEALTH CARE EDUCATION/TRAINING PROGRAM

## 2020-03-03 PROCEDURE — 99214 OFFICE O/P EST MOD 30 MIN: CPT | Mod: S$GLB,,, | Performed by: STUDENT IN AN ORGANIZED HEALTH CARE EDUCATION/TRAINING PROGRAM

## 2020-03-03 RX ORDER — AMIODARONE HYDROCHLORIDE 200 MG/1
200 TABLET ORAL DAILY
Qty: 30 TABLET | Refills: 5 | Status: SHIPPED | OUTPATIENT
Start: 2020-03-03 | End: 2020-08-18 | Stop reason: SDUPTHER

## 2020-03-03 NOTE — TELEPHONE ENCOUNTER
----- Message from Yajaira Meyers sent at 3/3/2020  1:15 PM CST -----  Contact: pt  Pt would like to be called back regarding scheduling an appt, per Pt need orders from Dr Maya  Pt can be reached at [phone]

## 2020-03-03 NOTE — PROGRESS NOTES
"   Cardiology Clinic note    Subjective:   Patient ID:  Yajaira Terry is a 74 y.o. female who presents for follow-up of systolic heart failure, AF    HPI:   Yajaira Terry  has a past medical history of Atrial fibrillation, CHF (congestive heart failure), Coronary artery disease, Diabetes mellitus, Dyslipidemia, Osteoporosis, unspecified, Tobacco use, Urinary tract infection, and Vaginal infection.  S/P R nephrectomy at  in Nov.  Hx of CHF, CKD IV, DM.  C    Previously saw Dr. Ochoa in 2017.  Move from [facility]. Was seeing Dr. Lakhani over there.    3/3/2020: Here to re-establish cardiac care. Hx of severe NICM, LVEF < 1%, PAF  - now taking lasix 20mg BID and lopressor 100mg BID  - now in AF with RVR. + crackles. Pt sleeps in a recliner. More or less the same for her but high risk for hospitalization due to decompensated HF.        Vitals  Vitals:    03/03/20 1458   BP: 102/77   Pulse: (!) 124   Weight: 81.2 kg (179 lb)   Height: 5' 4" (1.626 m)       Patient Active Problem List    Diagnosis Date Noted    Long term (current) use of anticoagulants 02/28/2020    CKD (chronic kidney disease), stage IV 02/27/2020    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 02/27/2020    Age-related osteoporosis without current pathological fracture 02/27/2020    Atrial fibrillation with RVR 11/23/2019    Chronic HFrEF (heart failure with reduced ejection fraction) 11/23/2019    Renal cell carcinoma 11/23/2019    H/O right nephrectomy 11/23/2019    Type 2 diabetes mellitus, with long-term current use of insulin 11/23/2019    Normocytic anemia 11/23/2019    Mitral regurgitation 11/23/2019    Tricuspid regurgitation 11/23/2019    Urinary tract infection, site not specified 04/12/2013    Dysuria-frequency syndrome 04/12/2013       Patient's Medications   New Prescriptions    AMIODARONE (PACERONE) 200 MG TAB    Take 1 tablet (200 mg total) by mouth once daily.   Previous " Medications    CALCIUM & MAGNESIUM CARBONATES ORAL    Take by mouth.    DICLOFENAC SODIUM (VOLTAREN) 1 % GEL    Apply 2 g topically every 6 (six) hours as needed. For pain.    DOCUSATE SODIUM (COLACE) 100 MG CAPSULE    Take 100 mg by mouth 2 (two) times daily.    FUROSEMIDE (LASIX) 20 MG TABLET    Take 20 mg by mouth 2 (two) times daily.    INSULIN ASPART (NOVOLOG SUBQ)    Inject into the skin.    INSULIN GLARGINE (LANTUS U-100 INSULIN) 100 UNIT/ML INJECTION    55 units  Daily.    METOPROLOL TARTRATE (LOPRESSOR) 50 MG TABLET    Take 2 tablets (100 mg total) by mouth 2 (two) times daily.    PHENAZOPYRIDINE (PYRIDIUM) 200 MG TABLET    Take 1 tablet (200 mg total) by mouth 3 (three) times daily.    PRAVASTATIN (PRAVACHOL) 40 MG TABLET    Take 1 tablet (40 mg total) by mouth every evening.    PROPAFENONE (RHTHYMOL) 150 MG TAB    Take 150 mg by mouth 3 (three) times daily.    TRAMADOL 50 MG TBDL    Take 50 mg by mouth as needed.    WARFARIN (COUMADIN) 3 MG TABLET    Take 1 tablet (3 mg total) by mouth Daily. Alternate 1 to 1/2 tab qd   Resume when surgeon says it's okay.   Modified Medications    No medications on file   Discontinued Medications    No medications on file         Review of Systems   Constitution: Negative for chills, decreased appetite, malaise/fatigue and weight gain.   HENT: Negative for congestion and ear discharge.    Eyes: Negative for blurred vision and double vision.   Cardiovascular: Positive for dyspnea on exertion. Negative for chest pain, cyanosis, irregular heartbeat, leg swelling, near-syncope, orthopnea, palpitations and syncope.   Respiratory: Positive for shortness of breath and sleep disturbances due to breathing. Negative for cough.    Skin: Negative for color change and dry skin.   Musculoskeletal: Negative for joint pain, joint swelling and muscle cramps.   Gastrointestinal: Negative for bloating, heartburn, hematemesis and hematochezia.   Genitourinary: Negative for bladder  incontinence and dysuria.   Neurological: Negative for aphonia, excessive daytime sleepiness, dizziness, focal weakness, headaches, light-headedness, loss of balance and weakness.   Psychiatric/Behavioral: Negative for altered mental status, depression and memory loss. The patient does not have insomnia and is not nervous/anxious.          Objective:   Physical Exam   Constitutional: She is oriented to person, place, and time. She appears well-developed and well-nourished.   HENT:   Head: Normocephalic and atraumatic.   Eyes: Conjunctivae and EOM are normal.   Neck: Normal range of motion. Neck supple. No JVD present.   Cardiovascular: Normal heart sounds. An irregularly irregular rhythm present.   No murmur heard.  Pulmonary/Chest: Effort normal. No respiratory distress. She has no wheezes. She has rales.   Abdominal: Soft. Bowel sounds are normal. She exhibits no distension.   Musculoskeletal: Normal range of motion. She exhibits no edema.   Neurological: She is alert and oriented to person, place, and time.   Skin: Skin is warm and dry. No erythema.   Psychiatric: She has a normal mood and affect. Her behavior is normal. Judgment and thought content normal.   Nursing note and vitals reviewed.      Lab Results    Lab Results   Component Value Date     01/21/2020    K 4.5 01/21/2020     01/21/2020    CO2 34 (H) 01/21/2020    BUN 42 (H) 01/21/2020    CREATININE 1.79 (H) 01/21/2020    GLU 53 (L) 01/21/2020    HGBA1C 7.9 (H) 11/23/2019    MG 2.3 11/22/2019    AST 32 01/21/2020    ALT 21 01/21/2020    ALBUMIN 4.2 01/21/2020    PROT 8.2 01/21/2020    BILITOT 0.5 01/21/2020    WBC 8.14 01/21/2020    HGB 15.9 01/21/2020    HCT 49.7 (H) 01/21/2020    MCV 95 01/21/2020     01/21/2020    INR 2.7 (H) 01/21/2020    TSH 1.060 01/22/2016    CHOL 141 03/13/2019    HDL 39 (L) 03/13/2019    LDLCALC 75.0 03/13/2019    TRIG 135 03/13/2019    BNP 91 09/23/2010       Lipid panel  Lab Results   Component Value Date     CHOL 141 03/13/2019     Lab Results   Component Value Date    HDL 39 (L) 03/13/2019     Lab Results   Component Value Date    LDLCALC 75.0 03/13/2019     Lab Results   Component Value Date    TRIG 135 03/13/2019       Cardiac Studies  Significant Imaging: Echocardiogram:   2D echo with color flow doppler:   Results for orders placed or performed during the hospital encounter of 05/16/17   Echo doppler color flow   Result Value Ref Range    QEF 10 (A) 55 - 65    Mitral Valve Regurgitation MODERATE (A)     Est. PA Systolic Pressure 23.23     Pericardial Effusion NONE     Tricuspid Valve Regurgitation MODERATE TO SEVERE (A)     Narrative    Date of Procedure: 05/16/2017        TEST DESCRIPTION   Technical Quality: This is a technically good study.     General: The patient was in an irregularly irregular rhythm throughout the study.     Aorta: The aortic root is normal in size, measuring 2.7 cm at sinotubular junction.     Left Atrium: The left atrial volume index is normal, measuring 31.45 cc/m2.     Left Ventricle: The left ventricle is normal in size, with an end-diastolic diameter of 5.1 cm, and an end-systolic diameter of 3.7 cm. LV wall thickness is normal, with the septum and the posterior wall each measuring 0.7 cm across. Relative wall   thickness was normal at 0.27, and the LV mass index was 77.0 g/m2 consistent with normal left ventricular mass. The anterior septum is hypokinetic. The anterior wall is hypokinetic. Left ventricular systolic function appears severely depressed. Visually   estimated ejection fraction is 10-15%. The LV Doppler derived stroke volume equals 34.0 ccs.     Diastolic indices: No distinct A waves were identified on assessment of mitral inflow. Diastolic function is indeterminate.     Right Atrium: The right atrium is normal in size, measuring 5.2 cm in length in the apical view.     Right Ventricle: The right ventricle is normal in size. Global right ventricular systolic function  appears mildly depressed. Tricuspid annular plane systolic excursion (TAPSE) is 1.2 cm. The estimated PA systolic pressure is greater than 23 mmHg.     Aortic Valve:  The aortic valve is normal in structure.     Mitral Valve:  The mitral valve is normal in structure. There is moderate mitral regurgitation.     Tricuspid Valve:  The tricuspid valve is mildly sclerotic. There is moderate to severe tricuspid regurgitation.     Pulmonary Valve:  The pulmonic valve is not well seen. There is mild pulmonic regurgitation.     Pericardium: There is no evidence of pericardial effusion.      IVC: The IVC is not visualized.     Intracavitary: There is no evidence of intracavity mass, thrombi, or vegetation.         CONCLUSIONS     1 - Severely depressed left ventricular systolic function (EF 10-15%).     2 - Wall motion abnormalities.     3 - Mildly depressed right ventricular systolic function .     4 - The estimated PA systolic pressure is greater than 23 mmHg.     5 - Moderate mitral regurgitation.     6 - Moderate to severe tricuspid regurgitation.             This document has been electronically    SIGNED BY: Brandan Alvarez MD On: 05/16/2017 13:05    and Transthoracic echo (TTE) complete (Cupid Only): No results found for this or any previous visit.  ECG:  unchanged from previous tracings, atrial fibrillation.    Cath study : hx of normal CAD in 2017      Assessment:     1. Atrial fibrillation with RVR    2. Chronic HFrEF (heart failure with reduced ejection fraction)    3. CKD (chronic kidney disease), stage IV    4. H/O right nephrectomy    5. Long term (current) use of anticoagulants        Plan:     1. Atrial fibrillation with RVR  - on lopressor 100mg BID  - not rate controlled, will try to add amiodaorone 200mg for better AF control  - c/w coumadin, INR 2-3    2. Systolic heart failure,  - mildly decompensated, + crackles on exam  - hopefully will be better when out of RVR  - cant increase lasix due to already  marginal BP  - will follow up in 1 month and up to increase lasix at that time,  Low salt meals    3. CKD< single kidney  - cr 1.7    Continue with current medical plan and lifestyle changes.  Return sooner for concerns or questions. If symptoms persist go to the ED  Total duration of face to face visit time 30 minutes.  Total time spent counseling greater than fifty percent of total visit time.  Counseling included discussion regarding imaging findings, diagnosis, possibilities, treatment options, risks and benefits.      No orders of the defined types were placed in this encounter.      Follow up as scheduled. Return to clinic in 4 weeks   She expressed verbal understanding and agreed with the plan    Thank you for the opportunity to care for this patient. Will be available for questions if needed.     Clifford Sebastian MD [facility]  Interventional Cardiology  [facility]  Pager: [phone]

## 2020-03-04 ENCOUNTER — TELEPHONE (OUTPATIENT)
Dept: INTERNAL MEDICINE | Facility: CLINIC | Age: 75
End: 2020-03-04

## 2020-03-04 NOTE — TELEPHONE ENCOUNTER
----- Message from Yajaira Meyers sent at 3/4/2020  9:51 AM CST -----  Contact: Pt  Pt would like to be called back regarding  pulmonary test  Pt can be reached at [phone]   unk

## 2020-03-04 NOTE — TELEPHONE ENCOUNTER
Please see msg below.  I called patient to advise the purpose of the test  She refuses to schedule anything until speaking with you.

## 2020-03-05 ENCOUNTER — LAB VISIT (OUTPATIENT)
Dept: LAB | Facility: HOSPITAL | Age: 75
End: 2020-03-05
Attending: INTERNAL MEDICINE
Payer: COMMERCIAL

## 2020-03-05 ENCOUNTER — ANTI-COAG VISIT (OUTPATIENT)
Dept: CARDIOLOGY | Facility: CLINIC | Age: 75
End: 2020-03-05
Payer: COMMERCIAL

## 2020-03-05 DIAGNOSIS — Z79.01 LONG TERM (CURRENT) USE OF ANTICOAGULANTS: ICD-10-CM

## 2020-03-05 DIAGNOSIS — I48.91 ATRIAL FIBRILLATION WITH RVR: ICD-10-CM

## 2020-03-05 LAB
INR PPP: 1.6 (ref 0.8–1.2)
PROTHROMBIN TIME: 16.7 SEC (ref 9–12.5)

## 2020-03-05 PROCEDURE — 93793 ANTICOAG MGMT PT WARFARIN: CPT | Mod: S$GLB,,,

## 2020-03-05 PROCEDURE — 93793 PR ANTICOAGULANT MGMT FOR PT TAKING WARFARIN: ICD-10-PCS | Mod: S$GLB,,,

## 2020-03-05 PROCEDURE — 36415 COLL VENOUS BLD VENIPUNCTURE: CPT | Mod: PO

## 2020-03-05 PROCEDURE — 85610 PROTHROMBIN TIME: CPT | Mod: PO

## 2020-03-06 ENCOUNTER — PATIENT OUTREACH (OUTPATIENT)
Dept: ADMINISTRATIVE | Facility: HOSPITAL | Age: 75
End: 2020-03-06

## 2020-03-06 NOTE — PROGRESS NOTES
Patient called to reschedule 3/11 appointment to 3/12, states she will be getting an eye injection on 3/11 and not sure she'll be able to drive so she will go on 3/12/20

## 2020-03-12 ENCOUNTER — LAB VISIT (OUTPATIENT)
Dept: LAB | Facility: HOSPITAL | Age: 75
End: 2020-03-12
Attending: INTERNAL MEDICINE
Payer: COMMERCIAL

## 2020-03-12 DIAGNOSIS — Z79.01 LONG TERM (CURRENT) USE OF ANTICOAGULANTS: ICD-10-CM

## 2020-03-12 LAB
INR PPP: 2.6 (ref 0.8–1.2)
PROTHROMBIN TIME: 27.7 SEC (ref 9–12.5)

## 2020-03-12 PROCEDURE — 36415 COLL VENOUS BLD VENIPUNCTURE: CPT | Mod: PO

## 2020-03-12 PROCEDURE — 85610 PROTHROMBIN TIME: CPT | Mod: PO

## 2020-03-13 ENCOUNTER — ANTI-COAG VISIT (OUTPATIENT)
Dept: CARDIOLOGY | Facility: CLINIC | Age: 75
End: 2020-03-13
Payer: COMMERCIAL

## 2020-03-13 DIAGNOSIS — I48.91 ATRIAL FIBRILLATION WITH RVR: ICD-10-CM

## 2020-03-13 DIAGNOSIS — Z79.01 LONG TERM (CURRENT) USE OF ANTICOAGULANTS: ICD-10-CM

## 2020-03-13 PROCEDURE — 93793 ANTICOAG MGMT PT WARFARIN: CPT | Mod: S$GLB,,,

## 2020-03-13 PROCEDURE — 93793 PR ANTICOAGULANT MGMT FOR PT TAKING WARFARIN: ICD-10-PCS | Mod: S$GLB,,,

## 2020-03-17 ENCOUNTER — TELEPHONE (OUTPATIENT)
Dept: INTERNAL MEDICINE | Facility: CLINIC | Age: 75
End: 2020-03-17

## 2020-03-17 ENCOUNTER — HOSPITAL ENCOUNTER (OUTPATIENT)
Dept: CARDIOLOGY | Facility: HOSPITAL | Age: 75
Discharge: HOME | End: 2020-03-17
Attending: INTERNAL MEDICINE
Payer: COMMERCIAL

## 2020-03-17 ENCOUNTER — HOSPITAL ENCOUNTER (OUTPATIENT)
Dept: RADIOLOGY | Facility: HOSPITAL | Age: 75
Discharge: HOME | End: 2020-03-17
Attending: INTERNAL MEDICINE
Payer: MEDICARE

## 2020-03-17 VITALS — HEIGHT: 64 IN | WEIGHT: 179 LBS | BODY MASS INDEX: 30.56 KG/M2

## 2020-03-17 DIAGNOSIS — M81.0 AGE-RELATED OSTEOPOROSIS WITHOUT CURRENT PATHOLOGICAL FRACTURE: ICD-10-CM

## 2020-03-17 PROCEDURE — 93306 ECHO (CUPID ONLY): ICD-10-PCS | Mod: 26,,, | Performed by: STUDENT IN AN ORGANIZED HEALTH CARE EDUCATION/TRAINING PROGRAM

## 2020-03-17 PROCEDURE — 93306 TTE W/DOPPLER COMPLETE: CPT | Mod: PO

## 2020-03-17 PROCEDURE — 77080 DXA BONE DENSITY AXIAL: CPT | Mod: TC,PO

## 2020-03-17 PROCEDURE — 93306 TTE W/DOPPLER COMPLETE: CPT | Mod: 26,,, | Performed by: STUDENT IN AN ORGANIZED HEALTH CARE EDUCATION/TRAINING PROGRAM

## 2020-03-17 NOTE — TELEPHONE ENCOUNTER
----- Message from Anastasia Gutierrez sent at 3/17/2020  2:48 PM CDT -----  Contact: Self [phone]  Patient would like to speak with you about a personal matter. Please advise

## 2020-03-17 NOTE — TELEPHONE ENCOUNTER
The above pt stated that she went to get her labs drawn and she did not fast, requesting you please add order for   PROTIME-INR [JPD024]  B-TYPE NATRIURETIC PEPTIDE [FNH457]  LIPID PANEL [LAB18], please advice    Thanks

## 2020-03-18 LAB
AORTIC ROOT ANNULUS: 2.59 CM
AV INDEX (PROSTH): 0.75
AV MEAN GRADIENT: 2 MMHG
AV PEAK GRADIENT: 3 MMHG
AV VALVE AREA: 2.14 CM2
AV VELOCITY RATIO: 0.87
BSA FOR ECHO PROCEDURE: 1.91 M2
CV ECHO LV RWT: 0.3 CM
DOP CALC AO PEAK VEL: 0.93 M/S
DOP CALC AO VTI: 14.58 CM
DOP CALC LVOT AREA: 2.9 CM2
DOP CALC LVOT DIAMETER: 1.91 CM
DOP CALC LVOT PEAK VEL: 0.81 M/S
DOP CALC LVOT STROKE VOLUME: 31.19 CM3
DOP CALCLVOT PEAK VEL VTI: 10.89 CM
ECHO LV POSTERIOR WALL: 0.82 CM (ref 0.6–1.1)
FRACTIONAL SHORTENING: 10 % (ref 28–44)
INTERVENTRICULAR SEPTUM: 0.73 CM (ref 0.6–1.1)
IVRT: 83.04 MSEC
LA MAJOR: 5.27 CM
LA MINOR: 5.46 CM
LA WIDTH: 3.71 CM
LEFT ATRIUM SIZE: 3.88 CM
LEFT ATRIUM VOLUME INDEX: 35.2 ML/M2
LEFT ATRIUM VOLUME: 65.62 CM3
LEFT INTERNAL DIMENSION IN SYSTOLE: 4.93 CM (ref 2.1–4)
LEFT VENTRICLE DIASTOLIC VOLUME INDEX: 77.39 ML/M2
LEFT VENTRICLE DIASTOLIC VOLUME: 144.41 ML
LEFT VENTRICLE MASS INDEX: 81 G/M2
LEFT VENTRICLE SYSTOLIC VOLUME INDEX: 61.3 ML/M2
LEFT VENTRICLE SYSTOLIC VOLUME: 114.4 ML
LEFT VENTRICULAR INTERNAL DIMENSION IN DIASTOLE: 5.45 CM (ref 3.5–6)
LEFT VENTRICULAR MASS: 151.29 G
PISA TR MAX VEL: 2.13 M/S
RA MAJOR: 4.7 CM
RA PRESSURE: 3 MMHG
RA WIDTH: 3.07 CM
RIGHT VENTRICULAR END-DIASTOLIC DIMENSION: 3.22 CM
SINUS: 2.31 CM
TR MAX PG: 18 MMHG
TV REST PULMONARY ARTERY PRESSURE: 21 MMHG

## 2020-03-19 ENCOUNTER — LAB VISIT (OUTPATIENT)
Dept: LAB | Facility: HOSPITAL | Age: 75
End: 2020-03-19
Attending: INTERNAL MEDICINE
Payer: COMMERCIAL

## 2020-03-19 ENCOUNTER — TELEPHONE (OUTPATIENT)
Dept: INTERNAL MEDICINE | Facility: CLINIC | Age: 75
End: 2020-03-19

## 2020-03-19 DIAGNOSIS — Z79.4 TYPE 2 DIABETES MELLITUS WITH STAGE 4 CHRONIC KIDNEY DISEASE, WITH LONG-TERM CURRENT USE OF INSULIN: Primary | ICD-10-CM

## 2020-03-19 DIAGNOSIS — N18.4 TYPE 2 DIABETES MELLITUS WITH STAGE 4 CHRONIC KIDNEY DISEASE, WITH LONG-TERM CURRENT USE OF INSULIN: Primary | ICD-10-CM

## 2020-03-19 DIAGNOSIS — N18.4 TYPE 2 DIABETES MELLITUS WITH STAGE 4 CHRONIC KIDNEY DISEASE, WITH LONG-TERM CURRENT USE OF INSULIN: ICD-10-CM

## 2020-03-19 DIAGNOSIS — Z79.4 TYPE 2 DIABETES MELLITUS WITH STAGE 4 CHRONIC KIDNEY DISEASE, WITH LONG-TERM CURRENT USE OF INSULIN: ICD-10-CM

## 2020-03-19 DIAGNOSIS — E11.22 TYPE 2 DIABETES MELLITUS WITH STAGE 4 CHRONIC KIDNEY DISEASE, WITH LONG-TERM CURRENT USE OF INSULIN: Primary | ICD-10-CM

## 2020-03-19 DIAGNOSIS — E11.22 TYPE 2 DIABETES MELLITUS WITH STAGE 4 CHRONIC KIDNEY DISEASE, WITH LONG-TERM CURRENT USE OF INSULIN: ICD-10-CM

## 2020-03-19 LAB
CHOLEST SERPL-MCNC: 160 MG/DL (ref 120–199)
CHOLEST/HDLC SERPL: 3.9 {RATIO} (ref 2–5)
HDLC SERPL-MCNC: 41 MG/DL (ref 40–75)
HDLC SERPL: 25.6 % (ref 20–50)
LDLC SERPL CALC-MCNC: 81.8 MG/DL (ref 63–159)
NONHDLC SERPL-MCNC: 119 MG/DL
TRIGL SERPL-MCNC: 186 MG/DL (ref 30–150)

## 2020-03-19 PROCEDURE — 36415 COLL VENOUS BLD VENIPUNCTURE: CPT | Mod: PO

## 2020-03-19 PROCEDURE — 80061 LIPID PANEL: CPT

## 2020-03-19 NOTE — TELEPHONE ENCOUNTER
----- Message from Elysia Zepeda sent at 3/19/2020 11:06 AM CDT -----  Contact: self, [phone]  Patient called asking how many hours she needs to fast for her lipid panel. States she went in but lab told her to come back. Please advise, lab appt doesn't have a lipid panel order.

## 2020-03-20 ENCOUNTER — OFFICE VISIT (OUTPATIENT)
Dept: INTERNAL MEDICINE | Facility: CLINIC | Age: 75
End: 2020-03-20
Payer: COMMERCIAL

## 2020-03-20 VITALS
HEIGHT: 64 IN | WEIGHT: 180.13 LBS | OXYGEN SATURATION: 97 % | SYSTOLIC BLOOD PRESSURE: 104 MMHG | DIASTOLIC BLOOD PRESSURE: 76 MMHG | HEART RATE: 102 BPM | TEMPERATURE: 98 F | BODY MASS INDEX: 30.75 KG/M2

## 2020-03-20 DIAGNOSIS — E11.22 TYPE 2 DIABETES MELLITUS WITH STAGE 4 CHRONIC KIDNEY DISEASE, WITH LONG-TERM CURRENT USE OF INSULIN: ICD-10-CM

## 2020-03-20 DIAGNOSIS — N18.4 TYPE 2 DIABETES MELLITUS WITH STAGE 4 CHRONIC KIDNEY DISEASE, WITH LONG-TERM CURRENT USE OF INSULIN: ICD-10-CM

## 2020-03-20 DIAGNOSIS — I48.91 ATRIAL FIBRILLATION WITH RVR: ICD-10-CM

## 2020-03-20 DIAGNOSIS — N18.4 CKD (CHRONIC KIDNEY DISEASE), STAGE IV: ICD-10-CM

## 2020-03-20 DIAGNOSIS — I50.22 CHRONIC HFREF (HEART FAILURE WITH REDUCED EJECTION FRACTION): ICD-10-CM

## 2020-03-20 DIAGNOSIS — M81.0 AGE-RELATED OSTEOPOROSIS WITHOUT CURRENT PATHOLOGICAL FRACTURE: ICD-10-CM

## 2020-03-20 DIAGNOSIS — Z79.4 TYPE 2 DIABETES MELLITUS WITH STAGE 4 CHRONIC KIDNEY DISEASE, WITH LONG-TERM CURRENT USE OF INSULIN: ICD-10-CM

## 2020-03-20 DIAGNOSIS — Z00.00 ROUTINE GENERAL MEDICAL EXAMINATION AT A HEALTH CARE FACILITY: Primary | ICD-10-CM

## 2020-03-20 PROCEDURE — 3051F PR MOST RECENT HEMOGLOBIN A1C LEVEL 7.0 - < 8.0%: ICD-10-PCS | Mod: CPTII,S$GLB,, | Performed by: INTERNAL MEDICINE

## 2020-03-20 PROCEDURE — 99397 PER PM REEVAL EST PAT 65+ YR: CPT | Mod: S$GLB,,, | Performed by: INTERNAL MEDICINE

## 2020-03-20 PROCEDURE — 99999 PR PBB SHADOW E&M-EST. PATIENT-LVL IV: CPT | Mod: PBBFAC,,, | Performed by: INTERNAL MEDICINE

## 2020-03-20 PROCEDURE — 3051F HG A1C>EQUAL 7.0%<8.0%: CPT | Mod: CPTII,S$GLB,, | Performed by: INTERNAL MEDICINE

## 2020-03-20 PROCEDURE — 99397 PR PREVENTIVE VISIT,EST,65 & OVER: ICD-10-PCS | Mod: S$GLB,,, | Performed by: INTERNAL MEDICINE

## 2020-03-20 PROCEDURE — 99999 PR PBB SHADOW E&M-EST. PATIENT-LVL IV: ICD-10-PCS | Mod: PBBFAC,,, | Performed by: INTERNAL MEDICINE

## 2020-03-20 RX ORDER — ALENDRONATE SODIUM 35 MG/1
35 TABLET ORAL
Qty: 12 TABLET | Refills: 4 | Status: SHIPPED | OUTPATIENT
Start: 2020-03-20 | End: 2021-04-26 | Stop reason: SDUPTHER

## 2020-03-20 NOTE — PROGRESS NOTES
Subjective:       Patient ID: Yajaira Terry is a 74 y.o. female.    Chief Complaint: Follow-up (with labs)    HPI  Wellness check.  Chart reviewed.  Most BSs 140-180.  Has sig NICOLE, but no orthopnea, PND.  No CP.  Has some LBP.  No falls but not using a cane.  No bleeding.  Review of Systems   All other systems reviewed and are negative.      Objective:      Physical Exam   Constitutional: She appears well-developed.   obese   HENT:   Head: Normocephalic.   Eyes: EOM are normal.   Neck: Normal range of motion.   Cardiovascular: Normal rate, regular rhythm, normal heart sounds and intact distal pulses.   Pulmonary/Chest: Effort normal and breath sounds normal.   Abdominal: Soft. Bowel sounds are normal. She exhibits no distension. There is no tenderness.   Musculoskeletal: Normal range of motion. She exhibits no edema.   Lymphadenopathy:     She has no cervical adenopathy.   Neurological: She is alert. No cranial nerve deficit. She exhibits normal muscle tone. Coordination normal.   Skin: Skin is warm and dry.   Psychiatric: She has a normal mood and affect. Her behavior is normal.   Vitals reviewed.      Assessment:       1. Routine general medical examination at a health care facility    2. Type 2 diabetes mellitus with stage 4 chronic kidney disease, with long-term current use of insulin    3. Chronic HFrEF (heart failure with reduced ejection fraction)    4. Atrial fibrillation with RVR    5. CKD (chronic kidney disease), stage IV    6. Age-related osteoporosis without current pathological fracture        Plan:       Yajaiar was seen today for follow-up.    Diagnoses and all orders for this visit:    Routine general medical examination at a health care facility    Type 2 diabetes mellitus with stage 4 chronic kidney disease, with long-term current use of insulin  -     Hemoglobin A1c; Future   Up Lantus to 60 u qd    Chronic HFrEF (heart failure with reduced ejection fraction)   Cont rx    Atrial fibrillation  with RVR  -     TSH; Future    CKD (chronic kidney disease), stage IV  -     CBC auto differential; Future  -     Comprehensive metabolic panel; Future    Age-related osteoporosis without current pathological fracture  -     alendronate (FOSAMAX) 35 MG tablet; Take 1 tablet (35 mg total) by mouth every 7 days.      Follow up in about 6 months (around 9/20/2020).

## 2020-03-23 ENCOUNTER — HOSPITAL ENCOUNTER (OUTPATIENT)
Dept: RADIOLOGY | Facility: HOSPITAL | Age: 75
Discharge: HOME | End: 2020-03-23
Attending: UROLOGY
Payer: COMMERCIAL

## 2020-03-23 ENCOUNTER — ANTI-COAG VISIT (OUTPATIENT)
Dept: CARDIOLOGY | Facility: CLINIC | Age: 75
End: 2020-03-23
Payer: COMMERCIAL

## 2020-03-23 DIAGNOSIS — C64.9 RENAL CELL CANCER: ICD-10-CM

## 2020-03-23 DIAGNOSIS — Z79.01 LONG TERM (CURRENT) USE OF ANTICOAGULANTS: ICD-10-CM

## 2020-03-23 DIAGNOSIS — I48.91 ATRIAL FIBRILLATION WITH RVR: ICD-10-CM

## 2020-03-23 PROCEDURE — 93793 PR ANTICOAGULANT MGMT FOR PT TAKING WARFARIN: ICD-10-PCS | Mod: S$GLB,,,

## 2020-03-23 PROCEDURE — 74176 CT ABD & PELVIS W/O CONTRAST: CPT | Mod: TC,PO

## 2020-03-23 PROCEDURE — 93793 ANTICOAG MGMT PT WARFARIN: CPT | Mod: S$GLB,,,

## 2020-03-23 PROCEDURE — 71250 CT THORAX DX C-: CPT | Mod: TC,PO

## 2020-03-23 PROCEDURE — 25500020 PHARM REV CODE 255: Mod: PO | Performed by: UROLOGY

## 2020-03-23 RX ADMIN — IOHEXOL 30 ML: 300 INJECTION, SOLUTION INTRAVENOUS at 12:03

## 2020-03-24 ENCOUNTER — TELEPHONE (OUTPATIENT)
Dept: FAMILY MEDICINE | Facility: CLINIC | Age: 75
End: 2020-03-24

## 2020-03-24 NOTE — TELEPHONE ENCOUNTER
----- Message from Latanya Mathis sent at 3/24/2020 12:09 PM CDT -----  Contact: Pt  Patient Advice:    Pt called to speak to the nurse regarding her care and would like to know what information she needs to provide in order to have her records sent to another doctor's office outside of [facility] regarding her Sx.    Pt would like a call back today.    Pt can be reached at [phone]

## 2020-03-25 ENCOUNTER — TELEPHONE (OUTPATIENT)
Dept: FAMILY MEDICINE | Facility: CLINIC | Age: 75
End: 2020-03-25

## 2020-03-25 NOTE — PROGRESS NOTES
MA notes reviewed - 4/9 chosen due to having another appointment (tryingn to consolidate visits). Please schedule per calendar.

## 2020-03-25 NOTE — PROGRESS NOTES
Patient keke 03/25/20  sayging she is not going for labs because it is too soon.She said she has been in range the last 2 times, She said 4/9/20 is too soon.Please advise

## 2020-03-25 NOTE — TELEPHONE ENCOUNTER
----- Message from Julia Montgomery sent at 3/25/2020  8:30 AM CDT -----  Contact: patient  Type:  Test Results    Who Called: patient  Name of Test (Lab/Mammo/Etc): bone density and ultrasound of heart, labs  Would the patient rather a call back or a response via [facility]? call  Best Call Back Number: [phone]  Additional Information: send to Dr. Sebastian

## 2020-03-26 ENCOUNTER — TELEPHONE (OUTPATIENT)
Dept: CARDIOLOGY | Facility: CLINIC | Age: 75
End: 2020-03-26

## 2020-03-26 DIAGNOSIS — N18.4 CKD (CHRONIC KIDNEY DISEASE), STAGE IV: Primary | ICD-10-CM

## 2020-03-26 NOTE — TELEPHONE ENCOUNTER
----- Message from Julia Montgomery sent at 3/26/2020  1:28 PM CDT -----  Contact: [phone]/self  Type:  Needs Medical Advice    Who Called: pt  Advice Regarding: her urologist wants her to drink extra water and she wants to know if this is ok  Would the patient rather a call back or a response via [facility]? call  Best Call Back Number: [phone]  Additional Information: n/a

## 2020-03-26 NOTE — TELEPHONE ENCOUNTER
I called the pt back in regards to this message.  The pt is concerned she is going to drink too much water and it will effect her CHF.     I asked the pt if she is still taking the LASIX 20 mg BID. The pt stated yes, and wants to get a call back from the doctor to make sure she is okay to drink 8 glasses of water a day as advised by her urologist.  I informed the pt I will get this message to Dr. Patrick, who is one of Dr. Sebastian's colleagues.    The pt also stated she had an US and Bone density test done by her PCP. She stated she wants those results as well. I informed her usually her PCP will call her back with those results.   The pt also stated the medication Dr. Sebastian had prescribed is helping- I looked in the pt chart and saw Dr. Sebastian prescribed Amiodarone 200 MG. I asked the pt if that is the medication and she said she doesn't know but probably.    She also stated she has a compressed fracture in her lower lumbar region and it hurts when she walks.    I informed her I will get this message over to the doctor and give her a call back.       Please advise  ND

## 2020-03-31 ENCOUNTER — TELEPHONE (OUTPATIENT)
Dept: CARDIOLOGY | Facility: CLINIC | Age: 75
End: 2020-03-31

## 2020-03-31 NOTE — TELEPHONE ENCOUNTER
Left pt detailed message requesting a call back in regards to rescheduling clinical visit with Dr. Ochoa due to COVID-19 concerns and [facility] office being closed

## 2020-04-04 NOTE — TELEPHONE ENCOUNTER
Called Ms Terry. Reports orthopnea, PND is now better with the increased dose of furosemide. She was told to take more water for her kidney given the Cr. However, given her orthopnea, PND and most recent blood test result, asked her to take water when thirsty (watching amount of intake), and continue to furosemide current regimen. Referral to nephrology discussed. Will schedule for a FUP in 1 month (will reschedule if required to for the pandemic, and touch base instead over the phone at the time). She does not have a smart phone to be able to do a virtual tele health visit.

## 2020-04-06 NOTE — TELEPHONE ENCOUNTER
I called the pt to schedule a 1 month follow up at [facility] with Dr. Patrick. The pt did not answer, but I left a detailed voice message.  I also attempted to put in a 1 month recall, but Dr. Patrick's schedule for [facility] is not open yet.    ND

## 2020-04-23 ENCOUNTER — TELEPHONE (OUTPATIENT)
Dept: FAMILY MEDICINE | Facility: CLINIC | Age: 75
End: 2020-04-23

## 2020-04-23 ENCOUNTER — LAB VISIT (OUTPATIENT)
Dept: LAB | Facility: HOSPITAL | Age: 75
End: 2020-04-23
Attending: INTERNAL MEDICINE
Payer: COMMERCIAL

## 2020-04-23 ENCOUNTER — OFFICE VISIT (OUTPATIENT)
Dept: FAMILY MEDICINE | Facility: CLINIC | Age: 75
End: 2020-04-23
Payer: COMMERCIAL

## 2020-04-23 VITALS
SYSTOLIC BLOOD PRESSURE: 102 MMHG | BODY MASS INDEX: 31.22 KG/M2 | WEIGHT: 182.88 LBS | HEART RATE: 87 BPM | DIASTOLIC BLOOD PRESSURE: 70 MMHG | OXYGEN SATURATION: 98 % | TEMPERATURE: 98 F | HEIGHT: 64 IN

## 2020-04-23 DIAGNOSIS — H65.02 NON-RECURRENT ACUTE SEROUS OTITIS MEDIA OF LEFT EAR: Primary | ICD-10-CM

## 2020-04-23 DIAGNOSIS — Z79.01 LONG TERM (CURRENT) USE OF ANTICOAGULANTS: ICD-10-CM

## 2020-04-23 LAB
INR PPP: 2.7 (ref 0.8–1.2)
PROTHROMBIN TIME: 31 SEC (ref 9–12.5)

## 2020-04-23 PROCEDURE — 85610 PROTHROMBIN TIME: CPT | Mod: PO

## 2020-04-23 PROCEDURE — 1126F PR PAIN SEVERITY QUANTIFIED, NO PAIN PRESENT: ICD-10-PCS | Mod: S$GLB,,, | Performed by: FAMILY MEDICINE

## 2020-04-23 PROCEDURE — 1159F PR MEDICATION LIST DOCUMENTED IN MEDICAL RECORD: ICD-10-PCS | Mod: S$GLB,,, | Performed by: FAMILY MEDICINE

## 2020-04-23 PROCEDURE — 36415 COLL VENOUS BLD VENIPUNCTURE: CPT | Mod: PO

## 2020-04-23 PROCEDURE — 1100F PTFALLS ASSESS-DOCD GE2>/YR: CPT | Mod: CPTII,S$GLB,, | Performed by: FAMILY MEDICINE

## 2020-04-23 PROCEDURE — 99213 OFFICE O/P EST LOW 20 MIN: CPT | Mod: S$GLB,,, | Performed by: FAMILY MEDICINE

## 2020-04-23 PROCEDURE — 99213 PR OFFICE/OUTPT VISIT, EST, LEVL III, 20-29 MIN: ICD-10-PCS | Mod: S$GLB,,, | Performed by: FAMILY MEDICINE

## 2020-04-23 PROCEDURE — 1159F MED LIST DOCD IN RCRD: CPT | Mod: S$GLB,,, | Performed by: FAMILY MEDICINE

## 2020-04-23 PROCEDURE — 3288F PR FALLS RISK ASSESSMENT DOCUMENTED: ICD-10-PCS | Mod: CPTII,S$GLB,, | Performed by: FAMILY MEDICINE

## 2020-04-23 PROCEDURE — 1100F PR PT FALLS ASSESS DOC 2+ FALLS/FALL W/INJURY/YR: ICD-10-PCS | Mod: CPTII,S$GLB,, | Performed by: FAMILY MEDICINE

## 2020-04-23 PROCEDURE — 1126F AMNT PAIN NOTED NONE PRSNT: CPT | Mod: S$GLB,,, | Performed by: FAMILY MEDICINE

## 2020-04-23 PROCEDURE — 3288F FALL RISK ASSESSMENT DOCD: CPT | Mod: CPTII,S$GLB,, | Performed by: FAMILY MEDICINE

## 2020-04-23 RX ORDER — BRIMONIDINE TARTRATE, TIMOLOL MALEATE 2; 5 MG/ML; MG/ML
1 SOLUTION/ DROPS OPHTHALMIC 2 TIMES DAILY
COMMUNITY
Start: 2020-04-12

## 2020-04-23 RX ORDER — INSULIN GLARGINE 100 [IU]/ML
INJECTION, SOLUTION SUBCUTANEOUS
COMMUNITY
Start: 2020-03-27 | End: 2020-07-02 | Stop reason: SDUPTHER

## 2020-04-23 RX ORDER — FLUTICASONE PROPIONATE 50 MCG
1 SPRAY, SUSPENSION (ML) NASAL DAILY
Qty: 15.8 ML | Refills: 0 | Status: SHIPPED | OUTPATIENT
Start: 2020-04-23 | End: 2021-03-26

## 2020-04-23 NOTE — PROGRESS NOTES
Subjective:       Patient ID: Yajaira Terry is a 74 y.o. female.    Chief Complaint: Otalgia (lt ear pain )    Otalgia    There is pain in the left ear. This is a recurrent problem. The current episode started in the past 7 days. The problem occurs every few hours. The problem has been unchanged. There has been no fever. The pain is mild. Associated symptoms include rhinorrhea. Pertinent negatives include no abdominal pain, coughing, diarrhea, ear discharge, headaches, hearing loss, neck pain, rash, sore throat or vomiting. She has tried acetaminophen for the symptoms. The treatment provided mild relief.     Review of Systems   Constitutional: Negative for fatigue and fever.   HENT: Positive for ear pain and rhinorrhea. Negative for congestion, ear discharge, hearing loss, postnasal drip, sinus pressure, sore throat and voice change.    Eyes: Negative for discharge.   Respiratory: Negative for cough, chest tightness, shortness of breath and wheezing.    Cardiovascular: Negative for chest pain.   Gastrointestinal: Negative for abdominal pain, diarrhea and vomiting.   Musculoskeletal: Negative for neck pain.   Skin: Negative for rash.   Neurological: Negative for headaches.       Past Medical History:   Diagnosis Date    Atrial fibrillation     CHF (congestive heart failure)     Coronary artery disease     Diabetes mellitus     Dyslipidemia     Osteoporosis, unspecified     Tobacco use     Urinary tract infection     Vaginal infection      Social History     Socioeconomic History    Marital status:      Spouse name: Not on file    Number of children: Not on file    Years of education: Not on file    Highest education level: Not on file   Occupational History    Not on file   Social Needs    Financial resource strain: Not on file    Food insecurity:     Worry: Not on file     Inability: Not on file    Transportation needs:     Medical: Not on file     Non-medical: Not on file   Tobacco Use  "   Smoking status: Former Smoker     Last attempt to quit: 2017     Years since quittin.9    Smokeless tobacco: Never Used   Substance and Sexual Activity    Alcohol use: No    Drug use: No    Sexual activity: Not Currently   Lifestyle    Physical activity:     Days per week: Not on file     Minutes per session: Not on file    Stress: Not at all   Relationships    Social connections:     Talks on phone: Not on file     Gets together: Not on file     Attends Yarsani service: Not on file     Active member of club or organization: Not on file     Attends meetings of clubs or organizations: Not on file     Relationship status: Not on file   Other Topics Concern    Not on file   Social History Narrative    Not on file       Objective:     /70 (BP Location: Left arm, Patient Position: Sitting)   Pulse 87   Temp 98.4 °F (36.9 °C) (Oral)   Ht 5' 4" (1.626 m)   Wt 83 kg (182 lb 14 oz)   SpO2 98%   BMI 31.39 kg/m²     Physical Exam   Constitutional: She appears well-developed. No distress.   HENT:   Head: Normocephalic.   Right Ear: Tympanic membrane, external ear and ear canal normal.   Left Ear: External ear and ear canal normal. A middle ear effusion is present.   Nose: Rhinorrhea present.   Mouth/Throat: Posterior oropharyngeal erythema present.   Cardiovascular: Normal rate and regular rhythm.   Pulmonary/Chest: Effort normal and breath sounds normal. She has no wheezes.   Lymphadenopathy:     She has no cervical adenopathy.   Skin: Skin is warm and dry. No rash noted.       Assessment:       Non-recurrent acute serous otitis media of left ear  -     fluticasone propionate (FLONASE) 50 mcg/actuation nasal spray; 1 spray (50 mcg total) by Each Nostril route once daily.  Dispense: 15.8 mL; Refill: 0          "

## 2020-04-23 NOTE — TELEPHONE ENCOUNTER
----- Message from Michelle Arndt sent at 4/23/2020 10:44 AM CDT -----  Contact: [phone]/self   Type:  Needs Medical Advice    Who Called:self   Symptoms (please be specific):left ear pain   How long has patient had these symptoms:4 days   Would the patient rather a call back or a response via [facility]?callback  Best Call Back Number:[phone]  Additional Information:N/A

## 2020-04-24 ENCOUNTER — ANTI-COAG VISIT (OUTPATIENT)
Dept: CARDIOLOGY | Facility: CLINIC | Age: 75
End: 2020-04-24
Payer: COMMERCIAL

## 2020-04-24 DIAGNOSIS — Z79.01 LONG TERM (CURRENT) USE OF ANTICOAGULANTS: ICD-10-CM

## 2020-04-24 DIAGNOSIS — I48.91 ATRIAL FIBRILLATION WITH RVR: ICD-10-CM

## 2020-04-24 PROCEDURE — 93793 ANTICOAG MGMT PT WARFARIN: CPT | Mod: S$GLB,,,

## 2020-04-24 PROCEDURE — 93793 PR ANTICOAGULANT MGMT FOR PT TAKING WARFARIN: ICD-10-PCS | Mod: S$GLB,,,

## 2020-05-28 ENCOUNTER — LAB VISIT (OUTPATIENT)
Dept: LAB | Facility: HOSPITAL | Age: 75
End: 2020-05-28
Attending: INTERNAL MEDICINE
Payer: COMMERCIAL

## 2020-05-28 ENCOUNTER — TELEPHONE (OUTPATIENT)
Dept: CARDIOLOGY | Facility: CLINIC | Age: 75
End: 2020-05-28

## 2020-05-28 DIAGNOSIS — I48.91 ATRIAL FIBRILLATION WITH RVR: ICD-10-CM

## 2020-05-28 DIAGNOSIS — Z79.01 LONG TERM (CURRENT) USE OF ANTICOAGULANTS: ICD-10-CM

## 2020-05-28 LAB
INR PPP: 2.6 (ref 0.8–1.2)
PROTHROMBIN TIME: 29.7 SEC (ref 9–12.5)

## 2020-05-28 PROCEDURE — 36415 COLL VENOUS BLD VENIPUNCTURE: CPT | Mod: PO

## 2020-05-28 PROCEDURE — 85610 PROTHROMBIN TIME: CPT | Mod: PO

## 2020-05-28 NOTE — TELEPHONE ENCOUNTER
----- Message from Julia Montgomery sent at 5/28/2020  3:28 PM CDT -----  Contact: patient  Type:  Needs Medical Advice    Who Called: pt  Advice Regarding: pain in head, heart disease  Would the patient rather a call back or a response via [facility]? call  Best Call Back Number: [phone]  Additional Information: wants to be seen tomorrow

## 2020-05-28 NOTE — TELEPHONE ENCOUNTER
"I called the pt back in regards to this message.  I informed the pt Dr. Patrick's soonest appointment in [facility] is not until June 16, 2020. The pt stated "are you kidding me!? I could be dead by then. That is absolutely ridiculous."   I tried to offer the patient an appointment with Dr. Richards at [place] for the earliest appointment available on Monday June 1, 2020. The pt verbally expressed how much she does not want to go to [place] and will never go that way again.    I tried to schedule an appointment with the pt for the [facility] sometime next week, and the pt was rude and started yelling.   She was frustrated and started to yell, "I guess I need to see another cardiologist if this is what is going on. I do not understand why I can not be seen in [facility] tomorrow"    I informed the pt she can go to the nearest Emergency room to seek immediate care. The pt screamed, "OH! TO CATCH A VIRUS! There is no way I am going there."   I informed the pt politely, "we are doing everything we can do to see patients safely at our emergency departments."  The pt started to yell at me again about her frustration. I informed the pt I am transferring her to [facility] so she can talk to someone that will help her. I transferred her to [address] [place] [address]    The pt had called to schedule an appointment because she is feeling pain on the left side in the back of her head.   No SOB or chest pains.   The pt stated she had seen her PCP for an ear concern recently and mentioned to her PCP of minor head aches.     ND  "

## 2020-05-29 ENCOUNTER — ANTI-COAG VISIT (OUTPATIENT)
Dept: CARDIOLOGY | Facility: CLINIC | Age: 75
End: 2020-05-29
Payer: COMMERCIAL

## 2020-05-29 DIAGNOSIS — I48.91 ATRIAL FIBRILLATION WITH RVR: ICD-10-CM

## 2020-05-29 DIAGNOSIS — Z79.01 LONG TERM (CURRENT) USE OF ANTICOAGULANTS: ICD-10-CM

## 2020-05-29 PROCEDURE — 93793 ANTICOAG MGMT PT WARFARIN: CPT | Mod: S$GLB,,,

## 2020-05-29 PROCEDURE — 93793 PR ANTICOAGULANT MGMT FOR PT TAKING WARFARIN: ICD-10-PCS | Mod: S$GLB,,,

## 2020-06-12 RX ORDER — WARFARIN 3 MG/1
TABLET ORAL
Qty: 90 TABLET | Refills: 4 | Status: SHIPPED | OUTPATIENT
Start: 2020-06-12 | End: 2021-08-04

## 2020-06-30 ENCOUNTER — LAB VISIT (OUTPATIENT)
Dept: LAB | Facility: HOSPITAL | Age: 75
End: 2020-06-30
Attending: INTERNAL MEDICINE
Payer: COMMERCIAL

## 2020-06-30 DIAGNOSIS — Z79.01 LONG TERM (CURRENT) USE OF ANTICOAGULANTS: ICD-10-CM

## 2020-06-30 DIAGNOSIS — I48.91 ATRIAL FIBRILLATION WITH RVR: ICD-10-CM

## 2020-06-30 LAB
INR PPP: 1.9 (ref 0.8–1.2)
PROTHROMBIN TIME: 20.3 SEC (ref 9–12.5)

## 2020-06-30 PROCEDURE — 36415 COLL VENOUS BLD VENIPUNCTURE: CPT | Mod: PO

## 2020-06-30 PROCEDURE — 85610 PROTHROMBIN TIME: CPT | Mod: PO

## 2020-07-01 ENCOUNTER — ANTI-COAG VISIT (OUTPATIENT)
Dept: CARDIOLOGY | Facility: CLINIC | Age: 75
End: 2020-07-01
Payer: COMMERCIAL

## 2020-07-01 DIAGNOSIS — I48.91 ATRIAL FIBRILLATION WITH RVR: ICD-10-CM

## 2020-07-01 DIAGNOSIS — Z79.01 LONG TERM (CURRENT) USE OF ANTICOAGULANTS: ICD-10-CM

## 2020-07-01 PROCEDURE — 93793 ANTICOAG MGMT PT WARFARIN: CPT | Mod: S$GLB,,,

## 2020-07-01 PROCEDURE — 93793 PR ANTICOAGULANT MGMT FOR PT TAKING WARFARIN: ICD-10-PCS | Mod: S$GLB,,,

## 2020-07-23 ENCOUNTER — ANTI-COAG VISIT (OUTPATIENT)
Dept: CARDIOLOGY | Facility: CLINIC | Age: 75
End: 2020-07-23
Payer: COMMERCIAL

## 2020-07-23 ENCOUNTER — LAB VISIT (OUTPATIENT)
Dept: LAB | Facility: HOSPITAL | Age: 75
End: 2020-07-23
Attending: INTERNAL MEDICINE
Payer: COMMERCIAL

## 2020-07-23 ENCOUNTER — HOSPITAL ENCOUNTER (OUTPATIENT)
Dept: RADIOLOGY | Facility: HOSPITAL | Age: 75
Discharge: HOME | End: 2020-07-23
Attending: UROLOGY
Payer: COMMERCIAL

## 2020-07-23 DIAGNOSIS — I48.91 ATRIAL FIBRILLATION WITH RVR: ICD-10-CM

## 2020-07-23 DIAGNOSIS — C64.9 RENAL CELL CARCINOMA: ICD-10-CM

## 2020-07-23 DIAGNOSIS — Z79.01 LONG TERM (CURRENT) USE OF ANTICOAGULANTS: ICD-10-CM

## 2020-07-23 LAB
INR PPP: 3 (ref 0.8–1.2)
PROTHROMBIN TIME: 31 SEC (ref 9–12.5)

## 2020-07-23 PROCEDURE — 71250 CT THORAX DX C-: CPT | Mod: TC,PO

## 2020-07-23 PROCEDURE — 93793 PR ANTICOAGULANT MGMT FOR PT TAKING WARFARIN: ICD-10-PCS | Mod: S$GLB,,,

## 2020-07-23 PROCEDURE — 85610 PROTHROMBIN TIME: CPT | Mod: PO

## 2020-07-23 PROCEDURE — 74176 CT ABD & PELVIS W/O CONTRAST: CPT | Mod: TC,PO

## 2020-07-23 PROCEDURE — 93793 ANTICOAG MGMT PT WARFARIN: CPT | Mod: S$GLB,,,

## 2020-08-18 RX ORDER — AMIODARONE HYDROCHLORIDE 200 MG/1
200 TABLET ORAL DAILY
Qty: 30 TABLET | Refills: 5 | Status: SHIPPED | OUTPATIENT
Start: 2020-08-18 | End: 2021-03-03

## 2020-08-20 ENCOUNTER — LAB VISIT (OUTPATIENT)
Dept: LAB | Facility: HOSPITAL | Age: 75
End: 2020-08-20
Attending: INTERNAL MEDICINE
Payer: COMMERCIAL

## 2020-08-20 DIAGNOSIS — Z79.01 LONG TERM (CURRENT) USE OF ANTICOAGULANTS: ICD-10-CM

## 2020-08-20 DIAGNOSIS — I48.91 ATRIAL FIBRILLATION WITH RVR: ICD-10-CM

## 2020-08-20 LAB
INR PPP: 2.5 (ref 0.8–1.2)
PROTHROMBIN TIME: 26.6 SEC (ref 9–12.5)

## 2020-08-20 PROCEDURE — 85610 PROTHROMBIN TIME: CPT | Mod: PO

## 2020-08-20 PROCEDURE — 36415 COLL VENOUS BLD VENIPUNCTURE: CPT | Mod: PO

## 2020-08-21 ENCOUNTER — ANTI-COAG VISIT (OUTPATIENT)
Dept: CARDIOLOGY | Facility: CLINIC | Age: 75
End: 2020-08-21
Payer: COMMERCIAL

## 2020-08-21 DIAGNOSIS — Z79.01 LONG TERM (CURRENT) USE OF ANTICOAGULANTS: ICD-10-CM

## 2020-08-21 DIAGNOSIS — I48.91 ATRIAL FIBRILLATION WITH RVR: ICD-10-CM

## 2020-08-21 PROCEDURE — 93793 ANTICOAG MGMT PT WARFARIN: CPT | Mod: S$GLB,,,

## 2020-08-21 PROCEDURE — 93793 PR ANTICOAGULANT MGMT FOR PT TAKING WARFARIN: ICD-10-PCS | Mod: S$GLB,,,

## 2020-09-21 ENCOUNTER — LAB VISIT (OUTPATIENT)
Dept: LAB | Facility: HOSPITAL | Age: 75
End: 2020-09-21
Attending: INTERNAL MEDICINE
Payer: COMMERCIAL

## 2020-09-21 DIAGNOSIS — Z79.01 LONG TERM (CURRENT) USE OF ANTICOAGULANTS: ICD-10-CM

## 2020-09-21 DIAGNOSIS — I48.91 ATRIAL FIBRILLATION WITH RVR: ICD-10-CM

## 2020-09-21 LAB
INR PPP: 2.6 (ref 0.8–1.2)
PROTHROMBIN TIME: 27.3 SEC (ref 9–12.5)

## 2020-09-21 PROCEDURE — 85610 PROTHROMBIN TIME: CPT | Mod: PO

## 2020-09-21 PROCEDURE — 36415 COLL VENOUS BLD VENIPUNCTURE: CPT | Mod: PO

## 2020-09-22 ENCOUNTER — ANTI-COAG VISIT (OUTPATIENT)
Dept: CARDIOLOGY | Facility: CLINIC | Age: 75
End: 2020-09-22
Payer: COMMERCIAL

## 2020-09-22 DIAGNOSIS — I48.91 ATRIAL FIBRILLATION WITH RVR: ICD-10-CM

## 2020-09-22 DIAGNOSIS — Z79.01 LONG TERM (CURRENT) USE OF ANTICOAGULANTS: ICD-10-CM

## 2020-09-22 PROCEDURE — 93793 ANTICOAG MGMT PT WARFARIN: CPT | Mod: S$GLB,,,

## 2020-09-22 PROCEDURE — 93793 PR ANTICOAGULANT MGMT FOR PT TAKING WARFARIN: ICD-10-PCS | Mod: S$GLB,,,

## 2020-09-30 ENCOUNTER — TELEPHONE (OUTPATIENT)
Dept: INTERNAL MEDICINE | Facility: CLINIC | Age: 75
End: 2020-09-30

## 2020-09-30 NOTE — TELEPHONE ENCOUNTER
The above pt wants to know if she need to take her flu and pneumonia shot, checked pt's chart and noticed that she had her flu vaccine on 02/27/20 and did not see a pneumonia shot documented, please advise,    Thanks

## 2020-09-30 NOTE — TELEPHONE ENCOUNTER
----- Message from Mora Mendez sent at 9/30/2020 11:36 AM CDT -----  Regarding: advice  Contact: [phone]/self  Type:  Needs Medical Advice    Who Called:Patient is requesting a call back. Does she need a flu shot and pneumonia shot.     Would the patient rather a call back or a response via [facility]?  call  Best Call Back Number:  [phone]/self  Additional Information:

## 2020-10-05 ENCOUNTER — TELEPHONE (OUTPATIENT)
Dept: FAMILY MEDICINE | Facility: CLINIC | Age: 75
End: 2020-10-05

## 2020-10-05 NOTE — TELEPHONE ENCOUNTER
----- Message from Anastasia Gutierrez sent at 10/5/2020  9:46 AM CDT -----  Contact: SELF [phone]  Patient would like to speak with you about what is the name of the shot she got Please advise

## 2020-10-05 NOTE — TELEPHONE ENCOUNTER
Returned call back to pt,   Pt stated that she will not take the flu vaccine anymore, she will only take the pneumonia next week when she comes in for her apt

## 2020-10-12 ENCOUNTER — TELEPHONE (OUTPATIENT)
Dept: INTERNAL MEDICINE | Facility: CLINIC | Age: 75
End: 2020-10-12

## 2020-10-12 NOTE — TELEPHONE ENCOUNTER
----- Message from Priscilla Hickman sent at 10/12/2020  9:50 AM CDT -----  Type:  Needs Medical Advice    Who Called:patient  Would the patient rather a call back or a response via [facility]? Call  Best Call Back Number: [phone]  Additional Information: She has an appt tomorrow; and will need a wheel chair to help her get from the car to the building. What does she need to do.

## 2020-10-13 ENCOUNTER — TELEPHONE (OUTPATIENT)
Dept: INTERNAL MEDICINE | Facility: CLINIC | Age: 75
End: 2020-10-13

## 2020-10-13 DIAGNOSIS — E11.22 TYPE 2 DIABETES MELLITUS WITH STAGE 4 CHRONIC KIDNEY DISEASE, WITH LONG-TERM CURRENT USE OF INSULIN: Primary | ICD-10-CM

## 2020-10-13 DIAGNOSIS — Z79.4 TYPE 2 DIABETES MELLITUS WITH STAGE 4 CHRONIC KIDNEY DISEASE, WITH LONG-TERM CURRENT USE OF INSULIN: Primary | ICD-10-CM

## 2020-10-13 DIAGNOSIS — N18.4 TYPE 2 DIABETES MELLITUS WITH STAGE 4 CHRONIC KIDNEY DISEASE, WITH LONG-TERM CURRENT USE OF INSULIN: Primary | ICD-10-CM

## 2020-10-13 RX ORDER — INSULIN PUMP SYRINGE, 3 ML
EACH MISCELLANEOUS
Qty: 180 EACH | Refills: 3 | Status: SHIPPED | OUTPATIENT
Start: 2020-10-13

## 2020-10-13 RX ORDER — LANCETS
EACH MISCELLANEOUS
Qty: 180 EACH | Refills: 3 | Status: SHIPPED | OUTPATIENT
Start: 2020-10-13 | End: 2021-08-16 | Stop reason: SDUPTHER

## 2020-10-13 NOTE — TELEPHONE ENCOUNTER
----- Message from Dasha Robb sent at 10/13/2020 12:03 PM CDT -----  Regarding: Refills  Contact: [phone]  Patient is calling to get refills on her medication sent to [facility] [phone] Fax [phone]    1. Diabetes supplies test stripes needles, lancets and meter Accu Check Carmina plus     2. BD alcohol swabs

## 2020-10-15 ENCOUNTER — TELEPHONE (OUTPATIENT)
Dept: INTERNAL MEDICINE | Facility: CLINIC | Age: 75
End: 2020-10-15

## 2020-10-15 NOTE — TELEPHONE ENCOUNTER
----- Message from Arabella Galvez sent at 10/15/2020  4:23 PM CDT -----  Contact: [phone]/ PATIENT  Patient called stating she will be running 15 minutes late for her appointment. Tried IM message to [facility] no answer.  Appointment notes was updated

## 2020-10-15 NOTE — TELEPHONE ENCOUNTER
Returned call back to pt, message was sent from call center @ 4:23 pm, pt's apt was at 4:15, pt stated when I called her @ 4:27 that she was going to be running late another 10 minutes, informed pt she needed to reschedule her apt, pt stated she will call tomorrow to see when she can come.

## 2020-10-22 ENCOUNTER — CLINICAL SUPPORT (OUTPATIENT)
Dept: INTERNAL MEDICINE | Facility: CLINIC | Age: 75
End: 2020-10-22
Payer: COMMERCIAL

## 2020-10-22 DIAGNOSIS — Z23 NEED FOR PROPHYLACTIC VACCINATION WITH STREPTOCOCCUS PNEUMONIAE (PNEUMOCOCCUS) AND INFLUENZA VACCINES: Primary | ICD-10-CM

## 2020-10-22 PROCEDURE — 90471 IMMUNIZATION ADMIN: CPT | Mod: S$GLB,,, | Performed by: INTERNAL MEDICINE

## 2020-10-22 PROCEDURE — 90732 PNEUMOCOCCAL POLYSACCHARIDE VACCINE 23-VALENT =>2YO SQ IM: ICD-10-PCS | Mod: S$GLB,,, | Performed by: INTERNAL MEDICINE

## 2020-10-22 PROCEDURE — 90732 PPSV23 VACC 2 YRS+ SUBQ/IM: CPT | Mod: S$GLB,,, | Performed by: INTERNAL MEDICINE

## 2020-10-22 PROCEDURE — 90471 PNEUMOCOCCAL POLYSACCHARIDE VACCINE 23-VALENT =>2YO SQ IM: ICD-10-PCS | Mod: S$GLB,,, | Performed by: INTERNAL MEDICINE

## 2020-10-22 NOTE — PROGRESS NOTES
Patient arrived to clinic for pneumonia vaccine.  Administered to left deltoid.  Patient tolerated.  Band-aid applied.

## 2020-10-26 ENCOUNTER — TELEPHONE (OUTPATIENT)
Dept: CARDIOLOGY | Facility: CLINIC | Age: 75
End: 2020-10-26

## 2020-10-26 NOTE — TELEPHONE ENCOUNTER
"Called the pt back in regards to this message.     Pt wanted to know if Dr. Patrick will also be her primary care doctor and if she can treat her diabetes and osteoporosis.     Informed the pt Dr. Patrick will focus mainly on her heart conditions as her cardiologist.     Pt stated she is "pissed off at Dr. Myaa at the moment and would like a new primary care doctor."  Pt stated she will call her insurance to find a doctor that can be her new primary care doctor.     Will call back to schedule an appointment if needed.     ND  "

## 2020-11-03 ENCOUNTER — ANTI-COAG VISIT (OUTPATIENT)
Dept: CARDIOLOGY | Facility: CLINIC | Age: 75
End: 2020-11-03
Payer: COMMERCIAL

## 2020-11-03 ENCOUNTER — LAB VISIT (OUTPATIENT)
Dept: LAB | Facility: HOSPITAL | Age: 75
End: 2020-11-03
Attending: INTERNAL MEDICINE
Payer: COMMERCIAL

## 2020-11-03 DIAGNOSIS — Z79.01 LONG TERM (CURRENT) USE OF ANTICOAGULANTS: ICD-10-CM

## 2020-11-03 DIAGNOSIS — I48.91 ATRIAL FIBRILLATION WITH RVR: ICD-10-CM

## 2020-11-03 LAB
INR PPP: 3.1 (ref 0.8–1.2)
PROTHROMBIN TIME: 32.4 SEC (ref 9–12.5)

## 2020-11-03 PROCEDURE — 93793 ANTICOAG MGMT PT WARFARIN: CPT | Mod: S$GLB,,,

## 2020-11-03 PROCEDURE — 36415 COLL VENOUS BLD VENIPUNCTURE: CPT | Mod: PO

## 2020-11-03 PROCEDURE — 85610 PROTHROMBIN TIME: CPT | Mod: PO

## 2020-11-03 PROCEDURE — 93793 PR ANTICOAGULANT MGMT FOR PT TAKING WARFARIN: ICD-10-PCS | Mod: S$GLB,,,

## 2020-11-06 ENCOUNTER — LAB VISIT (OUTPATIENT)
Dept: LAB | Facility: HOSPITAL | Age: 75
End: 2020-11-06
Attending: UROLOGY
Payer: COMMERCIAL

## 2020-11-06 DIAGNOSIS — C64.9 RENAL CELL CARCINOMA: Primary | ICD-10-CM

## 2020-11-06 LAB
ALBUMIN SERPL BCP-MCNC: 4 G/DL (ref 3.5–5.2)
ALP SERPL-CCNC: 201 U/L (ref 38–126)
ALT SERPL W/O P-5'-P-CCNC: 20 U/L (ref 10–44)
ANION GAP SERPL CALC-SCNC: 4 MMOL/L (ref 8–16)
AST SERPL-CCNC: 28 U/L (ref 15–46)
BASOPHILS # BLD AUTO: 0.06 K/UL (ref 0–0.2)
BASOPHILS NFR BLD: 0.9 % (ref 0–1.9)
BILIRUB SERPL-MCNC: 0.6 MG/DL (ref 0.1–1)
BUN SERPL-MCNC: 41 MG/DL (ref 7–17)
CALCIUM SERPL-MCNC: 9 MG/DL (ref 8.7–10.5)
CHLORIDE SERPL-SCNC: 103 MMOL/L (ref 95–110)
CO2 SERPL-SCNC: 33 MMOL/L (ref 23–29)
CREAT SERPL-MCNC: 1.95 MG/DL (ref 0.5–1.4)
DIFFERENTIAL METHOD: ABNORMAL
EOSINOPHIL # BLD AUTO: 0.2 K/UL (ref 0–0.5)
EOSINOPHIL NFR BLD: 2.2 % (ref 0–8)
ERYTHROCYTE [DISTWIDTH] IN BLOOD BY AUTOMATED COUNT: 12.6 % (ref 11.5–14.5)
EST. GFR  (AFRICAN AMERICAN): 28.2 ML/MIN/1.73 M^2
EST. GFR  (NON AFRICAN AMERICAN): 24.5 ML/MIN/1.73 M^2
GLUCOSE SERPL-MCNC: 207 MG/DL (ref 70–110)
HCT VFR BLD AUTO: 45.2 % (ref 37–48.5)
HGB BLD-MCNC: 14.4 G/DL (ref 12–16)
IMM GRANULOCYTES # BLD AUTO: 0.01 K/UL (ref 0–0.04)
IMM GRANULOCYTES NFR BLD AUTO: 0.1 % (ref 0–0.5)
LYMPHOCYTES # BLD AUTO: 2.2 K/UL (ref 1–4.8)
LYMPHOCYTES NFR BLD: 33.4 % (ref 18–48)
MCH RBC QN AUTO: 32.2 PG (ref 27–31)
MCHC RBC AUTO-ENTMCNC: 31.9 G/DL (ref 32–36)
MCV RBC AUTO: 101 FL (ref 82–98)
MONOCYTES # BLD AUTO: 0.6 K/UL (ref 0.3–1)
MONOCYTES NFR BLD: 8.8 % (ref 4–15)
NEUTROPHILS # BLD AUTO: 3.7 K/UL (ref 1.8–7.7)
NEUTROPHILS NFR BLD: 54.6 % (ref 38–73)
NRBC BLD-RTO: 0 /100 WBC
PLATELET # BLD AUTO: 225 K/UL (ref 150–350)
PMV BLD AUTO: 9.4 FL (ref 9.2–12.9)
POTASSIUM SERPL-SCNC: 4.3 MMOL/L (ref 3.5–5.1)
PROT SERPL-MCNC: 7.6 G/DL (ref 6–8.4)
RBC # BLD AUTO: 4.47 M/UL (ref 4–5.4)
SODIUM SERPL-SCNC: 140 MMOL/L (ref 136–145)
WBC # BLD AUTO: 6.71 K/UL (ref 3.9–12.7)

## 2020-11-06 PROCEDURE — 36415 COLL VENOUS BLD VENIPUNCTURE: CPT | Mod: PO

## 2020-11-06 PROCEDURE — 80053 COMPREHEN METABOLIC PANEL: CPT | Mod: PO

## 2020-11-06 PROCEDURE — 85025 COMPLETE CBC W/AUTO DIFF WBC: CPT | Mod: PO

## 2020-11-11 ENCOUNTER — OFFICE VISIT (OUTPATIENT)
Dept: FAMILY MEDICINE | Facility: CLINIC | Age: 75
End: 2020-11-11
Payer: COMMERCIAL

## 2020-11-11 VITALS
OXYGEN SATURATION: 95 % | HEART RATE: 91 BPM | BODY MASS INDEX: 33.98 KG/M2 | WEIGHT: 199.06 LBS | DIASTOLIC BLOOD PRESSURE: 62 MMHG | HEIGHT: 64 IN | SYSTOLIC BLOOD PRESSURE: 102 MMHG | TEMPERATURE: 97 F

## 2020-11-11 DIAGNOSIS — Z85.528 HISTORY OF RENAL CELL CARCINOMA: ICD-10-CM

## 2020-11-11 DIAGNOSIS — Z12.89 ENCOUNTER FOR SCREENING FOR MALIGNANT NEOPLASM OF OTHER SITES: ICD-10-CM

## 2020-11-11 DIAGNOSIS — E55.9 VITAMIN D DEFICIENCY: ICD-10-CM

## 2020-11-11 DIAGNOSIS — Z90.5 H/O RIGHT NEPHRECTOMY: ICD-10-CM

## 2020-11-11 DIAGNOSIS — R10.31 RIGHT LOWER QUADRANT ABDOMINAL PAIN: Primary | ICD-10-CM

## 2020-11-11 DIAGNOSIS — M54.31 SCIATICA, RIGHT SIDE: ICD-10-CM

## 2020-11-11 DIAGNOSIS — E11.9 DIABETES MELLITUS WITHOUT COMPLICATION: ICD-10-CM

## 2020-11-11 DIAGNOSIS — Z79.4 ENCOUNTER FOR LONG-TERM (CURRENT) INSULIN USE: ICD-10-CM

## 2020-11-11 DIAGNOSIS — Z23 NEED FOR INFLUENZA VACCINATION: ICD-10-CM

## 2020-11-11 DIAGNOSIS — I48.0 PAROXYSMAL ATRIAL FIBRILLATION: ICD-10-CM

## 2020-11-11 DIAGNOSIS — M81.0 OSTEOPOROSIS, UNSPECIFIED OSTEOPOROSIS TYPE, UNSPECIFIED PATHOLOGICAL FRACTURE PRESENCE: ICD-10-CM

## 2020-11-11 DIAGNOSIS — Z79.01 LONG TERM (CURRENT) USE OF ANTICOAGULANTS: ICD-10-CM

## 2020-11-11 PROCEDURE — 1125F PR PAIN SEVERITY QUANTIFIED, PAIN PRESENT: ICD-10-PCS | Mod: S$GLB,,, | Performed by: STUDENT IN AN ORGANIZED HEALTH CARE EDUCATION/TRAINING PROGRAM

## 2020-11-11 PROCEDURE — 1125F AMNT PAIN NOTED PAIN PRSNT: CPT | Mod: S$GLB,,, | Performed by: STUDENT IN AN ORGANIZED HEALTH CARE EDUCATION/TRAINING PROGRAM

## 2020-11-11 PROCEDURE — 1101F PR PT FALLS ASSESS DOC 0-1 FALLS W/OUT INJ PAST YR: ICD-10-PCS | Mod: CPTII,S$GLB,, | Performed by: STUDENT IN AN ORGANIZED HEALTH CARE EDUCATION/TRAINING PROGRAM

## 2020-11-11 PROCEDURE — 1159F MED LIST DOCD IN RCRD: CPT | Mod: S$GLB,,, | Performed by: STUDENT IN AN ORGANIZED HEALTH CARE EDUCATION/TRAINING PROGRAM

## 2020-11-11 PROCEDURE — 3288F PR FALLS RISK ASSESSMENT DOCUMENTED: ICD-10-PCS | Mod: CPTII,S$GLB,, | Performed by: STUDENT IN AN ORGANIZED HEALTH CARE EDUCATION/TRAINING PROGRAM

## 2020-11-11 PROCEDURE — 3051F HG A1C>EQUAL 7.0%<8.0%: CPT | Mod: CPTII,S$GLB,, | Performed by: STUDENT IN AN ORGANIZED HEALTH CARE EDUCATION/TRAINING PROGRAM

## 2020-11-11 PROCEDURE — 3051F PR MOST RECENT HEMOGLOBIN A1C LEVEL 7.0 - < 8.0%: ICD-10-PCS | Mod: CPTII,S$GLB,, | Performed by: STUDENT IN AN ORGANIZED HEALTH CARE EDUCATION/TRAINING PROGRAM

## 2020-11-11 PROCEDURE — 1101F PT FALLS ASSESS-DOCD LE1/YR: CPT | Mod: CPTII,S$GLB,, | Performed by: STUDENT IN AN ORGANIZED HEALTH CARE EDUCATION/TRAINING PROGRAM

## 2020-11-11 PROCEDURE — 1159F PR MEDICATION LIST DOCUMENTED IN MEDICAL RECORD: ICD-10-PCS | Mod: S$GLB,,, | Performed by: STUDENT IN AN ORGANIZED HEALTH CARE EDUCATION/TRAINING PROGRAM

## 2020-11-11 PROCEDURE — 3288F FALL RISK ASSESSMENT DOCD: CPT | Mod: CPTII,S$GLB,, | Performed by: STUDENT IN AN ORGANIZED HEALTH CARE EDUCATION/TRAINING PROGRAM

## 2020-11-11 PROCEDURE — 99214 PR OFFICE/OUTPT VISIT, EST, LEVL IV, 30-39 MIN: ICD-10-PCS | Mod: S$GLB,,, | Performed by: STUDENT IN AN ORGANIZED HEALTH CARE EDUCATION/TRAINING PROGRAM

## 2020-11-11 PROCEDURE — 99214 OFFICE O/P EST MOD 30 MIN: CPT | Mod: S$GLB,,, | Performed by: STUDENT IN AN ORGANIZED HEALTH CARE EDUCATION/TRAINING PROGRAM

## 2020-11-11 RX ORDER — GABAPENTIN 100 MG/1
100 CAPSULE ORAL 3 TIMES DAILY PRN
Qty: 90 CAPSULE | Refills: 3 | Status: SHIPPED | OUTPATIENT
Start: 2020-11-11 | End: 2020-12-10 | Stop reason: SDUPTHER

## 2020-11-11 RX ORDER — DICYCLOMINE HYDROCHLORIDE 20 MG/1
20 TABLET ORAL EVERY 6 HOURS
Qty: 120 TABLET | Refills: 0 | Status: SHIPPED | OUTPATIENT
Start: 2020-11-11 | End: 2020-12-19 | Stop reason: SDUPTHER

## 2020-11-11 RX ORDER — BLOOD SUGAR DIAGNOSTIC
STRIP MISCELLANEOUS
COMMUNITY
Start: 2020-10-13 | End: 2021-02-24

## 2020-11-11 RX ORDER — LATANOPROST 50 UG/ML
SOLUTION/ DROPS OPHTHALMIC
COMMUNITY
Start: 2020-10-25

## 2020-11-11 NOTE — PATIENT INSTRUCTIONS

## 2020-11-12 ENCOUNTER — LAB VISIT (OUTPATIENT)
Dept: LAB | Facility: HOSPITAL | Age: 75
End: 2020-11-12
Attending: UROLOGY
Payer: COMMERCIAL

## 2020-11-12 DIAGNOSIS — Z79.4 ENCOUNTER FOR LONG-TERM (CURRENT) INSULIN USE: ICD-10-CM

## 2020-11-12 DIAGNOSIS — E11.9 DIABETES MELLITUS WITHOUT COMPLICATION: ICD-10-CM

## 2020-11-12 DIAGNOSIS — M81.0 OSTEOPOROSIS, UNSPECIFIED OSTEOPOROSIS TYPE, UNSPECIFIED PATHOLOGICAL FRACTURE PRESENCE: ICD-10-CM

## 2020-11-12 DIAGNOSIS — R74.8 ALKALINE PHOSPHATASE ELEVATION: Primary | ICD-10-CM

## 2020-11-12 DIAGNOSIS — C64.9 RENAL CELL CARCINOMA: ICD-10-CM

## 2020-11-12 LAB
25(OH)D3+25(OH)D2 SERPL-MCNC: 40 NG/ML (ref 30–96)
ALBUMIN SERPL BCP-MCNC: 4.2 G/DL (ref 3.5–5.2)
ALP SERPL-CCNC: 181 U/L (ref 38–126)
ALT SERPL W/O P-5'-P-CCNC: 23 U/L (ref 10–44)
ANION GAP SERPL CALC-SCNC: 6 MMOL/L (ref 8–16)
AST SERPL-CCNC: 32 U/L (ref 15–46)
BASOPHILS # BLD AUTO: 0.06 K/UL (ref 0–0.2)
BASOPHILS NFR BLD: 0.8 % (ref 0–1.9)
BILIRUB SERPL-MCNC: 0.6 MG/DL (ref 0.1–1)
BUN SERPL-MCNC: 46 MG/DL (ref 7–17)
CALCIUM SERPL-MCNC: 9.1 MG/DL (ref 8.7–10.5)
CHLORIDE SERPL-SCNC: 102 MMOL/L (ref 95–110)
CO2 SERPL-SCNC: 33 MMOL/L (ref 23–29)
CREAT SERPL-MCNC: 2.39 MG/DL (ref 0.5–1.4)
DIFFERENTIAL METHOD: ABNORMAL
EOSINOPHIL # BLD AUTO: 0.2 K/UL (ref 0–0.5)
EOSINOPHIL NFR BLD: 2.5 % (ref 0–8)
ERYTHROCYTE [DISTWIDTH] IN BLOOD BY AUTOMATED COUNT: 12.7 % (ref 11.5–14.5)
EST. GFR  (AFRICAN AMERICAN): 22.1 ML/MIN/1.73 M^2
EST. GFR  (NON AFRICAN AMERICAN): 19.1 ML/MIN/1.73 M^2
ESTIMATED AVG GLUCOSE: 183 MG/DL (ref 68–131)
GGT SERPL-CCNC: 112 U/L (ref 8–55)
GLUCOSE SERPL-MCNC: 159 MG/DL (ref 70–110)
HBA1C MFR BLD HPLC: 8 % (ref 4–5.6)
HCT VFR BLD AUTO: 45.8 % (ref 37–48.5)
HGB BLD-MCNC: 14.7 G/DL (ref 12–16)
IMM GRANULOCYTES # BLD AUTO: 0.02 K/UL (ref 0–0.04)
IMM GRANULOCYTES NFR BLD AUTO: 0.3 % (ref 0–0.5)
LDH SERPL L TO P-CCNC: 309 U/L (ref 110–260)
LYMPHOCYTES # BLD AUTO: 2 K/UL (ref 1–4.8)
LYMPHOCYTES NFR BLD: 26.1 % (ref 18–48)
MCH RBC QN AUTO: 32.3 PG (ref 27–31)
MCHC RBC AUTO-ENTMCNC: 32.1 G/DL (ref 32–36)
MCV RBC AUTO: 101 FL (ref 82–98)
MONOCYTES # BLD AUTO: 0.6 K/UL (ref 0.3–1)
MONOCYTES NFR BLD: 8.3 % (ref 4–15)
NEUTROPHILS # BLD AUTO: 4.7 K/UL (ref 1.8–7.7)
NEUTROPHILS NFR BLD: 62 % (ref 38–73)
NRBC BLD-RTO: 0 /100 WBC
PLATELET # BLD AUTO: 217 K/UL (ref 150–350)
PMV BLD AUTO: 9.4 FL (ref 9.2–12.9)
POTASSIUM SERPL-SCNC: 4.6 MMOL/L (ref 3.5–5.1)
PROT SERPL-MCNC: 7.9 G/DL (ref 6–8.4)
PTH-INTACT SERPL-MCNC: 237 PG/ML (ref 9–77)
RBC # BLD AUTO: 4.55 M/UL (ref 4–5.4)
SODIUM SERPL-SCNC: 141 MMOL/L (ref 136–145)
T4 FREE SERPL-MCNC: 1.14 NG/DL (ref 0.71–1.51)
TSH SERPL DL<=0.005 MIU/L-ACNC: 1.4 UIU/ML (ref 0.4–4)
WBC # BLD AUTO: 7.6 K/UL (ref 3.9–12.7)

## 2020-11-12 PROCEDURE — 82306 VITAMIN D 25 HYDROXY: CPT | Mod: PO

## 2020-11-12 PROCEDURE — 83970 ASSAY OF PARATHORMONE: CPT | Mod: PO

## 2020-11-12 PROCEDURE — 80053 COMPREHEN METABOLIC PANEL: CPT | Mod: PO

## 2020-11-12 PROCEDURE — 90471 IMMUNIZATION ADMIN: CPT | Mod: S$GLB,,, | Performed by: STUDENT IN AN ORGANIZED HEALTH CARE EDUCATION/TRAINING PROGRAM

## 2020-11-12 PROCEDURE — 90471 FLU VACCINE - QUADRIVALENT - ADJUVANTED: ICD-10-PCS | Mod: S$GLB,,, | Performed by: STUDENT IN AN ORGANIZED HEALTH CARE EDUCATION/TRAINING PROGRAM

## 2020-11-12 PROCEDURE — 83036 HEMOGLOBIN GLYCOSYLATED A1C: CPT

## 2020-11-12 PROCEDURE — 85025 COMPLETE CBC W/AUTO DIFF WBC: CPT | Mod: PO

## 2020-11-12 PROCEDURE — 90694 FLU VACCINE - QUADRIVALENT - ADJUVANTED: ICD-10-PCS | Mod: S$GLB,,, | Performed by: STUDENT IN AN ORGANIZED HEALTH CARE EDUCATION/TRAINING PROGRAM

## 2020-11-12 PROCEDURE — 84439 ASSAY OF FREE THYROXINE: CPT

## 2020-11-12 PROCEDURE — 36415 COLL VENOUS BLD VENIPUNCTURE: CPT | Mod: PO

## 2020-11-12 PROCEDURE — 82977 ASSAY OF GGT: CPT

## 2020-11-12 PROCEDURE — 84443 ASSAY THYROID STIM HORMONE: CPT | Mod: PO

## 2020-11-12 PROCEDURE — 83615 LACTATE (LD) (LDH) ENZYME: CPT

## 2020-11-12 PROCEDURE — 90694 VACC AIIV4 NO PRSRV 0.5ML IM: CPT | Mod: S$GLB,,, | Performed by: STUDENT IN AN ORGANIZED HEALTH CARE EDUCATION/TRAINING PROGRAM

## 2020-11-12 NOTE — PROGRESS NOTES
Patient ID: Yajaira Terry is a 75 y.o. female. This patient is new to me.    Chief Complaint: Annual Exam, Osteoporosis, pain when walking, right side pain, and Constipation    Constipation  This is a chronic problem. The current episode started more than 1 year ago. The problem is unchanged. Her stool frequency is 2 to 3 times per week. The stool is described as firm. The patient is not on a high fiber diet. She does not exercise regularly. There has not been adequate water intake. Associated symptoms include abdominal pain and bloating. Pertinent negatives include no back pain, diarrhea, difficulty urinating, fecal incontinence, fever, hematochezia, melena, nausea, rectal pain, vomiting or weight loss. Risk factors include obesity. She has tried stool softeners for the symptoms. The treatment provided mild relief. There is no history of inflammatory bowel disease, irritable bowel syndrome, metabolic disease, neurologic disease or neuromuscular disease. (Right nephrectomy approx 6 months ago)   Abdominal Pain  This is a new problem. The current episode started in the past 7 days. The onset quality is sudden. The problem occurs constantly. The problem has been waxing and waning. The pain is located in the RLQ. The pain is moderate. The quality of the pain is cramping. The abdominal pain does not radiate. Associated symptoms include constipation. Pertinent negatives include no diarrhea, fever, frequency, headaches, hematochezia, melena, myalgias, nausea, vomiting or weight loss. Nothing aggravates the pain. The pain is relieved by nothing. She has tried nothing for the symptoms. There is no history of irritable bowel syndrome.     Patient also complains of pain that radiates from her lower back and down her buttock. She has never had pain like this before. She describes it as shooting.     Past Medical History:   Diagnosis Date    Atrial fibrillation     CHF (congestive heart failure)     Coronary artery  disease     Diabetes mellitus     Dyslipidemia     Osteoporosis, unspecified     Tobacco use     Urinary tract infection     Vaginal infection        Past Surgical History:   Procedure Laterality Date    APPENDECTOMY       SECTION          HYSTERECTOMY       - partial    INCONTINENCE SURGERY      KNEE SURGERY      LAPAROSCOPIC ROBOT-ASSISTED SURGICAL REMOVAL OF KIDNEY USING DA RAYMON XI Right        Family History   Problem Relation Age of Onset    Kidney disease Maternal Grandmother     Breast cancer Sister     Breast cancer Cousin        Social History     Tobacco Use    Smoking status: Former Smoker     Quit date: 2017     Years since quitting: 3.4    Smokeless tobacco: Never Used   Substance Use Topics    Alcohol use: No    Drug use: No       Review of patient's allergies indicates:   Allergen Reactions    Sulfa (sulfonamide antibiotics)         Review of Systems  Review of Systems   Constitutional: Negative for fever and weight loss.   HENT: Negative for ear pain and sinus pain.    Eyes: Negative for discharge.   Respiratory: Negative for cough and shortness of breath.    Cardiovascular: Negative for chest pain and leg swelling.   Gastrointestinal: Positive for abdominal pain, bloating and constipation. Negative for diarrhea, hematochezia, melena, nausea, rectal pain and vomiting.   Genitourinary: Negative for difficulty urinating, frequency and urgency.   Musculoskeletal: Negative for back pain and myalgias.   Skin: Negative for rash.   Neurological: Negative for weakness and headaches.   Psychiatric/Behavioral: Negative for depression.   All other systems reviewed and are negative.      Currently Medications  Current Outpatient Medications on File Prior to Visit   Medication Sig Dispense Refill    ACCU-CHEK GUIDE TEST STRIPS Strp TO CHECK BG 2 TIMES DAILY      alendronate (FOSAMAX) 35 MG tablet Take 1 tablet (35 mg total) by mouth every 7 days. 12 tablet 4     amiodarone (PACERONE) 200 MG Tab Take 1 tablet (200 mg total) by mouth once daily. PATIENT NEEDS A FOLLOW UP APPOINTMENT FOR FUTURE REFILLS. 30 tablet 5    blood-glucose meter kit To check BG 2 times daily, to use with insurance preferred meter 180 each 3    CALCIUM & MAGNESIUM CARBONATES ORAL Take by mouth.      COMBIGAN 0.2-0.5 % Drop Place 1 drop into both eyes 2 (two) times daily.      diclofenac sodium (VOLTAREN) 1 % Gel Apply 2 g topically every 6 (six) hours as needed. For pain. 100 g 1    docusate sodium (COLACE) 100 MG capsule Take 100 mg by mouth 2 (two) times daily.      fluticasone propionate (FLONASE) 50 mcg/actuation nasal spray 1 spray (50 mcg total) by Each Nostril route once daily. 15.8 mL 0    furosemide (LASIX) 20 MG tablet TAKE 1 TABLET BY MOUTH TWICE A  tablet 0    INSULIN ASPART (NOVOLOG SUBQ) Inject into the skin.      insulin glargine (LANTUS U-100 INSULIN) 100 unit/mL injection 55 units  Daily. 3 vial 4    lancets Misc To check BG 2 times daily, to use with insurance preferred meter 180 each 3    LANTUS SOLOSTAR U-100 INSULIN glargine 100 units/mL (3mL) SubQ pen INJECT 55 UNITS UNDER THE SKIN DAILY 30 Syringe 4    latanoprost 0.005 % ophthalmic solution PLACE 1 DROP INTO LEFT EYE AT BEDTIME      metoprolol tartrate (LOPRESSOR) 50 MG tablet Take 2 tablets (100 mg total) by mouth 2 (two) times daily. 360 tablet 3    phenazopyridine (PYRIDIUM) 200 MG tablet Take 1 tablet (200 mg total) by mouth 3 (three) times daily. 6 tablet 0    pravastatin (PRAVACHOL) 40 MG tablet Take 1 tablet (40 mg total) by mouth every evening. 90 tablet 3    propafenone (RHTHYMOL) 150 MG Tab Take 150 mg by mouth 3 (three) times daily.      tramadol 50 mg TbDL Take 50 mg by mouth as needed.      warfarin (COUMADIN) 3 MG tablet TAKE ONE TABLET BY MOUTH DAILY ALTERNATING WITH A HALF OF TABLET 90 tablet 4    [DISCONTINUED] furosemide (LASIX) 20 MG tablet Take 20 mg by mouth 2 (two) times daily.    "   [DISCONTINUED] furosemide (LASIX) 20 MG tablet TAKE 1 TABLET BY MOUTH TWICE A  tablet 0    [DISCONTINUED] LANTUS SOLOSTAR U-100 INSULIN glargine 100 units/mL (3mL) SubQ pen INJECT 55 UNITS UNDER THE SKIN DAILY       No current facility-administered medications on file prior to visit.        Physical  Exam  Vitals:    11/11/20 1652   BP: 102/62   BP Location: Left arm   Patient Position: Sitting   Pulse: 91   Temp: 97.3 °F (36.3 °C)   TempSrc: Oral   SpO2: 95%   Weight: 90.3 kg (199 lb 1.2 oz)   Height: 5' 4" (1.626 m)      Physical Exam  Vitals signs and nursing note reviewed.   Constitutional:       General: She is not in acute distress.     Appearance: She is obese. She is not ill-appearing.   HENT:      Head: Normocephalic and atraumatic.      Right Ear: External ear normal.      Left Ear: External ear normal.      Nose: Nose normal.      Mouth/Throat:      Mouth: Mucous membranes are moist.   Eyes:      Extraocular Movements: Extraocular movements intact.      Conjunctiva/sclera: Conjunctivae normal.   Neck:      Musculoskeletal: Normal range of motion.   Cardiovascular:      Rate and Rhythm: Normal rate and regular rhythm.      Pulses: Normal pulses.      Heart sounds: No murmur.   Pulmonary:      Effort: Pulmonary effort is normal. No respiratory distress.      Breath sounds: No wheezing.   Abdominal:      General: There is no distension.      Palpations: Abdomen is soft. There is no mass.      Tenderness: There is abdominal tenderness in the right lower quadrant. There is no guarding or rebound.   Musculoskeletal:         General: No swelling.      Lumbar back: She exhibits normal range of motion, no tenderness and no swelling.   Skin:     Coloration: Skin is not jaundiced.      Findings: No rash.   Neurological:      General: No focal deficit present.      Mental Status: She is alert and oriented to person, place, and time.   Psychiatric:         Mood and Affect: Mood normal.         Thought " Content: Thought content normal.         Labs:    Complete Blood Count  Lab Results   Component Value Date    RBC 4.47 11/06/2020    HGB 14.4 11/06/2020    HCT 45.2 11/06/2020     (H) 11/06/2020    MCH 32.2 (H) 11/06/2020    MCHC 31.9 (L) 11/06/2020    RDW 12.6 11/06/2020     11/06/2020    MPV 9.4 11/06/2020    GRAN 3.7 11/06/2020    GRAN 54.6 11/06/2020    LYMPH 2.2 11/06/2020    LYMPH 33.4 11/06/2020    MONO 0.6 11/06/2020    MONO 8.8 11/06/2020    EOS 0.2 11/06/2020    BASO 0.06 11/06/2020    EOSINOPHIL 2.2 11/06/2020    BASOPHIL 0.9 11/06/2020    DIFFMETHOD Automated 11/06/2020       Comprehensive Metabolic Panel  Lab Results   Component Value Date     (H) 11/06/2020    BUN 41 (H) 11/06/2020    CREATININE 1.95 (H) 11/06/2020     11/06/2020    K 4.3 11/06/2020     11/06/2020    PROT 7.6 11/06/2020    ALBUMIN 4.0 11/06/2020    BILITOT 0.6 11/06/2020    AST 28 11/06/2020    ALKPHOS 201 (H) 11/06/2020    CO2 33 (H) 11/06/2020    ALT 20 11/06/2020    ANIONGAP 4 (L) 11/06/2020    EGFRNONAA 24.5 (A) 11/06/2020    ESTGFRAFRICA 28.2 (A) 11/06/2020       TSH  No results found for: TSH    Imaging:  CT Chest Abdomen Pelvis Without Contrast (XPD)  Narrative: EXAMINATION:  CT CHEST ABDOMEN PELVIS WITHOUT CONTRAST(XPD)    CLINICAL HISTORY:  Malignant neoplasm of unspecified kidney, except renal pelvisRENAL;    TECHNIQUE:  Standard abdomen and pelvis CT protocol without oral or 03/23/2020 IV contrast was performed    COMPARISON:  03/23/2020    FINDINGS:  Finding: The size of the heart is within normal limits.  There is no evidence of an acute pulmonary process.  There is a mild amount of scarring in the peripheral portion of both lungs.  There is no pneumothorax or pleural effusion.  There are findings characteristic of a hemangioma on the right side of the T8 vertebral body.    There are several stones in the dependent portion of the gallbladder.  One of the larger stones measures 9 mm.  There  is a 22 mm area of calcification in the hilum of the spleen.  Otherwise, the spleen is normal in appearance.  The liver, pancreas, adrenals, and left kidney are normal in appearance.  The right kidney is absent.  The left ureter and the urinary bladder are normal in appearance.  The uterus is absent.  The appendix is not visualized.  The rest of the gastrointestinal system is normal in appearance. There is no free fluid within the abdomen or pelvis. There is no pneumoperitoneum.  There is a mild amount of atherosclerosis.  There are several small hernias through the anterior wall of the pelvis.  The larger 1 has a mouth that measures 26 mm in width.  There is a healing moderate compression fracture of the L3 vertebral body.  There is grade 1 anterolisthesis of L5 on S1.  There is no abnormal lymphadenopathy based on CT size criteria.  Impression: 1. There is no abnormal lymphadenopathy based on CT size criteria.  The right kidney is absent.  2. There are several stones in the dependent portion of the gallbladder.  One of the larger stones measures 9 mm.  3. There is a healing moderate compression fracture of the L3 vertebral body.  4. There is grade 1 anterolisthesis of L5 on S1.  5. There are several small hernias through the anterior wall of the pelvis. The larger 1 has a mouth that measures 26 mm in width.  6. There are findings characteristic of a hemangioma on the right side of the T8 vertebral body.  All CT scans at this facility use dose modulation, iterative reconstruction, and/or weight base dosing when appropriate to reduce radiation dose when appropriate to reduce radiation dose to as low as reasonably achievable.    Electronically signed by: Barry Bauer MD  Date:    07/23/2020  Time:    17:23      Assessment/Plan:      ICD-10-CM ICD-9-CM   1. Right lower quadrant abdominal pain  R10.31 789.03   2. Osteoporosis, unspecified osteoporosis type, unspecified pathological fracture presence  M81.0 733.00    3. H/O right nephrectomy  Z90.5 V45.73   4. History of renal cell carcinoma  Z85.528 V10.52   5. Sciatica, right side  M54.31 724.3   6. Vitamin D deficiency  E55.9 268.9   7. Need for influenza vaccination  Z23 V04.81   8. Encounter for screening for malignant neoplasm of other sites  Z12.89 V76.49   9. Long term (current) use of anticoagulants  Z79.01 V58.61   10. Paroxysmal atrial fibrillation  I48.0 427.31     Right lower quadrant abdominal pain  -     dicyclomine (BENTYL) 20 mg tablet; Take 1 tablet (20 mg total) by mouth every 6 (six) hours.  Dispense: 120 tablet; Refill: 0  -     Cancel: CT Chest Abdomen Pelvis Without Contrast (XPD); Future; Expected date: 11/11/2020  -     CT Abdomen Without Contrast; Future; Expected date: 11/12/2020    Osteoporosis, unspecified osteoporosis type, unspecified pathological fracture presence  -     Vitamin D; Future; Expected date: 11/11/2020    H/O right nephrectomy  -     Cancel: CT Chest Abdomen Pelvis Without Contrast (XPD); Future; Expected date: 11/11/2020  -     CT Abdomen Without Contrast; Future; Expected date: 11/12/2020    History of renal cell carcinoma  -     Cancel: CT Chest Abdomen Pelvis Without Contrast (XPD); Future; Expected date: 11/11/2020  -     CT Abdomen Without Contrast; Future; Expected date: 11/12/2020    Sciatica, right side  -     gabapentin (NEURONTIN) 100 MG capsule; Take 1 capsule (100 mg total) by mouth 3 (three) times daily as needed.  Dispense: 90 capsule; Refill: 3    Vitamin D deficiency    Need for influenza vaccination  -     Influenza (FLUAD) - Quadrivalent (Adjuvanted) *Preferred* (65+) (PF)    Encounter for screening for malignant neoplasm of other sites  -     CT Abdomen Without Contrast; Future; Expected date: 11/12/2020    Long term (current) use of anticoagulants    Paroxysmal atrial fibrillation      RLQ Abdominal Pain  - patient with diagnosis of renal cell carcinoma s/p right nephrectomy approx 6 months ago  - would like to  obtain imaging of abdomen to ensure no spread of disease; however, will not use contrast as patient's most recent Cr is 1.95 and she only has one kidney    A fib  - currently on coumadin, metoprolol, propafenone, and amiodarone    Osteoporosis  - DEXA done March 2020   - lumbar spine T score -1.5. Left femoral neck T score -3.2. Right femoral neck T score -3.1.   - currently on alendronate  - takes vit D and calcium OTC supplements    DMII  - on lantus 55 units daily and novolog with meals  Discussed how to stay healthy including: diet, exercise, refraining from smoking and discussed screening exams / tests needed for age, sex and family Hx.    HFrEF  - EF 20-25% on echo in March 2020  - on metoprolol and lasix  - appears compensated at this time    HLD  - on pravastatin    Constipation  - advised patient to increase water intake. She refuses to use miralax. Currently using stool softeners (pill form) as needed    H/o renal cell carcinoma  - s/p R nephrectomy  - follows at [facility]      Ra Vargas MD

## 2020-11-13 ENCOUNTER — TELEPHONE (OUTPATIENT)
Dept: FAMILY MEDICINE | Facility: CLINIC | Age: 75
End: 2020-11-13

## 2020-11-13 RX ORDER — LIRAGLUTIDE 6 MG/ML
0.6 INJECTION SUBCUTANEOUS DAILY
Qty: 3 ML | Refills: 11 | Status: SHIPPED | OUTPATIENT
Start: 2020-11-13 | End: 2022-01-21

## 2020-11-13 NOTE — TELEPHONE ENCOUNTER
----- Message from Mago Seaman sent at 11/13/2020  9:54 AM CST -----  Contact: NICCI KIDD [[id number]]  Type:  Patient Requesting Call    Who Called: Nicci Harrell Call Back Number: [phone]  Additional Information:  requesting call back, pt has question  What does higher mean when it comes to her kidney levels? Does higher mean better?  Is the new medicine being prescribed a shot or a tablet?

## 2020-11-16 ENCOUNTER — TELEPHONE (OUTPATIENT)
Dept: FAMILY MEDICINE | Facility: CLINIC | Age: 75
End: 2020-11-16

## 2020-11-16 RX ORDER — TRAMADOL HYDROCHLORIDE 50 MG/1
50 TABLET ORAL EVERY 8 HOURS PRN
Qty: 21 TABLET | Refills: 0 | Status: SHIPPED | OUTPATIENT
Start: 2020-11-16 | End: 2020-12-18 | Stop reason: SDUPTHER

## 2020-11-16 NOTE — TELEPHONE ENCOUNTER
Patient states she's taking dicyclomine as advised but it doesn't help with the pain. Patient states she took tramadol in the past when she had her kidney's removed and it worked fine for pain. Patient requesting a prescription for Tramadol sent to [facility] . 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 She will have CT scan done Wednesday.       ----- Message from Shaun Oviedo sent at 11/16/2020  8:24 AM CST -----  Regarding: Medication  Type:  Needs Medical Advice    Who Called:  patient  Would the patient rather a call back or a response via [facility]?  Call back  Best Call Back Number: [phone]  Additional Information:  please call the patient today

## 2020-11-18 ENCOUNTER — HOSPITAL ENCOUNTER (OUTPATIENT)
Dept: RADIOLOGY | Facility: HOSPITAL | Age: 75
Discharge: HOME | End: 2020-11-18
Attending: STUDENT IN AN ORGANIZED HEALTH CARE EDUCATION/TRAINING PROGRAM
Payer: COMMERCIAL

## 2020-11-18 ENCOUNTER — TELEPHONE (OUTPATIENT)
Dept: FAMILY MEDICINE | Facility: CLINIC | Age: 75
End: 2020-11-18

## 2020-11-18 DIAGNOSIS — Z12.89 ENCOUNTER FOR SCREENING FOR MALIGNANT NEOPLASM OF OTHER SITES: ICD-10-CM

## 2020-11-18 DIAGNOSIS — Z90.5 H/O RIGHT NEPHRECTOMY: ICD-10-CM

## 2020-11-18 DIAGNOSIS — Z85.528 HISTORY OF RENAL CELL CARCINOMA: ICD-10-CM

## 2020-11-18 DIAGNOSIS — R10.31 RIGHT LOWER QUADRANT ABDOMINAL PAIN: ICD-10-CM

## 2020-11-18 PROCEDURE — 74176 CT ABD & PELVIS W/O CONTRAST: CPT | Mod: TC,PO

## 2020-11-18 NOTE — TELEPHONE ENCOUNTER
Discussed findings of Ct scan with patient. She would like to wait for referral to general surgery as pain is well controlled and hernias are currently not bothering her.

## 2020-11-20 ENCOUNTER — TELEPHONE (OUTPATIENT)
Dept: FAMILY MEDICINE | Facility: CLINIC | Age: 75
End: 2020-11-20

## 2020-11-20 NOTE — TELEPHONE ENCOUNTER
FYI    Patient states she had a bowel movement after she hung up the phone with office she states she will continue taking the fiber pills as she's currently doing and if that stops working then she will consider trying Miralax or suppositories.  No further action needed at this time.

## 2020-11-20 NOTE — TELEPHONE ENCOUNTER
1/4 MAGNESIUM CITRATE   3 FIBER PILLS   3 STOOL SOFTENERS  SINCE LAST NIGHT    SHE FINALLY HAD A BM TODAY    I ADVISED MIRALAX DAILY - SHE DECLINES TO USE IT BC SHE IS NOT DRINKING ANYTHING ( WANTS PILLS )    SHE HAS PSYLLIUM FIBER PILLS ( COMPARED METAMUCIL )  ON HAND WILL THIS WORK IF SHE TAKES 1 DAILY.  WHAT DO YOU SUGGEST  ?

## 2020-11-20 NOTE — TELEPHONE ENCOUNTER
Lm advising patient to contact office     ----- Message from Primitivo Arciniega sent at 11/20/2020  9:07 AM CST -----  Type:  Needs Medical Advice    Who Called: Self  Reason:Has a question  Would the patient rather a call back or a response via [facility]? call  Best Call Back Number: [phone]  Additional Information: none

## 2020-11-20 NOTE — TELEPHONE ENCOUNTER
MD Nicci Bobo MA 3 hours ago (12:08 PM)     No it does not. We discussed miralax at her last visit. Ask her to consider it. If not, I can send in a prescription for suppositories.     Message text

## 2020-11-23 ENCOUNTER — TELEPHONE (OUTPATIENT)
Dept: FAMILY MEDICINE | Facility: CLINIC | Age: 75
End: 2020-11-23

## 2020-11-23 ENCOUNTER — TELEPHONE (OUTPATIENT)
Dept: NEUROLOGY | Facility: HOSPITAL | Age: 75
End: 2020-11-23

## 2020-11-23 DIAGNOSIS — I50.22 CHRONIC HFREF (HEART FAILURE WITH REDUCED EJECTION FRACTION): ICD-10-CM

## 2020-11-23 RX ORDER — METOPROLOL TARTRATE 50 MG/1
100 TABLET ORAL 2 TIMES DAILY
Qty: 360 TABLET | Refills: 3 | Status: SHIPPED | OUTPATIENT
Start: 2020-11-23 | End: 2021-08-24

## 2020-11-23 NOTE — TELEPHONE ENCOUNTER
----- Message from Aida Steele RN sent at 11/23/2020  3:49 PM CST -----  Please see message that was sent to Dr. Jay West' staff.   ----- Message -----  From: Julia Montgomery  Sent: 11/23/2020   2:00 PM CST  To: Jenna Thompson Staff    Type:  Needs Medical Advice    Who Called: pt  Advice Regarding: she is at [facility] but the metoprolol has not been approved  Would the patient rather a call back or a response via [facility]? call  Best Call Back Number:   Additional Information: n/a      Dr Vargas - I don't see metoprolol on her list. Were you supposed to send in this rx?

## 2020-12-02 ENCOUNTER — TELEPHONE (OUTPATIENT)
Dept: FAMILY MEDICINE | Facility: CLINIC | Age: 75
End: 2020-12-02

## 2020-12-02 DIAGNOSIS — Z12.31 ENCOUNTER FOR SCREENING MAMMOGRAM FOR BREAST CANCER: Primary | ICD-10-CM

## 2020-12-02 NOTE — TELEPHONE ENCOUNTER
Spoke with patient over the phone.  I told her that mammogram has been ordered and someone will contact her to schedule.  She is concerned that the benzos causing diarrhea.  I do not believe this is the case as she had 1 episode of diarrhea and she has been taking Bentyl for many days.

## 2020-12-02 NOTE — TELEPHONE ENCOUNTER
----- Message from [facility] sent at 12/2/2020  3:18 PM CST -----  Pt needs orders put in for annual mammo please put the orders in and reach out to pt at [phone]    Orders pending

## 2020-12-03 ENCOUNTER — ANTI-COAG VISIT (OUTPATIENT)
Dept: CARDIOLOGY | Facility: CLINIC | Age: 75
End: 2020-12-03
Payer: COMMERCIAL

## 2020-12-03 ENCOUNTER — LAB VISIT (OUTPATIENT)
Dept: LAB | Facility: HOSPITAL | Age: 75
End: 2020-12-03
Attending: INTERNAL MEDICINE
Payer: MEDICARE

## 2020-12-03 DIAGNOSIS — I48.91 ATRIAL FIBRILLATION WITH RVR: ICD-10-CM

## 2020-12-03 DIAGNOSIS — Z79.01 LONG TERM (CURRENT) USE OF ANTICOAGULANTS: ICD-10-CM

## 2020-12-03 LAB
INR PPP: 3.1 (ref 0.8–1.2)
PROTHROMBIN TIME: 29.8 SEC (ref 9–12.5)

## 2020-12-03 PROCEDURE — 36415 COLL VENOUS BLD VENIPUNCTURE: CPT | Mod: PO

## 2020-12-03 PROCEDURE — 93793 ANTICOAG MGMT PT WARFARIN: CPT | Mod: S$GLB,,,

## 2020-12-03 PROCEDURE — 93793 PR ANTICOAGULANT MGMT FOR PT TAKING WARFARIN: ICD-10-PCS | Mod: S$GLB,,,

## 2020-12-03 PROCEDURE — 85610 PROTHROMBIN TIME: CPT | Mod: PO

## 2020-12-08 ENCOUNTER — TELEPHONE (OUTPATIENT)
Dept: FAMILY MEDICINE | Facility: CLINIC | Age: 75
End: 2020-12-08

## 2020-12-08 NOTE — TELEPHONE ENCOUNTER
I am sending this back to you to hold until you receive and review the labs     Please document in this message and then we can notify the pt

## 2020-12-08 NOTE — TELEPHONE ENCOUNTER
----- Message from Gaye Parker sent at 12/8/2020  1:14 PM CST -----  Contact: NICCI KIDD [[id number]] @ [phone]  Please call her in ref to a test results you need to know the results of from her outside doctor.

## 2020-12-08 NOTE — TELEPHONE ENCOUNTER
I spoke with the pt   She recently had labs drawn with her surgeon - Dr Reeves at   ( 2 to 3 weeks ago )  He is the doctor that removed her kidney in the past because of kidney cancer  The nurse called her and advised her levels ( she is not sure what levels ) are high which indicates gall stones  I suggested she have Dr Reeves office send us the lab results for you to review    She also would like you to   recommend a   Gastroenterologist that comes here to [place]  Can you please put in a referral ?      I also advised her that Jose Vargas is out of the office this afternoon and she would not hear back from us until tomorrow

## 2020-12-08 NOTE — TELEPHONE ENCOUNTER
Spoke with the pt   She said Dr Reeves advised  she needs to have an upper scope to verify if it gallstones

## 2020-12-10 ENCOUNTER — TELEPHONE (OUTPATIENT)
Dept: FAMILY MEDICINE | Facility: CLINIC | Age: 75
End: 2020-12-10

## 2020-12-10 DIAGNOSIS — M54.31 SCIATICA, RIGHT SIDE: ICD-10-CM

## 2020-12-10 RX ORDER — GABAPENTIN 100 MG/1
100 CAPSULE ORAL 3 TIMES DAILY PRN
Qty: 90 CAPSULE | Refills: 3 | Status: SHIPPED | OUTPATIENT
Start: 2020-12-10 | End: 2021-03-26

## 2020-12-10 NOTE — TELEPHONE ENCOUNTER
This CT scan that she had done on November 18th does confirm that she has gallstones.  A GI doctor will not remove the stones.  If she would like to get her gallbladder out (which may be causing her pain) we can send her to a surgeon.  I do not think referral to GI is appropriate at this time.    As far as having a bowel movement, she can take magnesium citrate or warm prune juice.

## 2020-12-10 NOTE — TELEPHONE ENCOUNTER
----- Message from Nely Castillo sent at 12/10/2020 10:13 AM CST -----  Regarding: pt advice  Contact: [phone]  Pt is calling requesting a call back from someone in your office. Please assist pt. [phone]      1. Please review results in media - regarding gall stones  And her request to see specialist ( she does not want to go to surgeon she wants to see a GI )    2. She had severe diarrhea 6 days ago and she has not had a MB since then. She is constipated and wants to know what to do about it    3. She is now have pain on the left side like she had on the right side when she saw you last.   The pain is in butt not the back between the spine and the hip on the left side for the past 3 days      Please advise -

## 2020-12-10 NOTE — TELEPHONE ENCOUNTER
----- Message from Delicia Hayward sent at 12/10/2020  3:21 PM CST -----  Contact: pt  Pt called would like a call back     Pt can be reached at [phone]

## 2020-12-10 NOTE — TELEPHONE ENCOUNTER
Spoke to pt she stated she seen a surgeon Dr. Reeves and told her she has Gallstones. Dr. Reeves nurse told pt that she would need a light to go down her throat and GI doctor could do that. Pt would like to avoid surgery, and she prefers a GI doctor. Pt states she has not had BM in 6 day and pt is wondering if the gallstones could be the cause.

## 2020-12-10 NOTE — TELEPHONE ENCOUNTER
I have submitted the info to have this info in media removed    The lab results are actually under the lab tab. She had it drawn at [facility]

## 2020-12-10 NOTE — PROGRESS NOTES
Returned patient's phone call. She is to take gabapentin 100mg TID PRN for sciatica. She declines referral to a general surgeon now as she is not having pain.

## 2020-12-18 ENCOUNTER — TELEPHONE (OUTPATIENT)
Dept: FAMILY MEDICINE | Facility: CLINIC | Age: 75
End: 2020-12-18

## 2020-12-18 RX ORDER — TRAMADOL HYDROCHLORIDE 50 MG/1
50 TABLET ORAL EVERY 8 HOURS PRN
Qty: 90 TABLET | Refills: 0 | Status: SHIPPED | OUTPATIENT
Start: 2020-12-18 | End: 2021-03-26 | Stop reason: SDUPTHER

## 2020-12-18 NOTE — PROGRESS NOTES
"12/18/20 called to r/s lab, pt states that she does not need anyone to r/s her lab she will call back when she can go because it is hard for her to walk and she has a walker and the weather that is why she did not go. "I have to go now" pt's hangs up phone.    "

## 2020-12-18 NOTE — TELEPHONE ENCOUNTER
----- Message from Yajaira Meyers sent at 12/18/2020  9:12 AM CST -----  Pt needs a refill on  Tramadol called into pharmacy at   Pt can be reached at [phone]

## 2020-12-18 NOTE — TELEPHONE ENCOUNTER
----- Message from Yajaira Meyers sent at 12/18/2020  9:12 AM CST -----  Pt needs a refill on  Tramadol called into pharmacy at   Pt can be reached at [phone]

## 2020-12-23 ENCOUNTER — TELEPHONE (OUTPATIENT)
Dept: FAMILY MEDICINE | Facility: CLINIC | Age: 75
End: 2020-12-23

## 2021-01-08 NOTE — PROGRESS NOTES
Now patient states she is in the process of moving and can not get a lab at this time; told to scheduled on 1/14 but not 100% sure if she will make to follow up or not.

## 2021-01-11 ENCOUNTER — ANTI-COAG VISIT (OUTPATIENT)
Dept: CARDIOLOGY | Facility: CLINIC | Age: 76
End: 2021-01-11
Payer: COMMERCIAL

## 2021-01-11 ENCOUNTER — LAB VISIT (OUTPATIENT)
Dept: LAB | Facility: HOSPITAL | Age: 76
End: 2021-01-11
Attending: INTERNAL MEDICINE
Payer: MEDICARE

## 2021-01-11 DIAGNOSIS — Z79.01 LONG TERM (CURRENT) USE OF ANTICOAGULANTS: ICD-10-CM

## 2021-01-11 DIAGNOSIS — I48.91 ATRIAL FIBRILLATION WITH RVR: ICD-10-CM

## 2021-01-11 LAB
INR PPP: 3.3 (ref 0.8–1.2)
PROTHROMBIN TIME: 31.6 SEC (ref 9–12.5)

## 2021-01-11 PROCEDURE — 36415 COLL VENOUS BLD VENIPUNCTURE: CPT | Mod: PO

## 2021-01-11 PROCEDURE — 93793 ANTICOAG MGMT PT WARFARIN: CPT | Mod: S$GLB,,,

## 2021-01-11 PROCEDURE — 85610 PROTHROMBIN TIME: CPT | Mod: PO

## 2021-01-11 PROCEDURE — 93793 PR ANTICOAGULANT MGMT FOR PT TAKING WARFARIN: ICD-10-PCS | Mod: S$GLB,,,

## 2021-02-02 ENCOUNTER — LAB VISIT (OUTPATIENT)
Dept: LAB | Facility: HOSPITAL | Age: 76
End: 2021-02-02
Attending: INTERNAL MEDICINE
Payer: COMMERCIAL

## 2021-02-02 DIAGNOSIS — Z79.01 LONG TERM (CURRENT) USE OF ANTICOAGULANTS: ICD-10-CM

## 2021-02-02 LAB
INR PPP: 2.3 (ref 0.8–1.2)
PROTHROMBIN TIME: 22.4 SEC (ref 9–12.5)

## 2021-02-02 PROCEDURE — 36415 COLL VENOUS BLD VENIPUNCTURE: CPT | Mod: PO

## 2021-02-02 PROCEDURE — 85610 PROTHROMBIN TIME: CPT | Mod: PO

## 2021-02-03 ENCOUNTER — ANTI-COAG VISIT (OUTPATIENT)
Dept: CARDIOLOGY | Facility: CLINIC | Age: 76
End: 2021-02-03
Payer: COMMERCIAL

## 2021-02-03 DIAGNOSIS — I48.91 ATRIAL FIBRILLATION WITH RVR: Primary | ICD-10-CM

## 2021-02-03 DIAGNOSIS — Z79.01 LONG TERM (CURRENT) USE OF ANTICOAGULANTS: ICD-10-CM

## 2021-02-03 PROCEDURE — 93793 PR ANTICOAGULANT MGMT FOR PT TAKING WARFARIN: ICD-10-PCS | Mod: S$GLB,,,

## 2021-02-03 PROCEDURE — 93793 ANTICOAG MGMT PT WARFARIN: CPT | Mod: S$GLB,,,

## 2021-02-04 ENCOUNTER — HOSPITAL ENCOUNTER (OUTPATIENT)
Dept: RADIOLOGY | Facility: HOSPITAL | Age: 76
Discharge: HOME | End: 2021-02-04
Attending: UROLOGY
Payer: MEDICARE

## 2021-02-04 DIAGNOSIS — C64.9 RENAL CELL CANCER: ICD-10-CM

## 2021-02-05 ENCOUNTER — HOSPITAL ENCOUNTER (OUTPATIENT)
Dept: RADIOLOGY | Facility: HOSPITAL | Age: 76
Discharge: HOME | End: 2021-02-05
Attending: UROLOGY
Payer: MEDICARE

## 2021-02-05 DIAGNOSIS — C64.9 RENAL CELL CANCER: ICD-10-CM

## 2021-02-05 PROCEDURE — 74176 CT ABD & PELVIS W/O CONTRAST: CPT | Mod: TC,PO

## 2021-02-05 PROCEDURE — 71250 CT THORAX DX C-: CPT | Mod: TC,PO

## 2021-02-26 ENCOUNTER — LAB VISIT (OUTPATIENT)
Dept: LAB | Facility: HOSPITAL | Age: 76
End: 2021-02-26
Attending: INTERNAL MEDICINE
Payer: MEDICARE

## 2021-02-26 DIAGNOSIS — I48.91 ATRIAL FIBRILLATION WITH RVR: ICD-10-CM

## 2021-02-26 DIAGNOSIS — Z79.01 LONG TERM (CURRENT) USE OF ANTICOAGULANTS: ICD-10-CM

## 2021-02-26 LAB
INR PPP: 2 (ref 0.8–1.2)
PROTHROMBIN TIME: 20.4 SEC (ref 9–12.5)

## 2021-02-26 PROCEDURE — 36415 COLL VENOUS BLD VENIPUNCTURE: CPT | Mod: PO

## 2021-02-26 PROCEDURE — 85610 PROTHROMBIN TIME: CPT | Mod: PO

## 2021-03-01 ENCOUNTER — ANTI-COAG VISIT (OUTPATIENT)
Dept: CARDIOLOGY | Facility: CLINIC | Age: 76
End: 2021-03-01
Payer: COMMERCIAL

## 2021-03-01 DIAGNOSIS — I48.91 ATRIAL FIBRILLATION WITH RVR: Primary | ICD-10-CM

## 2021-03-01 DIAGNOSIS — Z79.01 LONG TERM (CURRENT) USE OF ANTICOAGULANTS: ICD-10-CM

## 2021-03-01 PROCEDURE — 93793 ANTICOAG MGMT PT WARFARIN: CPT | Mod: S$GLB,,,

## 2021-03-01 PROCEDURE — 93793 PR ANTICOAGULANT MGMT FOR PT TAKING WARFARIN: ICD-10-PCS | Mod: S$GLB,,,

## 2021-03-03 ENCOUNTER — TELEPHONE (OUTPATIENT)
Dept: FAMILY MEDICINE | Facility: CLINIC | Age: 76
End: 2021-03-03

## 2021-03-08 ENCOUNTER — TELEPHONE (OUTPATIENT)
Dept: INTERNAL MEDICINE | Facility: CLINIC | Age: 76
End: 2021-03-08

## 2021-03-08 DIAGNOSIS — I48.91 ATRIAL FIBRILLATION WITH RVR: Primary | ICD-10-CM

## 2021-03-09 LAB
LEFT EYE DM RETINOPATHY: POSITIVE
RIGHT EYE DM RETINOPATHY: POSITIVE

## 2021-03-15 ENCOUNTER — TELEPHONE (OUTPATIENT)
Dept: FAMILY MEDICINE | Facility: CLINIC | Age: 76
End: 2021-03-15

## 2021-03-15 RX ORDER — INSULIN GLARGINE 100 [IU]/ML
60 INJECTION, SOLUTION SUBCUTANEOUS DAILY
Qty: 18 SYRINGE | Refills: 1 | Status: SHIPPED | OUTPATIENT
Start: 2021-03-15 | End: 2021-03-31 | Stop reason: SDUPTHER

## 2021-03-15 RX ORDER — INSULIN ASPART 100 [IU]/ML
INJECTION, SOLUTION INTRAVENOUS; SUBCUTANEOUS
Qty: 14 SYRINGE | Refills: 1 | Status: SHIPPED | OUTPATIENT
Start: 2021-03-15 | End: 2021-03-31 | Stop reason: SDUPTHER

## 2021-03-23 RX ORDER — TRAMADOL HYDROCHLORIDE 50 MG/1
50 TABLET ORAL EVERY 8 HOURS PRN
Qty: 90 TABLET | Refills: 0 | OUTPATIENT
Start: 2021-03-23

## 2021-03-26 ENCOUNTER — OFFICE VISIT (OUTPATIENT)
Dept: FAMILY MEDICINE | Facility: CLINIC | Age: 76
End: 2021-03-26
Payer: COMMERCIAL

## 2021-03-26 VITALS
WEIGHT: 203.25 LBS | HEART RATE: 89 BPM | HEIGHT: 64 IN | SYSTOLIC BLOOD PRESSURE: 118 MMHG | BODY MASS INDEX: 34.7 KG/M2 | DIASTOLIC BLOOD PRESSURE: 68 MMHG | OXYGEN SATURATION: 95 % | TEMPERATURE: 98 F

## 2021-03-26 DIAGNOSIS — N18.4 TYPE 2 DIABETES MELLITUS WITH STAGE 4 CHRONIC KIDNEY DISEASE, WITH LONG-TERM CURRENT USE OF INSULIN: ICD-10-CM

## 2021-03-26 DIAGNOSIS — N18.4 CKD (CHRONIC KIDNEY DISEASE), STAGE IV: ICD-10-CM

## 2021-03-26 DIAGNOSIS — Z13.220 NEED FOR LIPID SCREENING: Primary | ICD-10-CM

## 2021-03-26 DIAGNOSIS — Z79.4 TYPE 2 DIABETES MELLITUS WITH STAGE 4 CHRONIC KIDNEY DISEASE, WITH LONG-TERM CURRENT USE OF INSULIN: ICD-10-CM

## 2021-03-26 DIAGNOSIS — Z79.899 LONG TERM CURRENT USE OF AMIODARONE: ICD-10-CM

## 2021-03-26 DIAGNOSIS — E11.22 TYPE 2 DIABETES MELLITUS WITH STAGE 4 CHRONIC KIDNEY DISEASE, WITH LONG-TERM CURRENT USE OF INSULIN: ICD-10-CM

## 2021-03-26 PROCEDURE — 1125F PR PAIN SEVERITY QUANTIFIED, PAIN PRESENT: ICD-10-PCS | Mod: S$GLB,,, | Performed by: STUDENT IN AN ORGANIZED HEALTH CARE EDUCATION/TRAINING PROGRAM

## 2021-03-26 PROCEDURE — 1159F MED LIST DOCD IN RCRD: CPT | Mod: S$GLB,,, | Performed by: STUDENT IN AN ORGANIZED HEALTH CARE EDUCATION/TRAINING PROGRAM

## 2021-03-26 PROCEDURE — 3288F PR FALLS RISK ASSESSMENT DOCUMENTED: ICD-10-PCS | Mod: CPTII,S$GLB,, | Performed by: STUDENT IN AN ORGANIZED HEALTH CARE EDUCATION/TRAINING PROGRAM

## 2021-03-26 PROCEDURE — 3288F FALL RISK ASSESSMENT DOCD: CPT | Mod: CPTII,S$GLB,, | Performed by: STUDENT IN AN ORGANIZED HEALTH CARE EDUCATION/TRAINING PROGRAM

## 2021-03-26 PROCEDURE — 3052F HG A1C>EQUAL 8.0%<EQUAL 9.0%: CPT | Mod: CPTII,S$GLB,, | Performed by: STUDENT IN AN ORGANIZED HEALTH CARE EDUCATION/TRAINING PROGRAM

## 2021-03-26 PROCEDURE — 1159F PR MEDICATION LIST DOCUMENTED IN MEDICAL RECORD: ICD-10-PCS | Mod: S$GLB,,, | Performed by: STUDENT IN AN ORGANIZED HEALTH CARE EDUCATION/TRAINING PROGRAM

## 2021-03-26 PROCEDURE — 1101F PR PT FALLS ASSESS DOC 0-1 FALLS W/OUT INJ PAST YR: ICD-10-PCS | Mod: CPTII,S$GLB,, | Performed by: STUDENT IN AN ORGANIZED HEALTH CARE EDUCATION/TRAINING PROGRAM

## 2021-03-26 PROCEDURE — 1125F AMNT PAIN NOTED PAIN PRSNT: CPT | Mod: S$GLB,,, | Performed by: STUDENT IN AN ORGANIZED HEALTH CARE EDUCATION/TRAINING PROGRAM

## 2021-03-26 PROCEDURE — 99213 PR OFFICE/OUTPT VISIT, EST, LEVL III, 20-29 MIN: ICD-10-PCS | Mod: S$GLB,,, | Performed by: STUDENT IN AN ORGANIZED HEALTH CARE EDUCATION/TRAINING PROGRAM

## 2021-03-26 PROCEDURE — 99213 OFFICE O/P EST LOW 20 MIN: CPT | Mod: S$GLB,,, | Performed by: STUDENT IN AN ORGANIZED HEALTH CARE EDUCATION/TRAINING PROGRAM

## 2021-03-26 PROCEDURE — 1101F PT FALLS ASSESS-DOCD LE1/YR: CPT | Mod: CPTII,S$GLB,, | Performed by: STUDENT IN AN ORGANIZED HEALTH CARE EDUCATION/TRAINING PROGRAM

## 2021-03-26 PROCEDURE — 3052F PR MOST RECENT HEMOGLOBIN A1C LEVEL 8.0 - < 9.0%: ICD-10-PCS | Mod: CPTII,S$GLB,, | Performed by: STUDENT IN AN ORGANIZED HEALTH CARE EDUCATION/TRAINING PROGRAM

## 2021-03-26 RX ORDER — AMIODARONE HYDROCHLORIDE 200 MG/1
200 TABLET ORAL DAILY
Qty: 30 TABLET | Refills: 5 | Status: SHIPPED | OUTPATIENT
Start: 2021-03-26 | End: 2021-12-29

## 2021-03-26 RX ORDER — TRAMADOL HYDROCHLORIDE 50 MG/1
50 TABLET ORAL EVERY 8 HOURS PRN
Qty: 90 TABLET | Refills: 2 | Status: SHIPPED | OUTPATIENT
Start: 2021-03-26 | End: 2021-06-14 | Stop reason: SDUPTHER

## 2021-03-29 ENCOUNTER — ANTI-COAG VISIT (OUTPATIENT)
Dept: CARDIOLOGY | Facility: CLINIC | Age: 76
End: 2021-03-29
Payer: COMMERCIAL

## 2021-03-29 ENCOUNTER — LAB VISIT (OUTPATIENT)
Dept: LAB | Facility: HOSPITAL | Age: 76
End: 2021-03-29
Attending: STUDENT IN AN ORGANIZED HEALTH CARE EDUCATION/TRAINING PROGRAM
Payer: MEDICARE

## 2021-03-29 DIAGNOSIS — I48.91 ATRIAL FIBRILLATION WITH RVR: ICD-10-CM

## 2021-03-29 DIAGNOSIS — Z79.01 LONG TERM (CURRENT) USE OF ANTICOAGULANTS: ICD-10-CM

## 2021-03-29 DIAGNOSIS — Z79.899 LONG TERM CURRENT USE OF AMIODARONE: ICD-10-CM

## 2021-03-29 DIAGNOSIS — Z13.220 NEED FOR LIPID SCREENING: ICD-10-CM

## 2021-03-29 DIAGNOSIS — I48.91 ATRIAL FIBRILLATION WITH RVR: Primary | ICD-10-CM

## 2021-03-29 DIAGNOSIS — Z79.4 TYPE 2 DIABETES MELLITUS WITH STAGE 4 CHRONIC KIDNEY DISEASE, WITH LONG-TERM CURRENT USE OF INSULIN: ICD-10-CM

## 2021-03-29 DIAGNOSIS — E11.22 TYPE 2 DIABETES MELLITUS WITH STAGE 4 CHRONIC KIDNEY DISEASE, WITH LONG-TERM CURRENT USE OF INSULIN: ICD-10-CM

## 2021-03-29 DIAGNOSIS — N18.4 TYPE 2 DIABETES MELLITUS WITH STAGE 4 CHRONIC KIDNEY DISEASE, WITH LONG-TERM CURRENT USE OF INSULIN: ICD-10-CM

## 2021-03-29 DIAGNOSIS — N18.4 CKD (CHRONIC KIDNEY DISEASE), STAGE IV: ICD-10-CM

## 2021-03-29 LAB
ALBUMIN SERPL BCP-MCNC: 4 G/DL (ref 3.5–5.2)
ALP SERPL-CCNC: 135 U/L (ref 38–126)
ALT SERPL W/O P-5'-P-CCNC: 20 U/L (ref 10–44)
ANION GAP SERPL CALC-SCNC: 5 MMOL/L (ref 8–16)
AST SERPL-CCNC: 29 U/L (ref 15–46)
BASOPHILS # BLD AUTO: 0.05 K/UL (ref 0–0.2)
BASOPHILS NFR BLD: 0.7 % (ref 0–1.9)
BILIRUB SERPL-MCNC: 0.7 MG/DL (ref 0.1–1)
CALCIUM SERPL-MCNC: 9.3 MG/DL (ref 8.7–10.5)
CHLORIDE SERPL-SCNC: 108 MMOL/L (ref 95–110)
CHOLEST SERPL-MCNC: 150 MG/DL (ref 120–199)
CHOLEST/HDLC SERPL: 2.8 {RATIO} (ref 2–5)
CO2 SERPL-SCNC: 32 MMOL/L (ref 23–29)
CREAT SERPL-MCNC: 1.69 MG/DL (ref 0.5–1.4)
DIFFERENTIAL METHOD: ABNORMAL
EOSINOPHIL # BLD AUTO: 0.2 K/UL (ref 0–0.5)
EOSINOPHIL NFR BLD: 2.5 % (ref 0–8)
ERYTHROCYTE [DISTWIDTH] IN BLOOD BY AUTOMATED COUNT: 12.3 % (ref 11.5–14.5)
EST. GFR  (AFRICAN AMERICAN): 33.5 ML/MIN/1.73 M^2
EST. GFR  (NON AFRICAN AMERICAN): 29.1 ML/MIN/1.73 M^2
ESTIMATED AVG GLUCOSE: 143 MG/DL (ref 68–131)
GLUCOSE SERPL-MCNC: 79 MG/DL (ref 70–110)
HBA1C MFR BLD: 6.6 % (ref 4–5.6)
HCT VFR BLD AUTO: 45.3 % (ref 37–48.5)
HDLC SERPL-MCNC: 53 MG/DL (ref 40–75)
HDLC SERPL: 35.3 % (ref 20–50)
HGB BLD-MCNC: 14.3 G/DL (ref 12–16)
IMM GRANULOCYTES # BLD AUTO: 0.01 K/UL (ref 0–0.04)
IMM GRANULOCYTES NFR BLD AUTO: 0.1 % (ref 0–0.5)
INR PPP: 1.6 (ref 0.8–1.2)
LDLC SERPL CALC-MCNC: 72.6 MG/DL (ref 63–159)
LYMPHOCYTES # BLD AUTO: 1.8 K/UL (ref 1–4.8)
LYMPHOCYTES NFR BLD: 23.8 % (ref 18–48)
MCH RBC QN AUTO: 32.3 PG (ref 27–31)
MCHC RBC AUTO-ENTMCNC: 31.6 G/DL (ref 32–36)
MCV RBC AUTO: 102 FL (ref 82–98)
MONOCYTES # BLD AUTO: 0.6 K/UL (ref 0.3–1)
MONOCYTES NFR BLD: 8.5 % (ref 4–15)
NEUTROPHILS # BLD AUTO: 4.9 K/UL (ref 1.8–7.7)
NEUTROPHILS NFR BLD: 64.4 % (ref 38–73)
NONHDLC SERPL-MCNC: 97 MG/DL
NRBC BLD-RTO: 0 /100 WBC
PLATELET # BLD AUTO: 204 K/UL (ref 150–450)
PMV BLD AUTO: 9.7 FL (ref 9.2–12.9)
POTASSIUM SERPL-SCNC: 4.9 MMOL/L (ref 3.5–5.1)
PROT SERPL-MCNC: 7.4 G/DL (ref 6–8.4)
PROTHROMBIN TIME: 16.7 SEC (ref 9–12.5)
RBC # BLD AUTO: 4.43 M/UL (ref 4–5.4)
SODIUM SERPL-SCNC: 145 MMOL/L (ref 136–145)
TRIGL SERPL-MCNC: 122 MG/DL (ref 30–150)
TSH SERPL DL<=0.005 MIU/L-ACNC: 2.19 UIU/ML (ref 0.4–4)
UUN UR-MCNC: 31 MG/DL (ref 7–17)
WBC # BLD AUTO: 7.57 K/UL (ref 3.9–12.7)

## 2021-03-29 PROCEDURE — 80061 LIPID PANEL: CPT | Performed by: STUDENT IN AN ORGANIZED HEALTH CARE EDUCATION/TRAINING PROGRAM

## 2021-03-29 PROCEDURE — 83036 HEMOGLOBIN GLYCOSYLATED A1C: CPT | Performed by: STUDENT IN AN ORGANIZED HEALTH CARE EDUCATION/TRAINING PROGRAM

## 2021-03-29 PROCEDURE — 84443 ASSAY THYROID STIM HORMONE: CPT | Mod: PO | Performed by: STUDENT IN AN ORGANIZED HEALTH CARE EDUCATION/TRAINING PROGRAM

## 2021-03-29 PROCEDURE — 85610 PROTHROMBIN TIME: CPT | Mod: PO | Performed by: STUDENT IN AN ORGANIZED HEALTH CARE EDUCATION/TRAINING PROGRAM

## 2021-03-29 PROCEDURE — 80053 COMPREHEN METABOLIC PANEL: CPT | Mod: PO | Performed by: STUDENT IN AN ORGANIZED HEALTH CARE EDUCATION/TRAINING PROGRAM

## 2021-03-29 PROCEDURE — 93793 ANTICOAG MGMT PT WARFARIN: CPT | Mod: S$GLB,,,

## 2021-03-29 PROCEDURE — 93793 PR ANTICOAGULANT MGMT FOR PT TAKING WARFARIN: ICD-10-PCS | Mod: S$GLB,,,

## 2021-03-29 PROCEDURE — 85025 COMPLETE CBC W/AUTO DIFF WBC: CPT | Mod: PO | Performed by: STUDENT IN AN ORGANIZED HEALTH CARE EDUCATION/TRAINING PROGRAM

## 2021-03-30 ENCOUNTER — TELEPHONE (OUTPATIENT)
Dept: FAMILY MEDICINE | Facility: CLINIC | Age: 76
End: 2021-03-30

## 2021-03-31 RX ORDER — INSULIN GLARGINE 100 [IU]/ML
60 INJECTION, SOLUTION SUBCUTANEOUS DAILY
Qty: 18 SYRINGE | Refills: 1 | Status: SHIPPED | OUTPATIENT
Start: 2021-03-31 | End: 2021-08-16 | Stop reason: SDUPTHER

## 2021-03-31 RX ORDER — INSULIN ASPART 100 [IU]/ML
INJECTION, SOLUTION INTRAVENOUS; SUBCUTANEOUS
Qty: 14 SYRINGE | Refills: 1 | Status: SHIPPED | OUTPATIENT
Start: 2021-03-31 | End: 2021-08-18

## 2021-04-09 ENCOUNTER — TELEPHONE (OUTPATIENT)
Dept: FAMILY MEDICINE | Facility: CLINIC | Age: 76
End: 2021-04-09

## 2021-04-09 RX ORDER — CEFDINIR 300 MG/1
300 CAPSULE ORAL 2 TIMES DAILY
Qty: 10 CAPSULE | Refills: 0 | Status: SHIPPED | OUTPATIENT
Start: 2021-04-09 | End: 2021-04-14

## 2021-04-19 ENCOUNTER — TELEPHONE (OUTPATIENT)
Dept: FAMILY MEDICINE | Facility: CLINIC | Age: 76
End: 2021-04-19

## 2021-04-27 ENCOUNTER — LAB VISIT (OUTPATIENT)
Dept: LAB | Facility: HOSPITAL | Age: 76
End: 2021-04-27
Attending: STUDENT IN AN ORGANIZED HEALTH CARE EDUCATION/TRAINING PROGRAM
Payer: MEDICARE

## 2021-04-27 ENCOUNTER — ANTI-COAG VISIT (OUTPATIENT)
Dept: CARDIOLOGY | Facility: CLINIC | Age: 76
End: 2021-04-27
Payer: MEDICARE

## 2021-04-27 ENCOUNTER — TELEPHONE (OUTPATIENT)
Dept: FAMILY MEDICINE | Facility: CLINIC | Age: 76
End: 2021-04-27

## 2021-04-27 DIAGNOSIS — I48.91 ATRIAL FIBRILLATION WITH RVR: ICD-10-CM

## 2021-04-27 DIAGNOSIS — Z79.01 LONG TERM (CURRENT) USE OF ANTICOAGULANTS: ICD-10-CM

## 2021-04-27 DIAGNOSIS — I48.91 ATRIAL FIBRILLATION WITH RVR: Primary | ICD-10-CM

## 2021-04-27 LAB
INR PPP: 2.4 (ref 0.8–1.2)
PROTHROMBIN TIME: 24.1 SEC (ref 9–12.5)

## 2021-04-27 PROCEDURE — 85610 PROTHROMBIN TIME: CPT | Mod: PO | Performed by: STUDENT IN AN ORGANIZED HEALTH CARE EDUCATION/TRAINING PROGRAM

## 2021-04-27 PROCEDURE — 93793 PR ANTICOAGULANT MGMT FOR PT TAKING WARFARIN: ICD-10-PCS | Mod: S$GLB,,,

## 2021-04-27 PROCEDURE — 36415 COLL VENOUS BLD VENIPUNCTURE: CPT | Mod: PO | Performed by: STUDENT IN AN ORGANIZED HEALTH CARE EDUCATION/TRAINING PROGRAM

## 2021-04-27 PROCEDURE — 93793 ANTICOAG MGMT PT WARFARIN: CPT | Mod: S$GLB,,,

## 2021-05-19 ENCOUNTER — TELEPHONE (OUTPATIENT)
Dept: FAMILY MEDICINE | Facility: CLINIC | Age: 76
End: 2021-05-19

## 2021-05-20 ENCOUNTER — TELEPHONE (OUTPATIENT)
Dept: FAMILY MEDICINE | Facility: CLINIC | Age: 76
End: 2021-05-20

## 2021-05-21 ENCOUNTER — TELEPHONE (OUTPATIENT)
Dept: FAMILY MEDICINE | Facility: CLINIC | Age: 76
End: 2021-05-21

## 2021-06-01 LAB
LEFT EYE DM RETINOPATHY: POSITIVE
RIGHT EYE DM RETINOPATHY: POSITIVE

## 2021-06-02 ENCOUNTER — PATIENT OUTREACH (OUTPATIENT)
Dept: ADMINISTRATIVE | Facility: HOSPITAL | Age: 76
End: 2021-06-02

## 2021-06-08 ENCOUNTER — LAB VISIT (OUTPATIENT)
Dept: LAB | Facility: HOSPITAL | Age: 76
End: 2021-06-08
Attending: STUDENT IN AN ORGANIZED HEALTH CARE EDUCATION/TRAINING PROGRAM
Payer: MEDICARE

## 2021-06-08 DIAGNOSIS — I48.91 ATRIAL FIBRILLATION WITH RVR: ICD-10-CM

## 2021-06-08 LAB
INR PPP: 2.9 (ref 0.8–1.2)
PROTHROMBIN TIME: 28.2 SEC (ref 9–12.5)

## 2021-06-08 PROCEDURE — 85610 PROTHROMBIN TIME: CPT | Mod: PO | Performed by: STUDENT IN AN ORGANIZED HEALTH CARE EDUCATION/TRAINING PROGRAM

## 2021-06-08 PROCEDURE — 36415 COLL VENOUS BLD VENIPUNCTURE: CPT | Mod: PO | Performed by: STUDENT IN AN ORGANIZED HEALTH CARE EDUCATION/TRAINING PROGRAM

## 2021-06-09 ENCOUNTER — ANTI-COAG VISIT (OUTPATIENT)
Dept: CARDIOLOGY | Facility: CLINIC | Age: 76
End: 2021-06-09
Payer: MEDICARE

## 2021-06-09 DIAGNOSIS — I48.91 ATRIAL FIBRILLATION WITH RVR: Primary | ICD-10-CM

## 2021-06-09 DIAGNOSIS — Z79.01 LONG TERM (CURRENT) USE OF ANTICOAGULANTS: ICD-10-CM

## 2021-06-09 PROCEDURE — 93793 ANTICOAG MGMT PT WARFARIN: CPT | Mod: S$GLB,,,

## 2021-06-09 PROCEDURE — 93793 PR ANTICOAGULANT MGMT FOR PT TAKING WARFARIN: ICD-10-PCS | Mod: S$GLB,,,

## 2021-06-14 ENCOUNTER — TELEPHONE (OUTPATIENT)
Dept: FAMILY MEDICINE | Facility: CLINIC | Age: 76
End: 2021-06-14

## 2021-06-14 RX ORDER — TRAMADOL HYDROCHLORIDE 50 MG/1
50 TABLET ORAL EVERY 8 HOURS PRN
Qty: 90 TABLET | Refills: 2 | Status: SHIPPED | OUTPATIENT
Start: 2021-06-14 | End: 2021-08-16 | Stop reason: SDUPTHER

## 2021-06-15 ENCOUNTER — OFFICE VISIT (OUTPATIENT)
Dept: FAMILY MEDICINE | Facility: CLINIC | Age: 76
End: 2021-06-15
Payer: MEDICARE

## 2021-06-15 VITALS
SYSTOLIC BLOOD PRESSURE: 104 MMHG | HEIGHT: 64 IN | HEART RATE: 86 BPM | WEIGHT: 205.69 LBS | OXYGEN SATURATION: 97 % | DIASTOLIC BLOOD PRESSURE: 60 MMHG | TEMPERATURE: 98 F | BODY MASS INDEX: 35.12 KG/M2

## 2021-06-15 DIAGNOSIS — I50.22 CHRONIC HFREF (HEART FAILURE WITH REDUCED EJECTION FRACTION): ICD-10-CM

## 2021-06-15 DIAGNOSIS — E66.01 CLASS 2 SEVERE OBESITY DUE TO EXCESS CALORIES WITH SERIOUS COMORBIDITY AND BODY MASS INDEX (BMI) OF 35.0 TO 35.9 IN ADULT: ICD-10-CM

## 2021-06-15 DIAGNOSIS — N18.4 TYPE 2 DIABETES MELLITUS WITH STAGE 4 CHRONIC KIDNEY DISEASE, WITH LONG-TERM CURRENT USE OF INSULIN: ICD-10-CM

## 2021-06-15 DIAGNOSIS — R60.9 EDEMA, UNSPECIFIED TYPE: Primary | ICD-10-CM

## 2021-06-15 DIAGNOSIS — Z85.528 HISTORY OF RENAL CELL CARCINOMA: ICD-10-CM

## 2021-06-15 DIAGNOSIS — N18.4 CKD (CHRONIC KIDNEY DISEASE), STAGE IV: ICD-10-CM

## 2021-06-15 DIAGNOSIS — K80.20 GALLSTONES: ICD-10-CM

## 2021-06-15 DIAGNOSIS — Z90.5 H/O RIGHT NEPHRECTOMY: ICD-10-CM

## 2021-06-15 DIAGNOSIS — I48.91 ATRIAL FIBRILLATION WITH RVR: ICD-10-CM

## 2021-06-15 DIAGNOSIS — N18.30 STAGE 3 CHRONIC KIDNEY DISEASE, UNSPECIFIED WHETHER STAGE 3A OR 3B CKD: ICD-10-CM

## 2021-06-15 DIAGNOSIS — E11.22 TYPE 2 DIABETES MELLITUS WITH STAGE 4 CHRONIC KIDNEY DISEASE, WITH LONG-TERM CURRENT USE OF INSULIN: ICD-10-CM

## 2021-06-15 DIAGNOSIS — Z79.4 TYPE 2 DIABETES MELLITUS WITH STAGE 4 CHRONIC KIDNEY DISEASE, WITH LONG-TERM CURRENT USE OF INSULIN: ICD-10-CM

## 2021-06-15 PROCEDURE — 1101F PT FALLS ASSESS-DOCD LE1/YR: CPT | Mod: CPTII,S$GLB,, | Performed by: STUDENT IN AN ORGANIZED HEALTH CARE EDUCATION/TRAINING PROGRAM

## 2021-06-15 PROCEDURE — 1125F PR PAIN SEVERITY QUANTIFIED, PAIN PRESENT: ICD-10-PCS | Mod: S$GLB,,, | Performed by: STUDENT IN AN ORGANIZED HEALTH CARE EDUCATION/TRAINING PROGRAM

## 2021-06-15 PROCEDURE — 1159F MED LIST DOCD IN RCRD: CPT | Mod: S$GLB,,, | Performed by: STUDENT IN AN ORGANIZED HEALTH CARE EDUCATION/TRAINING PROGRAM

## 2021-06-15 PROCEDURE — 99214 PR OFFICE/OUTPT VISIT, EST, LEVL IV, 30-39 MIN: ICD-10-PCS | Mod: S$GLB,,, | Performed by: STUDENT IN AN ORGANIZED HEALTH CARE EDUCATION/TRAINING PROGRAM

## 2021-06-15 PROCEDURE — 3288F FALL RISK ASSESSMENT DOCD: CPT | Mod: CPTII,S$GLB,, | Performed by: STUDENT IN AN ORGANIZED HEALTH CARE EDUCATION/TRAINING PROGRAM

## 2021-06-15 PROCEDURE — 1159F PR MEDICATION LIST DOCUMENTED IN MEDICAL RECORD: ICD-10-PCS | Mod: S$GLB,,, | Performed by: STUDENT IN AN ORGANIZED HEALTH CARE EDUCATION/TRAINING PROGRAM

## 2021-06-15 PROCEDURE — 1125F AMNT PAIN NOTED PAIN PRSNT: CPT | Mod: S$GLB,,, | Performed by: STUDENT IN AN ORGANIZED HEALTH CARE EDUCATION/TRAINING PROGRAM

## 2021-06-15 PROCEDURE — 99214 OFFICE O/P EST MOD 30 MIN: CPT | Mod: S$GLB,,, | Performed by: STUDENT IN AN ORGANIZED HEALTH CARE EDUCATION/TRAINING PROGRAM

## 2021-06-15 PROCEDURE — 3288F PR FALLS RISK ASSESSMENT DOCUMENTED: ICD-10-PCS | Mod: CPTII,S$GLB,, | Performed by: STUDENT IN AN ORGANIZED HEALTH CARE EDUCATION/TRAINING PROGRAM

## 2021-06-15 PROCEDURE — 1101F PR PT FALLS ASSESS DOC 0-1 FALLS W/OUT INJ PAST YR: ICD-10-PCS | Mod: CPTII,S$GLB,, | Performed by: STUDENT IN AN ORGANIZED HEALTH CARE EDUCATION/TRAINING PROGRAM

## 2021-06-16 PROBLEM — E66.01 CLASS 2 SEVERE OBESITY DUE TO EXCESS CALORIES WITH SERIOUS COMORBIDITY AND BODY MASS INDEX (BMI) OF 35.0 TO 35.9 IN ADULT: Status: ACTIVE | Noted: 2021-06-16

## 2021-06-16 PROBLEM — Z85.528 HISTORY OF RENAL CELL CARCINOMA: Status: ACTIVE | Noted: 2021-06-16

## 2021-06-16 PROBLEM — E66.812 CLASS 2 SEVERE OBESITY DUE TO EXCESS CALORIES WITH SERIOUS COMORBIDITY AND BODY MASS INDEX (BMI) OF 35.0 TO 35.9 IN ADULT: Status: ACTIVE | Noted: 2021-06-16

## 2021-06-16 PROBLEM — C64.9 RENAL CELL CARCINOMA: Status: RESOLVED | Noted: 2019-11-23 | Resolved: 2021-06-16

## 2021-06-18 ENCOUNTER — HOSPITAL ENCOUNTER (OUTPATIENT)
Dept: RADIOLOGY | Facility: HOSPITAL | Age: 76
Discharge: HOME | End: 2021-06-18
Attending: STUDENT IN AN ORGANIZED HEALTH CARE EDUCATION/TRAINING PROGRAM
Payer: MEDICARE

## 2021-06-18 DIAGNOSIS — R60.9 EDEMA, UNSPECIFIED TYPE: ICD-10-CM

## 2021-06-18 PROCEDURE — 93971 EXTREMITY STUDY: CPT | Mod: TC,PO,LT

## 2021-06-19 ENCOUNTER — TELEPHONE (OUTPATIENT)
Dept: FAMILY MEDICINE | Facility: CLINIC | Age: 76
End: 2021-06-19

## 2021-06-19 DIAGNOSIS — G89.29 CHRONIC PAIN OF LEFT KNEE: Primary | ICD-10-CM

## 2021-06-19 DIAGNOSIS — M25.562 CHRONIC PAIN OF LEFT KNEE: Primary | ICD-10-CM

## 2021-06-25 ENCOUNTER — HOSPITAL ENCOUNTER (OUTPATIENT)
Dept: RADIOLOGY | Facility: HOSPITAL | Age: 76
Discharge: HOME | End: 2021-06-25
Attending: STUDENT IN AN ORGANIZED HEALTH CARE EDUCATION/TRAINING PROGRAM
Payer: MEDICARE

## 2021-06-25 ENCOUNTER — TELEPHONE (OUTPATIENT)
Dept: FAMILY MEDICINE | Facility: CLINIC | Age: 76
End: 2021-06-25

## 2021-06-25 DIAGNOSIS — M25.562 CHRONIC PAIN OF LEFT KNEE: ICD-10-CM

## 2021-06-25 DIAGNOSIS — M17.12 PRIMARY OSTEOARTHRITIS OF LEFT KNEE: Primary | ICD-10-CM

## 2021-06-25 DIAGNOSIS — G89.29 CHRONIC PAIN OF LEFT KNEE: ICD-10-CM

## 2021-06-25 PROBLEM — M17.11 PRIMARY OSTEOARTHRITIS OF RIGHT KNEE: Status: ACTIVE | Noted: 2021-06-25

## 2021-06-25 PROCEDURE — 73560 X-RAY EXAM OF KNEE 1 OR 2: CPT | Mod: TC,FY,PO,LT

## 2021-06-28 ENCOUNTER — TELEPHONE (OUTPATIENT)
Dept: FAMILY MEDICINE | Facility: CLINIC | Age: 76
End: 2021-06-28

## 2021-07-01 ENCOUNTER — OFFICE VISIT (OUTPATIENT)
Dept: ORTHOPEDICS | Facility: CLINIC | Age: 76
End: 2021-07-01
Payer: MEDICARE

## 2021-07-01 ENCOUNTER — TELEPHONE (OUTPATIENT)
Dept: FAMILY MEDICINE | Facility: CLINIC | Age: 76
End: 2021-07-01

## 2021-07-01 VITALS — BODY MASS INDEX: 35 KG/M2 | WEIGHT: 205 LBS | HEIGHT: 64 IN

## 2021-07-01 DIAGNOSIS — M17.12 PRIMARY OSTEOARTHRITIS OF LEFT KNEE: Primary | ICD-10-CM

## 2021-07-01 PROCEDURE — 1101F PT FALLS ASSESS-DOCD LE1/YR: CPT | Mod: HCNC,CPTII,S$GLB, | Performed by: ORTHOPAEDIC SURGERY

## 2021-07-01 PROCEDURE — 1159F MED LIST DOCD IN RCRD: CPT | Mod: HCNC,CPTII,S$GLB, | Performed by: ORTHOPAEDIC SURGERY

## 2021-07-01 PROCEDURE — 99204 OFFICE O/P NEW MOD 45 MIN: CPT | Mod: 25,HCNC,S$GLB, | Performed by: ORTHOPAEDIC SURGERY

## 2021-07-01 PROCEDURE — 3288F FALL RISK ASSESSMENT DOCD: CPT | Mod: HCNC,CPTII,S$GLB, | Performed by: ORTHOPAEDIC SURGERY

## 2021-07-01 PROCEDURE — 1159F PR MEDICATION LIST DOCUMENTED IN MEDICAL RECORD: ICD-10-PCS | Mod: HCNC,CPTII,S$GLB, | Performed by: ORTHOPAEDIC SURGERY

## 2021-07-01 PROCEDURE — 99999 PR PBB SHADOW E&M-EST. PATIENT-LVL IV: CPT | Mod: PBBFAC,HCNC,, | Performed by: ORTHOPAEDIC SURGERY

## 2021-07-01 PROCEDURE — 99999 PR PBB SHADOW E&M-EST. PATIENT-LVL IV: ICD-10-PCS | Mod: PBBFAC,HCNC,, | Performed by: ORTHOPAEDIC SURGERY

## 2021-07-01 PROCEDURE — 3288F PR FALLS RISK ASSESSMENT DOCUMENTED: ICD-10-PCS | Mod: HCNC,CPTII,S$GLB, | Performed by: ORTHOPAEDIC SURGERY

## 2021-07-01 PROCEDURE — 1125F PR PAIN SEVERITY QUANTIFIED, PAIN PRESENT: ICD-10-PCS | Mod: HCNC,CPTII,S$GLB, | Performed by: ORTHOPAEDIC SURGERY

## 2021-07-01 PROCEDURE — 1125F AMNT PAIN NOTED PAIN PRSNT: CPT | Mod: HCNC,CPTII,S$GLB, | Performed by: ORTHOPAEDIC SURGERY

## 2021-07-01 PROCEDURE — 1101F PR PT FALLS ASSESS DOC 0-1 FALLS W/OUT INJ PAST YR: ICD-10-PCS | Mod: HCNC,CPTII,S$GLB, | Performed by: ORTHOPAEDIC SURGERY

## 2021-07-01 PROCEDURE — 20610 LARGE JOINT ASPIRATION/INJECTION: L KNEE: ICD-10-PCS | Mod: HCNC,LT,S$GLB, | Performed by: ORTHOPAEDIC SURGERY

## 2021-07-01 PROCEDURE — 20610 DRAIN/INJ JOINT/BURSA W/O US: CPT | Mod: HCNC,LT,S$GLB, | Performed by: ORTHOPAEDIC SURGERY

## 2021-07-01 PROCEDURE — 99204 PR OFFICE/OUTPT VISIT, NEW, LEVL IV, 45-59 MIN: ICD-10-PCS | Mod: 25,HCNC,S$GLB, | Performed by: ORTHOPAEDIC SURGERY

## 2021-07-01 RX ORDER — TRIAMCINOLONE ACETONIDE 40 MG/ML
40 INJECTION, SUSPENSION INTRA-ARTICULAR; INTRAMUSCULAR
Status: DISCONTINUED | OUTPATIENT
Start: 2021-07-01 | End: 2021-07-01 | Stop reason: HOSPADM

## 2021-07-01 RX ADMIN — TRIAMCINOLONE ACETONIDE 40 MG: 40 INJECTION, SUSPENSION INTRA-ARTICULAR; INTRAMUSCULAR at 01:07

## 2021-07-07 ENCOUNTER — TELEPHONE (OUTPATIENT)
Dept: FAMILY MEDICINE | Facility: CLINIC | Age: 76
End: 2021-07-07

## 2021-07-07 ENCOUNTER — ANTI-COAG VISIT (OUTPATIENT)
Dept: CARDIOLOGY | Facility: CLINIC | Age: 76
End: 2021-07-07
Payer: MEDICARE

## 2021-07-07 ENCOUNTER — LAB VISIT (OUTPATIENT)
Dept: LAB | Facility: HOSPITAL | Age: 76
End: 2021-07-07
Attending: STUDENT IN AN ORGANIZED HEALTH CARE EDUCATION/TRAINING PROGRAM
Payer: MEDICARE

## 2021-07-07 DIAGNOSIS — Z79.01 LONG TERM (CURRENT) USE OF ANTICOAGULANTS: ICD-10-CM

## 2021-07-07 DIAGNOSIS — I48.91 ATRIAL FIBRILLATION WITH RVR: ICD-10-CM

## 2021-07-07 DIAGNOSIS — I48.91 ATRIAL FIBRILLATION WITH RVR: Primary | ICD-10-CM

## 2021-07-07 LAB
INR PPP: 2.8 (ref 0.8–1.2)
PROTHROMBIN TIME: 27.1 SEC (ref 9–12.5)

## 2021-07-07 PROCEDURE — 93793 ANTICOAG MGMT PT WARFARIN: CPT | Mod: S$GLB,,,

## 2021-07-07 PROCEDURE — 85610 PROTHROMBIN TIME: CPT | Mod: PO | Performed by: STUDENT IN AN ORGANIZED HEALTH CARE EDUCATION/TRAINING PROGRAM

## 2021-07-07 PROCEDURE — 36415 COLL VENOUS BLD VENIPUNCTURE: CPT | Mod: PO | Performed by: STUDENT IN AN ORGANIZED HEALTH CARE EDUCATION/TRAINING PROGRAM

## 2021-07-07 PROCEDURE — 93793 PR ANTICOAGULANT MGMT FOR PT TAKING WARFARIN: ICD-10-PCS | Mod: S$GLB,,,

## 2021-07-19 ENCOUNTER — TELEPHONE (OUTPATIENT)
Dept: FAMILY MEDICINE | Facility: CLINIC | Age: 76
End: 2021-07-19

## 2021-07-28 ENCOUNTER — TELEPHONE (OUTPATIENT)
Dept: FAMILY MEDICINE | Facility: CLINIC | Age: 76
End: 2021-07-28

## 2021-07-29 ENCOUNTER — HOSPITAL ENCOUNTER (OUTPATIENT)
Dept: RADIOLOGY | Facility: HOSPITAL | Age: 76
Discharge: HOME | End: 2021-07-29
Attending: UROLOGY
Payer: MEDICARE

## 2021-07-29 DIAGNOSIS — C64.9 RENAL CELL CANCER: ICD-10-CM

## 2021-07-29 DIAGNOSIS — C64.9 RENAL CARCINOMA: ICD-10-CM

## 2021-07-29 PROCEDURE — 71250 CT THORAX DX C-: CPT | Mod: TC,PO

## 2021-07-29 PROCEDURE — 74176 CT ABD & PELVIS W/O CONTRAST: CPT | Mod: TC,PO

## 2021-08-02 ENCOUNTER — PATIENT OUTREACH (OUTPATIENT)
Dept: ADMINISTRATIVE | Facility: OTHER | Age: 76
End: 2021-08-02

## 2021-08-03 ENCOUNTER — TELEPHONE (OUTPATIENT)
Dept: CARDIOLOGY | Facility: CLINIC | Age: 76
End: 2021-08-03

## 2021-08-05 ENCOUNTER — LAB VISIT (OUTPATIENT)
Dept: LAB | Facility: HOSPITAL | Age: 76
End: 2021-08-05
Attending: STUDENT IN AN ORGANIZED HEALTH CARE EDUCATION/TRAINING PROGRAM
Payer: MEDICARE

## 2021-08-05 DIAGNOSIS — N18.32 STAGE 3B CHRONIC KIDNEY DISEASE: ICD-10-CM

## 2021-08-05 LAB
25(OH)D3+25(OH)D2 SERPL-MCNC: 32 NG/ML (ref 30–96)
ALBUMIN SERPL BCP-MCNC: 3.9 G/DL (ref 3.5–5.2)
ANION GAP SERPL CALC-SCNC: 9 MMOL/L (ref 8–16)
CALCIUM SERPL-MCNC: 9.1 MG/DL (ref 8.7–10.5)
CHLORIDE SERPL-SCNC: 107 MMOL/L (ref 95–110)
CK SERPL-CCNC: 73 U/L (ref 55–170)
CO2 SERPL-SCNC: 26 MMOL/L (ref 23–29)
CREAT SERPL-MCNC: 1.46 MG/DL (ref 0.5–1.4)
CRP SERPL-MCNC: 0.48 MG/DL (ref 0–1)
ERYTHROCYTE [SEDIMENTATION RATE] IN BLOOD BY WESTERGREN METHOD: 38 MM/HR (ref 0–20)
EST. GFR  (AFRICAN AMERICAN): 39.7 ML/MIN/1.73 M^2
EST. GFR  (NON AFRICAN AMERICAN): 34.5 ML/MIN/1.73 M^2
GLUCOSE SERPL-MCNC: 269 MG/DL (ref 70–110)
PHOSPHATE SERPL-MCNC: 4 MG/DL (ref 2.7–4.5)
POTASSIUM SERPL-SCNC: 4.8 MMOL/L (ref 3.5–5.1)
PTH-INTACT SERPL-MCNC: 146.6 PG/ML (ref 9–77)
SODIUM SERPL-SCNC: 142 MMOL/L (ref 136–145)
URATE SERPL-MCNC: 5.1 MG/DL (ref 2.4–5.7)
UUN UR-MCNC: 28 MG/DL (ref 7–17)

## 2021-08-05 PROCEDURE — 84550 ASSAY OF BLOOD/URIC ACID: CPT | Performed by: STUDENT IN AN ORGANIZED HEALTH CARE EDUCATION/TRAINING PROGRAM

## 2021-08-05 PROCEDURE — 80069 RENAL FUNCTION PANEL: CPT | Mod: PO | Performed by: STUDENT IN AN ORGANIZED HEALTH CARE EDUCATION/TRAINING PROGRAM

## 2021-08-05 PROCEDURE — 83970 ASSAY OF PARATHORMONE: CPT | Mod: PO | Performed by: STUDENT IN AN ORGANIZED HEALTH CARE EDUCATION/TRAINING PROGRAM

## 2021-08-05 PROCEDURE — 36415 COLL VENOUS BLD VENIPUNCTURE: CPT | Mod: PO | Performed by: STUDENT IN AN ORGANIZED HEALTH CARE EDUCATION/TRAINING PROGRAM

## 2021-08-05 PROCEDURE — 87340 HEPATITIS B SURFACE AG IA: CPT | Mod: PO | Performed by: STUDENT IN AN ORGANIZED HEALTH CARE EDUCATION/TRAINING PROGRAM

## 2021-08-05 PROCEDURE — 86780 TREPONEMA PALLIDUM: CPT | Mod: PO | Performed by: STUDENT IN AN ORGANIZED HEALTH CARE EDUCATION/TRAINING PROGRAM

## 2021-08-05 PROCEDURE — 86704 HEP B CORE ANTIBODY TOTAL: CPT | Mod: PO | Performed by: STUDENT IN AN ORGANIZED HEALTH CARE EDUCATION/TRAINING PROGRAM

## 2021-08-05 PROCEDURE — 87389 HIV-1 AG W/HIV-1&-2 AB AG IA: CPT | Mod: PO | Performed by: STUDENT IN AN ORGANIZED HEALTH CARE EDUCATION/TRAINING PROGRAM

## 2021-08-05 PROCEDURE — 82550 ASSAY OF CK (CPK): CPT | Mod: PO | Performed by: STUDENT IN AN ORGANIZED HEALTH CARE EDUCATION/TRAINING PROGRAM

## 2021-08-05 PROCEDURE — 86803 HEPATITIS C AB TEST: CPT | Mod: PO | Performed by: STUDENT IN AN ORGANIZED HEALTH CARE EDUCATION/TRAINING PROGRAM

## 2021-08-05 PROCEDURE — 86140 C-REACTIVE PROTEIN: CPT | Mod: PO | Performed by: STUDENT IN AN ORGANIZED HEALTH CARE EDUCATION/TRAINING PROGRAM

## 2021-08-05 PROCEDURE — 82306 VITAMIN D 25 HYDROXY: CPT | Mod: PO | Performed by: STUDENT IN AN ORGANIZED HEALTH CARE EDUCATION/TRAINING PROGRAM

## 2021-08-05 PROCEDURE — 85652 RBC SED RATE AUTOMATED: CPT | Performed by: STUDENT IN AN ORGANIZED HEALTH CARE EDUCATION/TRAINING PROGRAM

## 2021-08-06 LAB
HBV CORE AB SERPL QL IA: POSITIVE
HBV SURFACE AG SERPL QL IA: NEGATIVE
HCV AB SERPL QL IA: NEGATIVE
HIV 1+2 AB+HIV1 P24 AG SERPL QL IA: NEGATIVE

## 2021-08-09 LAB — T PALLIDUM AB SER QL IF: NORMAL

## 2021-08-16 ENCOUNTER — TELEPHONE (OUTPATIENT)
Dept: FAMILY MEDICINE | Facility: CLINIC | Age: 76
End: 2021-08-16

## 2021-08-16 DIAGNOSIS — E11.22 TYPE 2 DIABETES MELLITUS WITH STAGE 4 CHRONIC KIDNEY DISEASE, WITH LONG-TERM CURRENT USE OF INSULIN: ICD-10-CM

## 2021-08-16 DIAGNOSIS — N18.4 TYPE 2 DIABETES MELLITUS WITH STAGE 4 CHRONIC KIDNEY DISEASE, WITH LONG-TERM CURRENT USE OF INSULIN: ICD-10-CM

## 2021-08-16 DIAGNOSIS — Z79.4 TYPE 2 DIABETES MELLITUS WITH STAGE 4 CHRONIC KIDNEY DISEASE, WITH LONG-TERM CURRENT USE OF INSULIN: ICD-10-CM

## 2021-08-16 RX ORDER — LANCETS
EACH MISCELLANEOUS
Qty: 180 EACH | Refills: 3 | Status: SHIPPED | OUTPATIENT
Start: 2021-08-16 | End: 2022-03-09 | Stop reason: SDUPTHER

## 2021-08-16 RX ORDER — INSULIN GLARGINE 100 [IU]/ML
60 INJECTION, SOLUTION SUBCUTANEOUS DAILY
Qty: 18 SYRINGE | Refills: 1 | Status: SHIPPED | OUTPATIENT
Start: 2021-08-16 | End: 2021-08-18 | Stop reason: SDUPTHER

## 2021-08-16 RX ORDER — TRAMADOL HYDROCHLORIDE 50 MG/1
50 TABLET ORAL EVERY 8 HOURS PRN
Qty: 90 TABLET | Refills: 2 | Status: SHIPPED | OUTPATIENT
Start: 2021-08-16 | End: 2021-10-25 | Stop reason: SDUPTHER

## 2021-08-17 ENCOUNTER — LAB VISIT (OUTPATIENT)
Dept: LAB | Facility: HOSPITAL | Age: 76
End: 2021-08-17
Attending: STUDENT IN AN ORGANIZED HEALTH CARE EDUCATION/TRAINING PROGRAM
Payer: MEDICARE

## 2021-08-17 ENCOUNTER — ANTI-COAG VISIT (OUTPATIENT)
Dept: CARDIOLOGY | Facility: CLINIC | Age: 76
End: 2021-08-17
Payer: MEDICARE

## 2021-08-17 DIAGNOSIS — I48.91 ATRIAL FIBRILLATION WITH RVR: ICD-10-CM

## 2021-08-17 DIAGNOSIS — I48.91 ATRIAL FIBRILLATION WITH RVR: Primary | ICD-10-CM

## 2021-08-17 DIAGNOSIS — Z79.01 LONG TERM (CURRENT) USE OF ANTICOAGULANTS: ICD-10-CM

## 2021-08-17 LAB
INR PPP: 2.1 (ref 0.8–1.2)
PROTHROMBIN TIME: 20.9 SEC (ref 9–12.5)

## 2021-08-17 PROCEDURE — 93793 PR ANTICOAGULANT MGMT FOR PT TAKING WARFARIN: ICD-10-PCS | Mod: S$GLB,,,

## 2021-08-17 PROCEDURE — 85610 PROTHROMBIN TIME: CPT | Mod: PO | Performed by: STUDENT IN AN ORGANIZED HEALTH CARE EDUCATION/TRAINING PROGRAM

## 2021-08-17 PROCEDURE — 93793 ANTICOAG MGMT PT WARFARIN: CPT | Mod: S$GLB,,,

## 2021-08-17 PROCEDURE — 36415 COLL VENOUS BLD VENIPUNCTURE: CPT | Mod: PO | Performed by: STUDENT IN AN ORGANIZED HEALTH CARE EDUCATION/TRAINING PROGRAM

## 2021-08-18 ENCOUNTER — TELEPHONE (OUTPATIENT)
Dept: FAMILY MEDICINE | Facility: CLINIC | Age: 76
End: 2021-08-18

## 2021-08-18 RX ORDER — INSULIN GLARGINE 100 [IU]/ML
60 INJECTION, SOLUTION SUBCUTANEOUS DAILY
Qty: 18 SYRINGE | Refills: 1 | Status: SHIPPED | OUTPATIENT
Start: 2021-08-18 | End: 2021-10-25

## 2021-08-18 RX ORDER — INSULIN ASPART 100 [IU]/ML
INJECTION, SOLUTION INTRAVENOUS; SUBCUTANEOUS
Qty: 45 ML | Refills: 3 | Status: SHIPPED | OUTPATIENT
Start: 2021-08-18 | End: 2023-02-06

## 2021-08-20 ENCOUNTER — TELEPHONE (OUTPATIENT)
Dept: FAMILY MEDICINE | Facility: CLINIC | Age: 76
End: 2021-08-20

## 2021-08-20 RX ORDER — DEXTROSE 4 G
1 TABLET,CHEWABLE ORAL
Qty: 1 EACH | Refills: 0 | Status: SHIPPED | OUTPATIENT
Start: 2021-08-20

## 2021-08-24 ENCOUNTER — OFFICE VISIT (OUTPATIENT)
Dept: CARDIOLOGY | Facility: CLINIC | Age: 76
End: 2021-08-24
Payer: MEDICARE

## 2021-08-24 VITALS
SYSTOLIC BLOOD PRESSURE: 102 MMHG | WEIGHT: 210 LBS | HEART RATE: 78 BPM | BODY MASS INDEX: 36.05 KG/M2 | DIASTOLIC BLOOD PRESSURE: 63 MMHG

## 2021-08-24 DIAGNOSIS — I50.20 HEART FAILURE WITH REDUCED EJECTION FRACTION: Primary | ICD-10-CM

## 2021-08-24 DIAGNOSIS — I48.91 ATRIAL FIBRILLATION, UNSPECIFIED TYPE: ICD-10-CM

## 2021-08-24 DIAGNOSIS — N18.4 TYPE 2 DIABETES MELLITUS WITH STAGE 4 CHRONIC KIDNEY DISEASE, WITH LONG-TERM CURRENT USE OF INSULIN: ICD-10-CM

## 2021-08-24 DIAGNOSIS — E11.22 TYPE 2 DIABETES MELLITUS WITH STAGE 4 CHRONIC KIDNEY DISEASE, WITH LONG-TERM CURRENT USE OF INSULIN: ICD-10-CM

## 2021-08-24 DIAGNOSIS — Z79.4 TYPE 2 DIABETES MELLITUS WITH STAGE 4 CHRONIC KIDNEY DISEASE, WITH LONG-TERM CURRENT USE OF INSULIN: ICD-10-CM

## 2021-08-24 PROCEDURE — 1101F PT FALLS ASSESS-DOCD LE1/YR: CPT | Mod: CPTII,S$GLB,, | Performed by: INTERNAL MEDICINE

## 2021-08-24 PROCEDURE — 3288F PR FALLS RISK ASSESSMENT DOCUMENTED: ICD-10-PCS | Mod: CPTII,S$GLB,, | Performed by: INTERNAL MEDICINE

## 2021-08-24 PROCEDURE — 3074F PR MOST RECENT SYSTOLIC BLOOD PRESSURE < 130 MM HG: ICD-10-PCS | Mod: CPTII,S$GLB,, | Performed by: INTERNAL MEDICINE

## 2021-08-24 PROCEDURE — 99999 PR PBB SHADOW E&M-EST. PATIENT-LVL III: ICD-10-PCS | Mod: PBBFAC,,, | Performed by: INTERNAL MEDICINE

## 2021-08-24 PROCEDURE — 93005 EKG 12-LEAD: ICD-10-PCS | Mod: S$GLB,,, | Performed by: INTERNAL MEDICINE

## 2021-08-24 PROCEDURE — 93005 ELECTROCARDIOGRAM TRACING: CPT | Mod: S$GLB,,, | Performed by: INTERNAL MEDICINE

## 2021-08-24 PROCEDURE — 3288F FALL RISK ASSESSMENT DOCD: CPT | Mod: CPTII,S$GLB,, | Performed by: INTERNAL MEDICINE

## 2021-08-24 PROCEDURE — 99214 PR OFFICE/OUTPT VISIT, EST, LEVL IV, 30-39 MIN: ICD-10-PCS | Mod: S$GLB,,, | Performed by: INTERNAL MEDICINE

## 2021-08-24 PROCEDURE — 1126F AMNT PAIN NOTED NONE PRSNT: CPT | Mod: CPTII,S$GLB,, | Performed by: INTERNAL MEDICINE

## 2021-08-24 PROCEDURE — 3074F SYST BP LT 130 MM HG: CPT | Mod: CPTII,S$GLB,, | Performed by: INTERNAL MEDICINE

## 2021-08-24 PROCEDURE — 1160F RVW MEDS BY RX/DR IN RCRD: CPT | Mod: CPTII,S$GLB,, | Performed by: INTERNAL MEDICINE

## 2021-08-24 PROCEDURE — 93010 ELECTROCARDIOGRAM REPORT: CPT | Mod: S$GLB,,, | Performed by: INTERNAL MEDICINE

## 2021-08-24 PROCEDURE — 1101F PR PT FALLS ASSESS DOC 0-1 FALLS W/OUT INJ PAST YR: ICD-10-PCS | Mod: CPTII,S$GLB,, | Performed by: INTERNAL MEDICINE

## 2021-08-24 PROCEDURE — 1159F PR MEDICATION LIST DOCUMENTED IN MEDICAL RECORD: ICD-10-PCS | Mod: CPTII,S$GLB,, | Performed by: INTERNAL MEDICINE

## 2021-08-24 PROCEDURE — 1159F MED LIST DOCD IN RCRD: CPT | Mod: CPTII,S$GLB,, | Performed by: INTERNAL MEDICINE

## 2021-08-24 PROCEDURE — 1126F PR PAIN SEVERITY QUANTIFIED, NO PAIN PRESENT: ICD-10-PCS | Mod: CPTII,S$GLB,, | Performed by: INTERNAL MEDICINE

## 2021-08-24 PROCEDURE — 93010 EKG 12-LEAD: ICD-10-PCS | Mod: S$GLB,,, | Performed by: INTERNAL MEDICINE

## 2021-08-24 PROCEDURE — 3078F PR MOST RECENT DIASTOLIC BLOOD PRESSURE < 80 MM HG: ICD-10-PCS | Mod: CPTII,S$GLB,, | Performed by: INTERNAL MEDICINE

## 2021-08-24 PROCEDURE — 1160F PR REVIEW ALL MEDS BY PRESCRIBER/CLIN PHARMACIST DOCUMENTED: ICD-10-PCS | Mod: CPTII,S$GLB,, | Performed by: INTERNAL MEDICINE

## 2021-08-24 PROCEDURE — 99499 RISK ADDL DX/OHS AUDIT: ICD-10-PCS | Mod: HCNC,S$GLB,, | Performed by: INTERNAL MEDICINE

## 2021-08-24 PROCEDURE — 99214 OFFICE O/P EST MOD 30 MIN: CPT | Mod: S$GLB,,, | Performed by: INTERNAL MEDICINE

## 2021-08-24 PROCEDURE — 3078F DIAST BP <80 MM HG: CPT | Mod: CPTII,S$GLB,, | Performed by: INTERNAL MEDICINE

## 2021-08-24 PROCEDURE — 99999 PR PBB SHADOW E&M-EST. PATIENT-LVL III: CPT | Mod: PBBFAC,,, | Performed by: INTERNAL MEDICINE

## 2021-08-24 PROCEDURE — 99499 UNLISTED E&M SERVICE: CPT | Mod: HCNC,S$GLB,, | Performed by: INTERNAL MEDICINE

## 2021-08-24 RX ORDER — METOPROLOL SUCCINATE 100 MG/1
100 TABLET, EXTENDED RELEASE ORAL DAILY
Qty: 30 TABLET | Refills: 11 | Status: ON HOLD | OUTPATIENT
Start: 2021-08-24 | End: 2022-04-04 | Stop reason: SDUPTHER

## 2021-08-25 ENCOUNTER — TELEPHONE (OUTPATIENT)
Dept: CARDIOLOGY | Facility: CLINIC | Age: 76
End: 2021-08-25

## 2021-10-09 ENCOUNTER — TELEPHONE (OUTPATIENT)
Dept: CARDIOLOGY | Facility: CLINIC | Age: 76
End: 2021-10-09

## 2021-10-11 DIAGNOSIS — C64.9 RENAL CELL CARCINOMA: Primary | ICD-10-CM

## 2021-10-21 ENCOUNTER — ANTI-COAG VISIT (OUTPATIENT)
Dept: CARDIOLOGY | Facility: CLINIC | Age: 76
End: 2021-10-21
Payer: MEDICARE

## 2021-10-21 ENCOUNTER — HOSPITAL ENCOUNTER (OUTPATIENT)
Dept: RADIOLOGY | Facility: HOSPITAL | Age: 76
Discharge: HOME | End: 2021-10-21
Attending: UROLOGY
Payer: MEDICARE

## 2021-10-21 ENCOUNTER — HOSPITAL ENCOUNTER (OUTPATIENT)
Dept: CARDIOLOGY | Facility: HOSPITAL | Age: 76
Discharge: HOME | End: 2021-10-21
Attending: INTERNAL MEDICINE
Payer: MEDICARE

## 2021-10-21 VITALS — WEIGHT: 210 LBS | BODY MASS INDEX: 35.85 KG/M2 | HEIGHT: 64 IN

## 2021-10-21 DIAGNOSIS — I48.91 ATRIAL FIBRILLATION WITH RVR: Primary | ICD-10-CM

## 2021-10-21 DIAGNOSIS — Z79.01 LONG TERM (CURRENT) USE OF ANTICOAGULANTS: ICD-10-CM

## 2021-10-21 DIAGNOSIS — C64.9 RENAL CELL CARCINOMA: ICD-10-CM

## 2021-10-21 DIAGNOSIS — I50.20 HEART FAILURE WITH REDUCED EJECTION FRACTION: ICD-10-CM

## 2021-10-21 PROCEDURE — 93306 TTE W/DOPPLER COMPLETE: CPT | Mod: 26,HCNC,, | Performed by: INTERNAL MEDICINE

## 2021-10-21 PROCEDURE — 71250 CT THORAX DX C-: CPT | Mod: TC,HCNC,PO

## 2021-10-21 PROCEDURE — 74176 CT ABD & PELVIS W/O CONTRAST: CPT | Mod: TC,HCNC,PO

## 2021-10-21 PROCEDURE — 93306 TTE W/DOPPLER COMPLETE: CPT | Mod: HCNC,PO

## 2021-10-21 PROCEDURE — 25500020 PHARM REV CODE 255: Mod: HCNC,PO | Performed by: UROLOGY

## 2021-10-21 PROCEDURE — 93306 ECHO (CUPID ONLY): ICD-10-PCS | Mod: 26,HCNC,, | Performed by: INTERNAL MEDICINE

## 2021-10-21 PROCEDURE — 93793 PR ANTICOAGULANT MGMT FOR PT TAKING WARFARIN: ICD-10-PCS | Mod: S$GLB,,,

## 2021-10-21 PROCEDURE — 93793 ANTICOAG MGMT PT WARFARIN: CPT | Mod: S$GLB,,,

## 2021-10-21 RX ADMIN — IOHEXOL 30 ML: 300 INJECTION, SOLUTION INTRAVENOUS at 10:10

## 2021-10-22 LAB
AORTIC ROOT ANNULUS: 2.46 CM
AORTIC VALVE CUSP SEPERATION: 1.77 CM
AV INDEX (PROSTH): 0.69
AV MEAN GRADIENT: 2 MMHG
AV PEAK GRADIENT: 3 MMHG
AV VALVE AREA: 2.15 CM2
AV VELOCITY RATIO: 0.7
BSA FOR ECHO PROCEDURE: 2.07 M2
CV ECHO LV RWT: 0.46 CM
DOP CALC AO PEAK VEL: 0.87 M/S
DOP CALC AO VTI: 16.38 CM
DOP CALC LVOT AREA: 3.1 CM2
DOP CALC LVOT DIAMETER: 2 CM
DOP CALC LVOT PEAK VEL: 0.61 M/S
DOP CALC LVOT STROKE VOLUME: 35.26 CM3
DOP CALC MV VTI: 23.94 CM
DOP CALCLVOT PEAK VEL VTI: 11.23 CM
ECHO LV POSTERIOR WALL: 1.12 CM (ref 0.6–1.1)
EJECTION FRACTION: 30 %
FRACTIONAL SHORTENING: 17 % (ref 28–44)
INTERVENTRICULAR SEPTUM: 1.09 CM (ref 0.6–1.1)
IVC PROX: 1.4 CM
LA MAJOR: 5.22 CM
LA MINOR: 5.34 CM
LA WIDTH: 4.62 CM
LEFT ATRIUM SIZE: 3.3 CM
LEFT ATRIUM VOLUME INDEX MOD: 32.4 ML/M2
LEFT ATRIUM VOLUME INDEX: 34.2 ML/M2
LEFT ATRIUM VOLUME MOD: 64.87 CM3
LEFT ATRIUM VOLUME: 68.42 CM3
LEFT INTERNAL DIMENSION IN SYSTOLE: 4.06 CM (ref 2.1–4)
LEFT VENTRICLE DIASTOLIC VOLUME INDEX: 55.63 ML/M2
LEFT VENTRICLE DIASTOLIC VOLUME: 111.26 ML
LEFT VENTRICLE MASS INDEX: 100 G/M2
LEFT VENTRICLE SYSTOLIC VOLUME INDEX: 36.2 ML/M2
LEFT VENTRICLE SYSTOLIC VOLUME: 72.38 ML
LEFT VENTRICULAR INTERNAL DIMENSION IN DIASTOLE: 4.87 CM (ref 3.5–6)
LEFT VENTRICULAR MASS: 199.78 G
LV SEPTAL E/E' RATIO: 15.83 M/S
MV MEAN GRADIENT: 1 MMHG
MV PEAK E VEL: 0.95 M/S
MV PEAK GRADIENT: 5 MMHG
MV STENOSIS PRESSURE HALF TIME: 50.19 MS
MV VALVE AREA BY CONTINUITY EQUATION: 1.47 CM2
MV VALVE AREA P 1/2 METHOD: 4.38 CM2
PISA MRMAX VEL: 0.04 M/S
PISA TR MAX VEL: 2.15 M/S
PV PEAK VELOCITY: 0.78 CM/S
RA MAJOR: 4.61 CM
RA PRESSURE: 3 MMHG
RA WIDTH: 3.23 CM
RIGHT VENTRICULAR END-DIASTOLIC DIMENSION: 2.3 CM
TDI SEPTAL: 0.06 M/S
TR MAX PG: 18 MMHG
TRICUSPID ANNULAR PLANE SYSTOLIC EXCURSION: 1.17 CM
TV REST PULMONARY ARTERY PRESSURE: 21 MMHG

## 2021-10-25 ENCOUNTER — TELEPHONE (OUTPATIENT)
Dept: FAMILY MEDICINE | Facility: CLINIC | Age: 76
End: 2021-10-25
Payer: MEDICARE

## 2021-10-25 RX ORDER — INSULIN GLARGINE 100 [IU]/ML
INJECTION, SOLUTION SUBCUTANEOUS
Qty: 60 ML | Refills: 6 | Status: SHIPPED | OUTPATIENT
Start: 2021-10-25 | End: 2023-02-06 | Stop reason: SDUPTHER

## 2021-10-25 RX ORDER — TRAMADOL HYDROCHLORIDE 50 MG/1
50 TABLET ORAL EVERY 8 HOURS PRN
Qty: 90 TABLET | Refills: 2 | Status: SHIPPED | OUTPATIENT
Start: 2021-10-25 | End: 2022-01-26 | Stop reason: SDUPTHER

## 2021-10-28 ENCOUNTER — TELEPHONE (OUTPATIENT)
Dept: CARDIOLOGY | Facility: CLINIC | Age: 76
End: 2021-10-28
Payer: MEDICARE

## 2021-10-29 ENCOUNTER — TELEPHONE (OUTPATIENT)
Dept: CARDIOLOGY | Facility: CLINIC | Age: 76
End: 2021-10-29
Payer: MEDICARE

## 2021-10-29 ENCOUNTER — TELEPHONE (OUTPATIENT)
Dept: FAMILY MEDICINE | Facility: CLINIC | Age: 76
End: 2021-10-29
Payer: MEDICARE

## 2021-11-01 ENCOUNTER — TELEPHONE (OUTPATIENT)
Dept: FAMILY MEDICINE | Facility: CLINIC | Age: 76
End: 2021-11-01
Payer: MEDICARE

## 2021-11-01 ENCOUNTER — TELEPHONE (OUTPATIENT)
Dept: CARDIOLOGY | Facility: CLINIC | Age: 76
End: 2021-11-01
Payer: MEDICARE

## 2021-11-02 ENCOUNTER — TELEPHONE (OUTPATIENT)
Dept: CARDIOLOGY | Facility: CLINIC | Age: 76
End: 2021-11-02
Payer: MEDICARE

## 2021-11-05 ENCOUNTER — OFFICE VISIT (OUTPATIENT)
Dept: FAMILY MEDICINE | Facility: CLINIC | Age: 76
End: 2021-11-05
Payer: MEDICARE

## 2021-11-05 ENCOUNTER — ANTI-COAG VISIT (OUTPATIENT)
Dept: CARDIOLOGY | Facility: CLINIC | Age: 76
End: 2021-11-05
Payer: MEDICARE

## 2021-11-05 ENCOUNTER — LAB VISIT (OUTPATIENT)
Dept: LAB | Facility: HOSPITAL | Age: 76
End: 2021-11-05
Attending: STUDENT IN AN ORGANIZED HEALTH CARE EDUCATION/TRAINING PROGRAM
Payer: MEDICARE

## 2021-11-05 VITALS
TEMPERATURE: 98 F | HEIGHT: 64 IN | BODY MASS INDEX: 37.05 KG/M2 | DIASTOLIC BLOOD PRESSURE: 60 MMHG | OXYGEN SATURATION: 87 % | HEART RATE: 97 BPM | WEIGHT: 217 LBS | SYSTOLIC BLOOD PRESSURE: 120 MMHG

## 2021-11-05 DIAGNOSIS — Z79.4 TYPE 2 DIABETES MELLITUS WITH STAGE 4 CHRONIC KIDNEY DISEASE, WITH LONG-TERM CURRENT USE OF INSULIN: ICD-10-CM

## 2021-11-05 DIAGNOSIS — I48.91 ATRIAL FIBRILLATION WITH RVR: Primary | ICD-10-CM

## 2021-11-05 DIAGNOSIS — K62.5 RECTAL BLEEDING: Primary | ICD-10-CM

## 2021-11-05 DIAGNOSIS — N18.4 CKD (CHRONIC KIDNEY DISEASE), STAGE IV: ICD-10-CM

## 2021-11-05 DIAGNOSIS — Z79.01 LONG TERM (CURRENT) USE OF ANTICOAGULANTS: ICD-10-CM

## 2021-11-05 DIAGNOSIS — N18.4 TYPE 2 DIABETES MELLITUS WITH STAGE 4 CHRONIC KIDNEY DISEASE, WITH LONG-TERM CURRENT USE OF INSULIN: ICD-10-CM

## 2021-11-05 DIAGNOSIS — K62.5 RECTAL BLEEDING: ICD-10-CM

## 2021-11-05 DIAGNOSIS — E11.22 TYPE 2 DIABETES MELLITUS WITH STAGE 4 CHRONIC KIDNEY DISEASE, WITH LONG-TERM CURRENT USE OF INSULIN: ICD-10-CM

## 2021-11-05 DIAGNOSIS — I48.91 ATRIAL FIBRILLATION WITH RVR: ICD-10-CM

## 2021-11-05 LAB
ALBUMIN SERPL BCP-MCNC: 3.9 G/DL (ref 3.5–5.2)
ALP SERPL-CCNC: 158 U/L (ref 38–126)
ALT SERPL W/O P-5'-P-CCNC: 23 U/L (ref 10–44)
ANION GAP SERPL CALC-SCNC: 7 MMOL/L (ref 8–16)
AST SERPL-CCNC: 34 U/L (ref 15–46)
BASOPHILS # BLD AUTO: 0.06 K/UL (ref 0–0.2)
BASOPHILS NFR BLD: 0.8 % (ref 0–1.9)
BILIRUB SERPL-MCNC: 0.8 MG/DL (ref 0.1–1)
CALCIUM SERPL-MCNC: 8.6 MG/DL (ref 8.7–10.5)
CHLORIDE SERPL-SCNC: 102 MMOL/L (ref 95–110)
CO2 SERPL-SCNC: 33 MMOL/L (ref 23–29)
CREAT SERPL-MCNC: 1.58 MG/DL (ref 0.5–1.4)
DIFFERENTIAL METHOD: ABNORMAL
EOSINOPHIL # BLD AUTO: 0.2 K/UL (ref 0–0.5)
EOSINOPHIL NFR BLD: 3 % (ref 0–8)
ERYTHROCYTE [DISTWIDTH] IN BLOOD BY AUTOMATED COUNT: 12.2 % (ref 11.5–14.5)
EST. GFR  (AFRICAN AMERICAN): 36.1 ML/MIN/1.73 M^2
EST. GFR  (NON AFRICAN AMERICAN): 31.3 ML/MIN/1.73 M^2
ESTIMATED AVG GLUCOSE: 140 MG/DL (ref 68–131)
GLUCOSE SERPL-MCNC: 76 MG/DL (ref 70–110)
HBA1C MFR BLD: 6.5 % (ref 4–5.6)
HCT VFR BLD AUTO: 42.4 % (ref 37–48.5)
HGB BLD-MCNC: 13.5 G/DL (ref 12–16)
IMM GRANULOCYTES # BLD AUTO: 0.02 K/UL (ref 0–0.04)
IMM GRANULOCYTES NFR BLD AUTO: 0.3 % (ref 0–0.5)
INR PPP: 1.3 (ref 0.8–1.2)
LYMPHOCYTES # BLD AUTO: 1.5 K/UL (ref 1–4.8)
LYMPHOCYTES NFR BLD: 20.1 % (ref 18–48)
MCH RBC QN AUTO: 32.4 PG (ref 27–31)
MCHC RBC AUTO-ENTMCNC: 31.8 G/DL (ref 32–36)
MCV RBC AUTO: 102 FL (ref 82–98)
MONOCYTES # BLD AUTO: 0.7 K/UL (ref 0.3–1)
MONOCYTES NFR BLD: 9.4 % (ref 4–15)
NEUTROPHILS # BLD AUTO: 4.9 K/UL (ref 1.8–7.7)
NEUTROPHILS NFR BLD: 66.4 % (ref 38–73)
NRBC BLD-RTO: 0 /100 WBC
PLATELET # BLD AUTO: 221 K/UL (ref 150–450)
PMV BLD AUTO: 9.5 FL (ref 9.2–12.9)
POTASSIUM SERPL-SCNC: 4.5 MMOL/L (ref 3.5–5.1)
PROT SERPL-MCNC: 7.2 G/DL (ref 6–8.4)
PROTHROMBIN TIME: 13.5 SEC (ref 9–12.5)
RBC # BLD AUTO: 4.17 M/UL (ref 4–5.4)
SODIUM SERPL-SCNC: 142 MMOL/L (ref 136–145)
UUN UR-MCNC: 24 MG/DL (ref 7–17)
WBC # BLD AUTO: 7.42 K/UL (ref 3.9–12.7)

## 2021-11-05 PROCEDURE — 1159F PR MEDICATION LIST DOCUMENTED IN MEDICAL RECORD: ICD-10-PCS | Mod: CPTII,S$GLB,, | Performed by: STUDENT IN AN ORGANIZED HEALTH CARE EDUCATION/TRAINING PROGRAM

## 2021-11-05 PROCEDURE — 85025 COMPLETE CBC W/AUTO DIFF WBC: CPT | Mod: HCNC,PO | Performed by: STUDENT IN AN ORGANIZED HEALTH CARE EDUCATION/TRAINING PROGRAM

## 2021-11-05 PROCEDURE — 1101F PR PT FALLS ASSESS DOC 0-1 FALLS W/OUT INJ PAST YR: ICD-10-PCS | Mod: CPTII,S$GLB,, | Performed by: STUDENT IN AN ORGANIZED HEALTH CARE EDUCATION/TRAINING PROGRAM

## 2021-11-05 PROCEDURE — 3078F PR MOST RECENT DIASTOLIC BLOOD PRESSURE < 80 MM HG: ICD-10-PCS | Mod: CPTII,S$GLB,, | Performed by: STUDENT IN AN ORGANIZED HEALTH CARE EDUCATION/TRAINING PROGRAM

## 2021-11-05 PROCEDURE — 3288F FALL RISK ASSESSMENT DOCD: CPT | Mod: CPTII,S$GLB,, | Performed by: STUDENT IN AN ORGANIZED HEALTH CARE EDUCATION/TRAINING PROGRAM

## 2021-11-05 PROCEDURE — 99499 UNLISTED E&M SERVICE: CPT | Mod: S$GLB,,, | Performed by: STUDENT IN AN ORGANIZED HEALTH CARE EDUCATION/TRAINING PROGRAM

## 2021-11-05 PROCEDURE — 1126F AMNT PAIN NOTED NONE PRSNT: CPT | Mod: CPTII,S$GLB,, | Performed by: STUDENT IN AN ORGANIZED HEALTH CARE EDUCATION/TRAINING PROGRAM

## 2021-11-05 PROCEDURE — 3074F PR MOST RECENT SYSTOLIC BLOOD PRESSURE < 130 MM HG: ICD-10-PCS | Mod: CPTII,S$GLB,, | Performed by: STUDENT IN AN ORGANIZED HEALTH CARE EDUCATION/TRAINING PROGRAM

## 2021-11-05 PROCEDURE — 83036 HEMOGLOBIN GLYCOSYLATED A1C: CPT | Mod: HCNC | Performed by: STUDENT IN AN ORGANIZED HEALTH CARE EDUCATION/TRAINING PROGRAM

## 2021-11-05 PROCEDURE — 80053 COMPREHEN METABOLIC PANEL: CPT | Mod: HCNC,PO | Performed by: STUDENT IN AN ORGANIZED HEALTH CARE EDUCATION/TRAINING PROGRAM

## 2021-11-05 PROCEDURE — 1126F PR PAIN SEVERITY QUANTIFIED, NO PAIN PRESENT: ICD-10-PCS | Mod: CPTII,S$GLB,, | Performed by: STUDENT IN AN ORGANIZED HEALTH CARE EDUCATION/TRAINING PROGRAM

## 2021-11-05 PROCEDURE — 99214 PR OFFICE/OUTPT VISIT, EST, LEVL IV, 30-39 MIN: ICD-10-PCS | Mod: S$GLB,,, | Performed by: STUDENT IN AN ORGANIZED HEALTH CARE EDUCATION/TRAINING PROGRAM

## 2021-11-05 PROCEDURE — 1160F RVW MEDS BY RX/DR IN RCRD: CPT | Mod: CPTII,S$GLB,, | Performed by: STUDENT IN AN ORGANIZED HEALTH CARE EDUCATION/TRAINING PROGRAM

## 2021-11-05 PROCEDURE — 1101F PT FALLS ASSESS-DOCD LE1/YR: CPT | Mod: CPTII,S$GLB,, | Performed by: STUDENT IN AN ORGANIZED HEALTH CARE EDUCATION/TRAINING PROGRAM

## 2021-11-05 PROCEDURE — 3078F DIAST BP <80 MM HG: CPT | Mod: CPTII,S$GLB,, | Performed by: STUDENT IN AN ORGANIZED HEALTH CARE EDUCATION/TRAINING PROGRAM

## 2021-11-05 PROCEDURE — 3074F SYST BP LT 130 MM HG: CPT | Mod: CPTII,S$GLB,, | Performed by: STUDENT IN AN ORGANIZED HEALTH CARE EDUCATION/TRAINING PROGRAM

## 2021-11-05 PROCEDURE — 36415 COLL VENOUS BLD VENIPUNCTURE: CPT | Mod: HCNC,PO | Performed by: STUDENT IN AN ORGANIZED HEALTH CARE EDUCATION/TRAINING PROGRAM

## 2021-11-05 PROCEDURE — 99499 RISK ADDL DX/OHS AUDIT: ICD-10-PCS | Mod: S$GLB,,, | Performed by: STUDENT IN AN ORGANIZED HEALTH CARE EDUCATION/TRAINING PROGRAM

## 2021-11-05 PROCEDURE — 1160F PR REVIEW ALL MEDS BY PRESCRIBER/CLIN PHARMACIST DOCUMENTED: ICD-10-PCS | Mod: CPTII,S$GLB,, | Performed by: STUDENT IN AN ORGANIZED HEALTH CARE EDUCATION/TRAINING PROGRAM

## 2021-11-05 PROCEDURE — 1159F MED LIST DOCD IN RCRD: CPT | Mod: CPTII,S$GLB,, | Performed by: STUDENT IN AN ORGANIZED HEALTH CARE EDUCATION/TRAINING PROGRAM

## 2021-11-05 PROCEDURE — 3288F PR FALLS RISK ASSESSMENT DOCUMENTED: ICD-10-PCS | Mod: CPTII,S$GLB,, | Performed by: STUDENT IN AN ORGANIZED HEALTH CARE EDUCATION/TRAINING PROGRAM

## 2021-11-05 PROCEDURE — 99214 OFFICE O/P EST MOD 30 MIN: CPT | Mod: S$GLB,,, | Performed by: STUDENT IN AN ORGANIZED HEALTH CARE EDUCATION/TRAINING PROGRAM

## 2021-11-05 PROCEDURE — 85610 PROTHROMBIN TIME: CPT | Mod: HCNC,PO | Performed by: STUDENT IN AN ORGANIZED HEALTH CARE EDUCATION/TRAINING PROGRAM

## 2021-11-05 RX ORDER — ALENDRONATE SODIUM 35 MG/1
35 TABLET ORAL WEEKLY
COMMUNITY
Start: 2021-09-22 | End: 2022-06-14 | Stop reason: SDUPTHER

## 2021-11-05 RX ORDER — ASCORBIC ACID 125 MG
TABLET,CHEWABLE ORAL 2 TIMES DAILY
COMMUNITY
End: 2023-08-16

## 2021-11-08 ENCOUNTER — TELEPHONE (OUTPATIENT)
Dept: FAMILY MEDICINE | Facility: CLINIC | Age: 76
End: 2021-11-08
Payer: MEDICARE

## 2021-11-08 ENCOUNTER — TELEPHONE (OUTPATIENT)
Dept: CARDIOLOGY | Facility: CLINIC | Age: 76
End: 2021-11-08
Payer: MEDICARE

## 2021-11-09 ENCOUNTER — TELEPHONE (OUTPATIENT)
Dept: FAMILY MEDICINE | Facility: CLINIC | Age: 76
End: 2021-11-09
Payer: MEDICARE

## 2021-11-23 ENCOUNTER — OFFICE VISIT (OUTPATIENT)
Dept: CARDIOLOGY | Facility: CLINIC | Age: 76
End: 2021-11-23
Payer: MEDICARE

## 2021-11-23 ENCOUNTER — LAB VISIT (OUTPATIENT)
Dept: LAB | Facility: HOSPITAL | Age: 76
End: 2021-11-23
Attending: STUDENT IN AN ORGANIZED HEALTH CARE EDUCATION/TRAINING PROGRAM
Payer: MEDICARE

## 2021-11-23 VITALS
BODY MASS INDEX: 36.05 KG/M2 | WEIGHT: 211.13 LBS | OXYGEN SATURATION: 95 % | HEART RATE: 108 BPM | DIASTOLIC BLOOD PRESSURE: 79 MMHG | HEIGHT: 64 IN | SYSTOLIC BLOOD PRESSURE: 122 MMHG

## 2021-11-23 DIAGNOSIS — I48.91 ATRIAL FIBRILLATION WITH RVR: ICD-10-CM

## 2021-11-23 DIAGNOSIS — I48.91 ATRIAL FIBRILLATION, UNSPECIFIED TYPE: Primary | ICD-10-CM

## 2021-11-23 LAB
INR PPP: 2.1 (ref 0.8–1.2)
PROTHROMBIN TIME: 21.2 SEC (ref 9–12.5)

## 2021-11-23 PROCEDURE — 99211 OFF/OP EST MAY X REQ PHY/QHP: CPT | Mod: HCNC,S$GLB,, | Performed by: INTERNAL MEDICINE

## 2021-11-23 PROCEDURE — 99211 PR OFFICE/OUTPT VISIT, EST, LEVL I: ICD-10-PCS | Mod: HCNC,S$GLB,, | Performed by: INTERNAL MEDICINE

## 2021-11-23 PROCEDURE — 36415 COLL VENOUS BLD VENIPUNCTURE: CPT | Mod: HCNC,PO | Performed by: STUDENT IN AN ORGANIZED HEALTH CARE EDUCATION/TRAINING PROGRAM

## 2021-11-23 PROCEDURE — 85610 PROTHROMBIN TIME: CPT | Mod: HCNC,PO | Performed by: STUDENT IN AN ORGANIZED HEALTH CARE EDUCATION/TRAINING PROGRAM

## 2021-11-23 PROCEDURE — 99999 PR PBB SHADOW E&M-EST. PATIENT-LVL IV: CPT | Mod: PBBFAC,HCNC,, | Performed by: INTERNAL MEDICINE

## 2021-11-23 PROCEDURE — 99999 PR PBB SHADOW E&M-EST. PATIENT-LVL IV: ICD-10-PCS | Mod: PBBFAC,HCNC,, | Performed by: INTERNAL MEDICINE

## 2021-11-24 DIAGNOSIS — I48.91 ATRIAL FIBRILLATION, UNSPECIFIED TYPE: Primary | ICD-10-CM

## 2021-11-26 ENCOUNTER — TELEPHONE (OUTPATIENT)
Dept: CARDIOLOGY | Facility: CLINIC | Age: 76
End: 2021-11-26
Payer: MEDICARE

## 2021-11-26 ENCOUNTER — TELEPHONE (OUTPATIENT)
Dept: FAMILY MEDICINE | Facility: CLINIC | Age: 76
End: 2021-11-26
Payer: MEDICARE

## 2021-11-26 ENCOUNTER — ANTI-COAG VISIT (OUTPATIENT)
Dept: CARDIOLOGY | Facility: CLINIC | Age: 76
End: 2021-11-26
Payer: MEDICARE

## 2021-11-26 DIAGNOSIS — Z79.01 LONG TERM (CURRENT) USE OF ANTICOAGULANTS: ICD-10-CM

## 2021-11-26 DIAGNOSIS — I48.91 ATRIAL FIBRILLATION WITH RVR: Primary | ICD-10-CM

## 2021-11-26 PROCEDURE — 93793 ANTICOAG MGMT PT WARFARIN: CPT | Mod: S$GLB,,,

## 2021-11-26 PROCEDURE — 93793 PR ANTICOAGULANT MGMT FOR PT TAKING WARFARIN: ICD-10-PCS | Mod: S$GLB,,,

## 2021-12-03 ENCOUNTER — TELEPHONE (OUTPATIENT)
Dept: FAMILY MEDICINE | Facility: CLINIC | Age: 76
End: 2021-12-03
Payer: MEDICARE

## 2021-12-08 ENCOUNTER — TELEPHONE (OUTPATIENT)
Dept: FAMILY MEDICINE | Facility: CLINIC | Age: 76
End: 2021-12-08
Payer: MEDICARE

## 2021-12-08 DIAGNOSIS — I48.91 ATRIAL FIBRILLATION WITH RVR: Primary | ICD-10-CM

## 2021-12-09 ENCOUNTER — ANTI-COAG VISIT (OUTPATIENT)
Dept: CARDIOLOGY | Facility: CLINIC | Age: 76
End: 2021-12-09
Payer: MEDICARE

## 2021-12-09 DIAGNOSIS — Z79.01 LONG TERM (CURRENT) USE OF ANTICOAGULANTS: Primary | ICD-10-CM

## 2021-12-09 DIAGNOSIS — I48.91 ATRIAL FIBRILLATION WITH RVR: ICD-10-CM

## 2021-12-17 ENCOUNTER — PATIENT MESSAGE (OUTPATIENT)
Dept: CARDIOLOGY | Facility: CLINIC | Age: 76
End: 2021-12-17
Payer: MEDICARE

## 2021-12-20 ENCOUNTER — TELEPHONE (OUTPATIENT)
Dept: CARDIOLOGY | Facility: CLINIC | Age: 76
End: 2021-12-20
Payer: MEDICARE

## 2021-12-20 ENCOUNTER — PATIENT OUTREACH (OUTPATIENT)
Dept: ADMINISTRATIVE | Facility: OTHER | Age: 76
End: 2021-12-20
Payer: MEDICARE

## 2021-12-21 ENCOUNTER — OFFICE VISIT (OUTPATIENT)
Dept: CARDIOLOGY | Facility: CLINIC | Age: 76
End: 2021-12-21
Payer: MEDICARE

## 2021-12-21 DIAGNOSIS — I48.91 ATRIAL FIBRILLATION, UNSPECIFIED TYPE: Primary | ICD-10-CM

## 2021-12-21 PROCEDURE — 99499 NO LOS: ICD-10-PCS | Mod: HCNC,95,, | Performed by: INTERNAL MEDICINE

## 2021-12-21 PROCEDURE — 99499 UNLISTED E&M SERVICE: CPT | Mod: HCNC,95,, | Performed by: INTERNAL MEDICINE

## 2021-12-29 RX ORDER — AMIODARONE HYDROCHLORIDE 200 MG/1
TABLET ORAL
Qty: 90 TABLET | Refills: 1 | Status: SHIPPED | OUTPATIENT
Start: 2021-12-29 | End: 2022-06-15 | Stop reason: SDUPTHER

## 2021-12-30 ENCOUNTER — PATIENT MESSAGE (OUTPATIENT)
Dept: CARDIOLOGY | Facility: CLINIC | Age: 76
End: 2021-12-30
Payer: MEDICARE

## 2021-12-30 NOTE — TELEPHONE ENCOUNTER
----- Message from Precious Olguin sent at 12/30/2021  3:51 PM CST -----  Needs advice from nurse:      Who Called:pt  Regarding:returning you call/patient having issues with her phone, she would like you to call her eye doctor to get info. Dr Underwood-  Would the patient rather a call back or [facility]?  Best Call Back number:[phone]  Additional Info:

## 2021-12-30 NOTE — TELEPHONE ENCOUNTER
----- Message from Precious Olguin sent at 12/30/2021  3:29 PM CST -----  Needs advice from nurse:      Who Called:pt  Regarding:needs a call back for a serious eye condition  Would the patient rather a call back or [facility]IA [facility]?  Best Call Back number:[phone]  Additional Info:

## 2022-01-03 ENCOUNTER — TELEPHONE (OUTPATIENT)
Dept: FAMILY MEDICINE | Facility: CLINIC | Age: 77
End: 2022-01-03
Payer: MEDICARE

## 2022-01-03 NOTE — TELEPHONE ENCOUNTER
----- Message from Tara Forman sent at 1/3/2022 10:04 AM CST -----  Contact: [phone]/ Self  Type: Requesting to speak with nurse    Who Called: Pt  Regarding: pt states she saw an eye doctor and has an eye condition, pt would like for office to call Dr. Haque and request clinical notes . Pt was seen on 12/28/2021   Would the patient rather a call back or a response via [facility]? Call back  Best Call Back Number: [phone]  Additional Information: Dr. Tai Haque Phone [phone]

## 2022-01-05 ENCOUNTER — LAB VISIT (OUTPATIENT)
Dept: LAB | Facility: HOSPITAL | Age: 77
End: 2022-01-05
Attending: STUDENT IN AN ORGANIZED HEALTH CARE EDUCATION/TRAINING PROGRAM
Payer: MEDICARE

## 2022-01-05 DIAGNOSIS — Z79.01 LONG TERM (CURRENT) USE OF ANTICOAGULANTS: ICD-10-CM

## 2022-01-05 DIAGNOSIS — I48.91 ATRIAL FIBRILLATION WITH RVR: ICD-10-CM

## 2022-01-05 LAB
INR PPP: 2.9 (ref 0.8–1.2)
PROTHROMBIN TIME: 28.6 SEC (ref 9–12.5)

## 2022-01-05 PROCEDURE — 85610 PROTHROMBIN TIME: CPT | Mod: HCNC,PO | Performed by: STUDENT IN AN ORGANIZED HEALTH CARE EDUCATION/TRAINING PROGRAM

## 2022-01-05 PROCEDURE — 36415 COLL VENOUS BLD VENIPUNCTURE: CPT | Mod: HCNC,PO | Performed by: STUDENT IN AN ORGANIZED HEALTH CARE EDUCATION/TRAINING PROGRAM

## 2022-01-06 ENCOUNTER — ANTI-COAG VISIT (OUTPATIENT)
Dept: CARDIOLOGY | Facility: CLINIC | Age: 77
End: 2022-01-06
Payer: MEDICARE

## 2022-01-06 DIAGNOSIS — I48.91 ATRIAL FIBRILLATION WITH RVR: Primary | ICD-10-CM

## 2022-01-06 DIAGNOSIS — Z79.01 LONG TERM (CURRENT) USE OF ANTICOAGULANTS: ICD-10-CM

## 2022-01-06 PROCEDURE — 99211 PR OFFICE/OUTPT VISIT, EST, LEVL I: ICD-10-PCS | Mod: S$GLB,,, | Performed by: STUDENT IN AN ORGANIZED HEALTH CARE EDUCATION/TRAINING PROGRAM

## 2022-01-06 PROCEDURE — 99211 OFF/OP EST MAY X REQ PHY/QHP: CPT | Mod: S$GLB,,, | Performed by: STUDENT IN AN ORGANIZED HEALTH CARE EDUCATION/TRAINING PROGRAM

## 2022-01-12 ENCOUNTER — TELEPHONE (OUTPATIENT)
Dept: CARDIOLOGY | Facility: CLINIC | Age: 77
End: 2022-01-12
Payer: MEDICARE

## 2022-01-12 NOTE — TELEPHONE ENCOUNTER
----- Message from Angela Cameron sent at 1/12/2022  3:13 PM CST -----  Contact: Lyqmqhy-[phone]  Type:  Needs Medical Advice    Who Called: Pt   Reason for call: pt has a appt scheduled for 1/25 and would like to speak with the nurse regarding if the appt is to soon or if the pt needs to wait until after her blood work on 2/2  Would the patient rather a call back or a response via [facility]?  Call back  Best Call Back Number: [phone]

## 2022-01-13 ENCOUNTER — TELEPHONE (OUTPATIENT)
Dept: CARDIOLOGY | Facility: CLINIC | Age: 77
End: 2022-01-13
Payer: MEDICARE

## 2022-01-13 NOTE — TELEPHONE ENCOUNTER
Pt would like to know would you like to see her after her lab visit. She is scheduled to get labs on 02/02/2022 but has an appointment with you on 01/25/2022. Should she keep her appointment or reschedule appointment after labs. Please advise.

## 2022-01-13 NOTE — TELEPHONE ENCOUNTER
----- Message from Najma Rush sent at 1/13/2022 11:47 AM CST -----  Type: Requesting to speak with nurse         Who Called: PT  Regarding: Calling about labs and appt scheduling please advise   Would the patient rather a call back or a response via [facility]? Call back  Best Call Back Number: #[phone]  Additional Information: n/a

## 2022-01-14 NOTE — TELEPHONE ENCOUNTER
Informed pt that Dr. Patrick will see her after labs. Rescheduled pt's appointment to 02/15/2022. Pt verbalized understanding.

## 2022-01-18 ENCOUNTER — OFFICE VISIT (OUTPATIENT)
Dept: CARDIOLOGY | Facility: CLINIC | Age: 77
End: 2022-01-18
Payer: MEDICARE

## 2022-01-18 VITALS
WEIGHT: 216.69 LBS | SYSTOLIC BLOOD PRESSURE: 108 MMHG | HEART RATE: 75 BPM | HEIGHT: 64 IN | DIASTOLIC BLOOD PRESSURE: 61 MMHG | OXYGEN SATURATION: 95 % | BODY MASS INDEX: 36.99 KG/M2

## 2022-01-18 DIAGNOSIS — E11.22 TYPE 2 DIABETES MELLITUS WITH STAGE 4 CHRONIC KIDNEY DISEASE, WITH LONG-TERM CURRENT USE OF INSULIN: ICD-10-CM

## 2022-01-18 DIAGNOSIS — H53.9 VISION CHANGES: ICD-10-CM

## 2022-01-18 DIAGNOSIS — N18.4 TYPE 2 DIABETES MELLITUS WITH STAGE 4 CHRONIC KIDNEY DISEASE, WITH LONG-TERM CURRENT USE OF INSULIN: ICD-10-CM

## 2022-01-18 DIAGNOSIS — Z79.4 TYPE 2 DIABETES MELLITUS WITH STAGE 4 CHRONIC KIDNEY DISEASE, WITH LONG-TERM CURRENT USE OF INSULIN: ICD-10-CM

## 2022-01-18 DIAGNOSIS — I48.91 ATRIAL FIBRILLATION, UNSPECIFIED TYPE: ICD-10-CM

## 2022-01-18 DIAGNOSIS — I50.20 HEART FAILURE WITH REDUCED EJECTION FRACTION: Primary | ICD-10-CM

## 2022-01-18 PROCEDURE — 1160F PR REVIEW ALL MEDS BY PRESCRIBER/CLIN PHARMACIST DOCUMENTED: ICD-10-PCS | Mod: CPTII,,, | Performed by: INTERNAL MEDICINE

## 2022-01-18 PROCEDURE — 1126F PR PAIN SEVERITY QUANTIFIED, NO PAIN PRESENT: ICD-10-PCS | Mod: CPTII,S$GLB,, | Performed by: INTERNAL MEDICINE

## 2022-01-18 PROCEDURE — 1160F RVW MEDS BY RX/DR IN RCRD: CPT | Mod: CPTII,,, | Performed by: INTERNAL MEDICINE

## 2022-01-18 PROCEDURE — 1159F MED LIST DOCD IN RCRD: CPT | Mod: CPTII,,, | Performed by: INTERNAL MEDICINE

## 2022-01-18 PROCEDURE — 1159F PR MEDICATION LIST DOCUMENTED IN MEDICAL RECORD: ICD-10-PCS | Mod: CPTII,,, | Performed by: INTERNAL MEDICINE

## 2022-01-18 PROCEDURE — 99499 UNLISTED E&M SERVICE: CPT | Mod: S$GLB,,, | Performed by: INTERNAL MEDICINE

## 2022-01-18 PROCEDURE — 1126F AMNT PAIN NOTED NONE PRSNT: CPT | Mod: CPTII,S$GLB,, | Performed by: INTERNAL MEDICINE

## 2022-01-18 PROCEDURE — 3288F PR FALLS RISK ASSESSMENT DOCUMENTED: ICD-10-PCS | Mod: CPTII,S$GLB,, | Performed by: INTERNAL MEDICINE

## 2022-01-18 PROCEDURE — 99999 PR PBB SHADOW E&M-EST. PATIENT-LVL IV: ICD-10-PCS | Mod: PBBFAC,,, | Performed by: INTERNAL MEDICINE

## 2022-01-18 PROCEDURE — 3078F DIAST BP <80 MM HG: CPT | Mod: CPTII,S$GLB,, | Performed by: INTERNAL MEDICINE

## 2022-01-18 PROCEDURE — 3078F PR MOST RECENT DIASTOLIC BLOOD PRESSURE < 80 MM HG: ICD-10-PCS | Mod: CPTII,S$GLB,, | Performed by: INTERNAL MEDICINE

## 2022-01-18 PROCEDURE — 99999 PR PBB SHADOW E&M-EST. PATIENT-LVL IV: CPT | Mod: PBBFAC,,, | Performed by: INTERNAL MEDICINE

## 2022-01-18 PROCEDURE — 1101F PT FALLS ASSESS-DOCD LE1/YR: CPT | Mod: CPTII,S$GLB,, | Performed by: INTERNAL MEDICINE

## 2022-01-18 PROCEDURE — 3074F PR MOST RECENT SYSTOLIC BLOOD PRESSURE < 130 MM HG: ICD-10-PCS | Mod: CPTII,S$GLB,, | Performed by: INTERNAL MEDICINE

## 2022-01-18 PROCEDURE — 99214 OFFICE O/P EST MOD 30 MIN: CPT | Mod: PBBFAC,PO | Performed by: INTERNAL MEDICINE

## 2022-01-18 PROCEDURE — 99215 PR OFFICE/OUTPT VISIT, EST, LEVL V, 40-54 MIN: ICD-10-PCS | Mod: S$GLB,,, | Performed by: INTERNAL MEDICINE

## 2022-01-18 PROCEDURE — 3074F SYST BP LT 130 MM HG: CPT | Mod: CPTII,S$GLB,, | Performed by: INTERNAL MEDICINE

## 2022-01-18 PROCEDURE — 1101F PR PT FALLS ASSESS DOC 0-1 FALLS W/OUT INJ PAST YR: ICD-10-PCS | Mod: CPTII,S$GLB,, | Performed by: INTERNAL MEDICINE

## 2022-01-18 PROCEDURE — 3288F FALL RISK ASSESSMENT DOCD: CPT | Mod: CPTII,S$GLB,, | Performed by: INTERNAL MEDICINE

## 2022-01-18 PROCEDURE — 99215 OFFICE O/P EST HI 40 MIN: CPT | Mod: S$GLB,,, | Performed by: INTERNAL MEDICINE

## 2022-01-18 PROCEDURE — 99499 RISK ADDL DX/OHS AUDIT: ICD-10-PCS | Mod: S$GLB,,, | Performed by: INTERNAL MEDICINE

## 2022-01-18 RX ORDER — ACETAMINOPHEN 325 MG/1
325 TABLET ORAL EVERY 6 HOURS PRN
COMMUNITY

## 2022-01-18 NOTE — PROGRESS NOTES
Cardiology Clinic note    Subjective:   Patient ID:  Yajaira Terry is a 76 y.o. female who presents for AFib FUP    HPI:   HFrEF: Endorses some orthopnea, LE swelling- only takes furosemide takes x3/week now (was not taking at all until last week; hesitant d/t urination). She declined. Weight 215 lbs - up from 6 months; 10 lbs. Following salt restriction    Afib: No palpitations, irregular heart beats, syncope or near syncope    DM: Managed with PCP     Tobacco Quit 2010  FH No premature CAD    Patient Active Problem List    Diagnosis Date Noted    Primary osteoarthritis of left knee 07/01/2021    Primary osteoarthritis of right knee 06/25/2021    Class 2 severe obesity due to excess calories with serious comorbidity and body mass index (BMI) of 35.0 to 35.9 in adult 06/16/2021    History of renal cell carcinoma 06/16/2021    Long term (current) use of anticoagulants 02/28/2020    CKD (chronic kidney disease), stage IV 02/27/2020     - history of unilateral nephrectomy due to renal cell carcinoma by Dr Emilia Lenz at [facility] (urology)      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 02/27/2020    Age-related osteoporosis without current pathological fracture 02/27/2020    Atrial fibrillation with RVR 11/23/2019    Chronic HFrEF (heart failure with reduced ejection fraction) 11/23/2019    H/O right nephrectomy 11/23/2019    Type 2 diabetes mellitus, with long-term current use of insulin 11/23/2019    Normocytic anemia 11/23/2019    Mitral regurgitation 11/23/2019    Tricuspid regurgitation 11/23/2019       Patient's Medications   New Prescriptions    No medications on file   Previous Medications    ACETAMINOPHEN (TYLENOL) 325 MG TABLET    Take 325 mg by mouth every 6 (six) hours as needed for Pain.    ALENDRONATE (FOSAMAX) 35 MG TABLET    Take 35 mg by mouth once a week.    AMIODARONE (PACERONE) 200 MG TAB    TAKE 1 TABLET BY MOUTH EVERY DAY    BLOOD  "SUGAR DIAGNOSTIC STRP    1 strip by Misc.(Non-Drug; Combo Route) route 2 (two) times a day.    BLOOD-GLUCOSE METER (TRUE METRIX GLUCOSE METER) MISC    1 each by Misc.(Non-Drug; Combo Route) route as needed (to check blood sugar).    BLOOD-GLUCOSE METER KIT    To check BG 2 times daily, to use with insurance preferred meter    CHOLECALCIFEROL, VITAMIN D3, 25 MCG (1,000 UNIT) CHEW    Take by mouth 2 (two) times a day.    COMBIGAN 0.2-0.5 % DROP    Place 1 drop into both eyes 2 (two) times daily.    DOCUSATE SODIUM (COLACE) 100 MG CAPSULE    Take 100 mg by mouth 2 (two) times daily.    FUROSEMIDE (LASIX) 20 MG TABLET    TAKE 1 TABLET BY MOUTH TWICE A DAY    INSULIN ASPART U-100 (NOVOLOG FLEXPEN U-100 INSULIN) 100 UNIT/ML (3 ML) INPN PEN    INJECT 15 UNITS UNDER THE SKIN THREE TIMES DAILY WITH MEALS.    LANCETS MISC    To check BG 2 times daily, to use with insurance preferred meter    LANTUS SOLOSTAR U-100 INSULIN GLARGINE 100 UNITS/ML (3ML) SUBQ PEN    INJECT 60 UNITS SUBCUTANEOUSLY ONCE DAILY.    LATANOPROST 0.005 % OPHTHALMIC SOLUTION    PLACE 1 DROP INTO LEFT EYE AT BEDTIME    LIRAGLUTIDE 0.6 MG/0.1 ML, 18 MG/3 ML, SUBQ PNIJ (VICTOZA 2-PENELOPE) 0.6 MG/0.1 ML (18 MG/3 ML) PNIJ PEN    Inject 0.6 mg into the skin once daily.    METOPROLOL SUCCINATE (TOPROL-XL) 100 MG 24 HR TABLET    Take 1 tablet (100 mg total) by mouth once daily.    PEN NEEDLE, DIABETIC 33 GAUGE X 5/16" NDLE    1 Units by Misc.(Non-Drug; Combo Route) route 3 (three) times daily as needed (to inject insulin).    PRAVASTATIN (PRAVACHOL) 40 MG TABLET    TAKE 1 TABLET BY MOUTH EVERY DAY IN THE EVENING    TRAMADOL (ULTRAM) 50 MG TABLET    Take 1 tablet (50 mg total) by mouth every 8 (eight) hours as needed for Pain.   Modified Medications    No medications on file   Discontinued Medications    No medications on file        Review of Systems   Constitutional: Negative for fever.   HENT: Negative for nosebleeds.    Cardiovascular: Negative for chest pain, " "irregular heartbeat, near-syncope, palpitations, paroxysmal nocturnal dyspnea and syncope.        As above   Respiratory: Negative for hemoptysis.    Hematologic/Lymphatic: Negative for bleeding problem.   Musculoskeletal: Positive for arthritis.   Gastrointestinal: Negative for hematochezia.   Genitourinary: Negative for hematuria.   Neurological: Negative for seizures.   Allergic/Immunologic: Negative for environmental allergies.         Objective:   Vitals  Vitals:    01/18/22 1442   BP: 108/61   Pulse: 75   SpO2: 95%   Weight: 98.3 kg (216 lb 11.2 oz)   Height: 5' 4" (1.626 m)          Physical Exam  Constitutional:       General: She is not in acute distress.     Appearance: She is well-developed. She is not diaphoretic.   HENT:      Head: Normocephalic.   Neck:      Vascular: JVD: Unable to assess d/t body habitus.   Cardiovascular:      Rate and Rhythm: Normal rate and regular rhythm.      Heart sounds: No murmur heard.  No friction rub. No gallop.    Pulmonary:      Effort: Pulmonary effort is normal. No respiratory distress.      Breath sounds: Normal breath sounds.   Abdominal:      Palpations: Abdomen is soft.      Tenderness: There is no abdominal tenderness.   Musculoskeletal:         General: Swelling (L>R) present.      Cervical back: Normal range of motion.   Skin:     General: Skin is warm.   Neurological:      Mental Status: She is alert.   Psychiatric:         Mood and Affect: Mood normal.             Assessment:     1. Heart failure with reduced ejection fraction    2. Atrial fibrillation, unspecified type    3. Type 2 diabetes mellitus with stage 4 chronic kidney disease, with long-term current use of insulin    4. Vision changes        Plan:   Yajaira Terry is a 76 y.o. female h/o Afib, HFrEF, DM    - EKG personally reviewed. My interpretation:  8/24/21: Afib 70s. STD lateral leads (similar to prior EKGs)    Atrial Fibrillation  - CHADS2-VASc 5 (CHF, Age x2, DM, female)  - HASBLED 1 (Age)  - " "Coumadin. Followed by coumadin clinic  Estimated body mass index is 37.2 kg/m² as calculated from the following:    Height as of this encounter: 5' 4" (1.626 m).    Weight as of this encounter: 98.3 kg (216 lb 11.2 oz).  76 y.o.  Lab Results   Component Value Date    CREATININE 1.58 (H) 11/05/2021    AST 34 11/05/2021    BILITOT 0.8 11/05/2021   - She reports she does not take Propafenone.  - She reports she has been offered DCCV in the past but decided not to pursue. She has agreed most recently however scheduling delayed d/t INR checks. Does not agree to QING.  - Discussed the most likely etiology of her LVEF is likely related to Afib. She is agreeable to see EP  - Reports vision changes for which her ophthalmologist recommended stroke work-up. Neurology referral placed    HFrEF  - NYHA II-III  - Echo Oct 2021: LVEF 30%, DD. Normal RVSF. Mild-mod MR. Mild TR. Mod CO. PASP 21, CVP 3  - Echo 2020: LVEF 20-25%. Afib with restrictive filling pattern. GHK, local segmental akinesis of AS-anterior LV wall. Mild LAE. Mild-mod MR, mild TR. PASP 21, CVP 3  - Cath 2017: Normal coronary arteries  - Unable to start ARB/ACEi or hydralazine/ISMN d/t hypotension  - Changed metoprolol tartrate to metoprolol succinate  Lab Results   Component Value Date    CREATININE 1.58 (H) 11/05/2021    K 4.5 11/05/2021   - Furosemide - discussed primarily used for fluid status and concern may be volume up. Discussed importance of compliance. Concerns for recurrent Afib (after DCCV) if hypervolemic  - Daily weights  - First thing in the morning: Pee, take off clothes, step on the scale.  - Write down the date, and the weight. Maintain this chart everyday  - If weight increases by > 3 lbs in a day, or > 5 lbs in a week, take an extra pill of furosemide. Call the office.  - If worsening symptoms, go to the ED  - Salt restriction    DM  Lab Results   Component Value Date    HGBA1C 6.5 (H) 11/05/2021   Managed by PCP    Continue with current medical " plan and lifestyle changes.    Orders Placed This Encounter   Procedures    Ambulatory referral/consult to Neurology     Standing Status:   Future     Standing Expiration Date:   2/18/2023     Referral Priority:   Routine     Referral Type:   Consultation     Referral Reason:   Specialty Services Required     Requested Specialty:   Neurology     Number of Visits Requested:   1       Follow up as scheduled  Return sooner for concerns or questions. If symptoms persist go to the ED    She expressed verbal understanding and agreed with the plan      Anila Patrick MD  Interventional Cardiology  [facility]  Phone: [phone]

## 2022-01-18 NOTE — PATIENT INSTRUCTIONS
- Daily weights  - First thing in the morning: Pee, take off clothes, step on the scale.  - Write down the date, and the weight. Maintain this chart everyday  - If weight increases by > 3 lbs in a day, or > 5 lbs in a week, take an extra pill of furosemide. Call the office.  - If worsening symptoms, go to the ED  - Salt restriction

## 2022-01-21 ENCOUNTER — TELEPHONE (OUTPATIENT)
Dept: CARDIOLOGY | Facility: CLINIC | Age: 77
End: 2022-01-21
Payer: MEDICARE

## 2022-01-25 ENCOUNTER — TELEPHONE (OUTPATIENT)
Dept: CARDIOLOGY | Facility: CLINIC | Age: 77
End: 2022-01-25
Payer: MEDICARE

## 2022-01-26 ENCOUNTER — TELEPHONE (OUTPATIENT)
Dept: FAMILY MEDICINE | Facility: CLINIC | Age: 77
End: 2022-01-26
Payer: MEDICARE

## 2022-01-26 RX ORDER — TRAMADOL HYDROCHLORIDE 50 MG/1
50 TABLET ORAL EVERY 8 HOURS PRN
Qty: 90 TABLET | Refills: 2 | Status: SHIPPED | OUTPATIENT
Start: 2022-01-26 | End: 2022-04-08

## 2022-01-26 NOTE — TELEPHONE ENCOUNTER
----- Message from Mariel Jose sent at 1/26/2022  9:53 AM CST -----  Contact: [phone]  Type:  RX Refill Request    Who Called: pt  Refill or New Rx:refill  RX Name and Strength:traMADoL (ULTRAM) 50 mg tablet  How is the patient currently taking it? (ex. 1XDay):as needed  Is this a 30 day or 90 day RX:90  Preferred Pharmacy with phone number:[facility]   Local or Mail Order:local  Ordering Provider:   Would the patient rather a call back or a response via [facility]? Call back  Best Call Back Number:[phone]   Additional Information:

## 2022-01-31 ENCOUNTER — HOSPITAL ENCOUNTER (OUTPATIENT)
Dept: RADIOLOGY | Facility: HOSPITAL | Age: 77
Discharge: HOME | End: 2022-01-31
Attending: UROLOGY
Payer: MEDICARE

## 2022-01-31 DIAGNOSIS — C64.9 RENAL CELL CANCER: ICD-10-CM

## 2022-01-31 DIAGNOSIS — C64.9 RENAL CELL CANCER: Primary | ICD-10-CM

## 2022-01-31 PROCEDURE — 74176 CT ABD & PELVIS W/O CONTRAST: CPT | Mod: TC,HCNC,PO

## 2022-01-31 RX ORDER — TRAMADOL HYDROCHLORIDE 50 MG/1
TABLET ORAL
Qty: 90 TABLET | OUTPATIENT
Start: 2022-01-31

## 2022-01-31 NOTE — TELEPHONE ENCOUNTER
No new care gaps identified.  Powered by [employer] by [employer]. Reference number: [id number].   1/31/2022 4:28:54 PM CST

## 2022-02-01 ENCOUNTER — ANTI-COAG VISIT (OUTPATIENT)
Dept: CARDIOLOGY | Facility: CLINIC | Age: 77
End: 2022-02-01
Payer: MEDICARE

## 2022-02-01 DIAGNOSIS — Z79.01 LONG TERM (CURRENT) USE OF ANTICOAGULANTS: ICD-10-CM

## 2022-02-01 DIAGNOSIS — I48.91 ATRIAL FIBRILLATION WITH RVR: Primary | ICD-10-CM

## 2022-02-01 PROCEDURE — 99211 OFF/OP EST MAY X REQ PHY/QHP: CPT | Mod: S$GLB,,, | Performed by: STUDENT IN AN ORGANIZED HEALTH CARE EDUCATION/TRAINING PROGRAM

## 2022-02-01 PROCEDURE — 99211 PR OFFICE/OUTPT VISIT, EST, LEVL I: ICD-10-PCS | Mod: S$GLB,,, | Performed by: STUDENT IN AN ORGANIZED HEALTH CARE EDUCATION/TRAINING PROGRAM

## 2022-02-10 ENCOUNTER — TELEPHONE (OUTPATIENT)
Dept: FAMILY MEDICINE | Facility: CLINIC | Age: 77
End: 2022-02-10
Payer: MEDICARE

## 2022-02-10 RX ORDER — CLINDAMYCIN HYDROCHLORIDE 300 MG/1
300 CAPSULE ORAL 3 TIMES DAILY
Qty: 15 CAPSULE | Refills: 0 | Status: SHIPPED | OUTPATIENT
Start: 2022-02-10 | End: 2022-02-16 | Stop reason: SDUPTHER

## 2022-02-10 NOTE — TELEPHONE ENCOUNTER
----- Message from Cheryl Hannon sent at 2/10/2022  8:07 AM CST -----  Type:  Same Day Appointment Request    Caller is requesting a same day appointment.  Caller declined first available appointment listed below.    Name of Caller:PT   When is the first available appointment?   Symptoms:   Best Call Back Number: [phone]  Additional Information:   wants to be seen today but will not say why, says it's private

## 2022-02-10 NOTE — TELEPHONE ENCOUNTER
Prescription sent to [facility]. Apply warm compress. If it does not improve with antibiotics, it will have to be drained.

## 2022-02-10 NOTE — TELEPHONE ENCOUNTER
Spoke with pt she stated that she had a small lump on her butt pt stated it has gotten bigger and pains her to sit down she stated that it is not draining any kind of fluid and would like to know can  sent her in rx for does she have to have it drained

## 2022-02-14 ENCOUNTER — PATIENT OUTREACH (OUTPATIENT)
Dept: ADMINISTRATIVE | Facility: OTHER | Age: 77
End: 2022-02-14
Payer: MEDICARE

## 2022-02-14 ENCOUNTER — TELEPHONE (OUTPATIENT)
Dept: FAMILY MEDICINE | Facility: CLINIC | Age: 77
End: 2022-02-14
Payer: MEDICARE

## 2022-02-14 NOTE — PROGRESS NOTES
Health Maintenance Due   Topic Date Due    COVID-19 Vaccine (1) Never done    Influenza Vaccine (1) 09/01/2021    Foot Exam  03/26/2022     Updates were requested from care everywhere.  Chart was reviewed for overdue Proactive [facility] Encounters ([facility]) topics (CRS, Breast Cancer Screening, Eye exam)  Health Maintenance has been updated.  LINKS immunization registry triggered.  Immunizations were reconciled.

## 2022-02-14 NOTE — TELEPHONE ENCOUNTER
L/M for pt to return our call.     ----- Message from Jorge Luis Davalos sent at 2/14/2022  8:51 AM CST -----  Type: Patient Call Back         Who called:Patient          What is the request in detail:Regarding a few questions and concerns          Can the clinic reply by [facility]?no          Would the patient rather a call back or a response via [facility]?call back          Best call back number:[phone] (mobile)          Additional Information:           Thank You

## 2022-02-14 NOTE — TELEPHONE ENCOUNTER
----- Message from Cheryl Hannon sent at 2/14/2022  3:18 PM CST -----  Type:  Patient Returning Call    Who Called: Pt   Would the patient rather a call back or a response via [facility]? Call back   Best Call Back Number: [phone]  Additional Information:   calling to speak with Dr Ra Vargas ... rather not say what it's in ref to..

## 2022-02-16 ENCOUNTER — TELEPHONE (OUTPATIENT)
Dept: FAMILY MEDICINE | Facility: CLINIC | Age: 77
End: 2022-02-16
Payer: MEDICARE

## 2022-02-16 RX ORDER — CLINDAMYCIN HYDROCHLORIDE 300 MG/1
300 CAPSULE ORAL 3 TIMES DAILY
Qty: 15 CAPSULE | Refills: 0 | Status: SHIPPED | OUTPATIENT
Start: 2022-02-16 | End: 2023-02-06

## 2022-02-16 NOTE — TELEPHONE ENCOUNTER
Pt states she only has 2 abx pills left  And the cyst is draining but very slow   And she is applying heat     But she is requesting more antibiotics

## 2022-02-16 NOTE — TELEPHONE ENCOUNTER
----- Message from Mariel Jose sent at 2/16/2022 11:33 AM CST -----  Contact: [phone]  Who Called: PT  Regarding: rx questions    Would the patient rather a call back or a response via [facility]? Call back  Best Call Back Number: [phone]   Additional Information:

## 2022-02-22 NOTE — TELEPHONE ENCOUNTER
Care Due:                  Date            Visit Type   Department     Provider  --------------------------------------------------------------------------------                                ESTABLISHED  Last Visit: 11-      PATIENT      None Found     Ra Vargas  Next Visit: None Scheduled  None         None Found                                                            Last  Test          Frequency    Reason                     Performed    Due Date  --------------------------------------------------------------------------------    HBA1C.......  6 months...  LANTUS, JAMESTOZA, insulin.  11- 05-    Lipid Panel.  12 months..  pravastatin..............  03- 03-    Powered by [employer] by [employer]. Reference number: [id number].   2/22/2022 9:16:18 AM CST

## 2022-02-24 RX ORDER — LANCETS 33 GAUGE
EACH MISCELLANEOUS
Refills: 0 | OUTPATIENT
Start: 2022-02-24

## 2022-02-24 NOTE — TELEPHONE ENCOUNTER
[facility] Note  Quick DC. Inappropriate Request   Refill request requires further review by MD: NO   Medication Therapy Plan: Pharmacy is requesting new script(s) for the following medications without required information, (sig/ frequency/qty/etc)     [facility] action(s):  Quick Discontinue      Encounter Details:    Pharmacies have been requesting medications for patients without required information, (sig, frequency, qty, etc.). In addition, requests are sent for medication(s) pt. are currently not taking, and medications patients have never taken.    We have spoken to the pharmacies about these request types and advised their teams previously that we are unable to assess these New Script requests and require all details for these requests. This is a known issue and has been reported.       Automatic Epic Generated Protocol Data Below:   Requested Prescriptions     Pending Prescriptions Disp Refills    lancets (TRUEPLUS LANCETS) 33 gauge Misc [Pharmacy Med Name: TRUEPLUS 33G LANCETS]  0            Appointments      Date Provider   Last Visit   11/5/2021 Ra Vargas MD   Next Visit   Visit date not found Ra Vargas MD        Note composed:8:32 AM 02/24/2022

## 2022-03-03 DIAGNOSIS — E11.22 TYPE 2 DIABETES MELLITUS WITH STAGE 4 CHRONIC KIDNEY DISEASE, WITH LONG-TERM CURRENT USE OF INSULIN: ICD-10-CM

## 2022-03-03 DIAGNOSIS — Z79.4 TYPE 2 DIABETES MELLITUS WITH STAGE 4 CHRONIC KIDNEY DISEASE, WITH LONG-TERM CURRENT USE OF INSULIN: ICD-10-CM

## 2022-03-03 DIAGNOSIS — N18.4 TYPE 2 DIABETES MELLITUS WITH STAGE 4 CHRONIC KIDNEY DISEASE, WITH LONG-TERM CURRENT USE OF INSULIN: ICD-10-CM

## 2022-03-03 NOTE — TELEPHONE ENCOUNTER
No new care gaps identified.  Powered by [employer] by [employer]. Reference number: [id number].   3/03/2022 12:00:48 AM CST

## 2022-03-08 NOTE — TELEPHONE ENCOUNTER
No new care gaps identified.  Powered by [employer] by [employer]. Reference number: [id number].   3/08/2022 3:36:19 PM CST

## 2022-03-09 ENCOUNTER — TELEPHONE (OUTPATIENT)
Dept: FAMILY MEDICINE | Facility: CLINIC | Age: 77
End: 2022-03-09
Payer: MEDICARE

## 2022-03-09 DIAGNOSIS — N18.4 CKD (CHRONIC KIDNEY DISEASE), STAGE IV: Primary | ICD-10-CM

## 2022-03-09 DIAGNOSIS — N18.4 TYPE 2 DIABETES MELLITUS WITH STAGE 4 CHRONIC KIDNEY DISEASE, WITH LONG-TERM CURRENT USE OF INSULIN: ICD-10-CM

## 2022-03-09 DIAGNOSIS — E11.22 TYPE 2 DIABETES MELLITUS WITH STAGE 4 CHRONIC KIDNEY DISEASE, WITH LONG-TERM CURRENT USE OF INSULIN: ICD-10-CM

## 2022-03-09 DIAGNOSIS — Z79.4 TYPE 2 DIABETES MELLITUS WITH STAGE 4 CHRONIC KIDNEY DISEASE, WITH LONG-TERM CURRENT USE OF INSULIN: ICD-10-CM

## 2022-03-09 DIAGNOSIS — M25.559 HIP PAIN: Primary | ICD-10-CM

## 2022-03-09 RX ORDER — LANCETS
EACH MISCELLANEOUS
Qty: 200 EACH | Refills: 3 | Status: SHIPPED | OUTPATIENT
Start: 2022-03-09

## 2022-03-09 NOTE — TELEPHONE ENCOUNTER
No new care gaps identified.  Powered by [employer] by [employer] Reference number: [id number].   3/09/2022 2:22:54 AM CST

## 2022-03-09 NOTE — TELEPHONE ENCOUNTER
Refill Authorization Note   Yajaira Terry  is requesting a refill authorization.  Brief Assessment and Rationale for Refill:  Approve     Medication Therapy Plan:       Medication Reconciliation Completed: No   Comments:   --->Care Gap information included below if applicable.   Orders Placed This Encounter    blood sugar diagnostic (TRUE METRIX GLUCOSE TEST STRIP) Strp    lancets Misc      Requested Prescriptions   Signed Prescriptions Disp Refills    blood sugar diagnostic (TRUE METRIX GLUCOSE TEST STRIP) Strp 200 strip 3     Sig: To check BG 2 times daily, to use with insurance preferred meter       Endocrinology: Diabetes - Supplies Passed - 3/3/2022 12:00 AM        Passed - Patient is at least 18 years old        Passed - Valid encounter within last 15 months     Recent Visits  Date Type Provider Dept   11/05/21 Office Visit Ra Vargas MD [facility]   06/15/21 Office Visit Ra Vargas MD [facility]   03/26/21 Office Visit Ra Vargas MD [facility]   03/20/20 Office Visit Wil Maya MD [facility]   Showing recent visits within past 720 days and meeting all other requirements  Future Appointments  No visits were found meeting these conditions.  Showing future appointments within next 150 days and meeting all other requirements      Future Appointments              Tomorrow SAME DAY LAB, [facility] - Lab, [facility]    In 2 weeks MD Mario Hope - Cardiology, Mario                Passed - HBA1C within 1080 days     Lab Results   Component Value Date    HGBA1C 6.5 (H) 11/05/2021    HGBA1C 6.6 (H) 03/29/2021    HGBA1C 8.0 (H) 11/12/2020                lancets Misc 200 each 3     Sig: To check BG 2 times daily, to use with insurance preferred meter       There is no refill protocol information for this order         Appointments  past 12m or future 3m with PCP    Date Provider   Last Visit   11/5/2021 Ra Vargas MD    Next Visit   3/8/2022 Ra Vargas MD   ED visits in past 90 days: 0     Note composed:5:14 PM 03/09/2022

## 2022-03-09 NOTE — TELEPHONE ENCOUNTER
----- Message from Tara Forman sent at 3/9/2022  2:26 PM CST -----  Contact: [phone]/ Self  Type: Requesting to speak with nurse    Who Called: Pt   Regarding: ask why MD had declined a refill for lancets Misc  Would the patient rather a call back or a response via [facility]? Call back  Best Call Back Number: [phone]  Additional Information: [facility]

## 2022-03-09 NOTE — TELEPHONE ENCOUNTER
Pt is asking that you refill her lancets to [facility] pharmacy- looks like the refill clinic declined this rx last week. Pt is also having labs drawn tomorrow for coumadin clinic. Would like to know if you need labs on her too. If so, we will link your orders to the coumadin clinic's orders.

## 2022-03-10 RX ORDER — CALCIUM CITRATE/VITAMIN D3 200MG-6.25
TABLET ORAL
Qty: 200 STRIP | Refills: 0 | Status: SHIPPED | OUTPATIENT
Start: 2022-03-10

## 2022-03-10 NOTE — TELEPHONE ENCOUNTER
She wants to do something/test  To find out why she has the pain prior to doing any PT    She fell on on her face 3 months ago   And which caused pain in her hip    Also Fell along time ago on and fx her back    Several weeks ago she bent over in the fridge and it felt like her hip popped out of socket  This is where the pain is . She has Pain getting into car  Having pain to swing leg into car and into bed    Pain with walking

## 2022-03-10 NOTE — TELEPHONE ENCOUNTER
----- Message from Dasha Robb sent at 3/10/2022  2:12 PM CST -----  Regarding: call back  Contact: [phone]  Who Called: PT     Patient is calling to talk to nurse in regards to her labs for today on her A1C patient says she was not told to fast for the test patient has a 2 week of readings would Dr. Knapp accept the log or does she have to have the test done.

## 2022-03-10 NOTE — TELEPHONE ENCOUNTER
Spoke with pt she stated that she has been having a lot of leg pain that travels to her hip pt decline next available with  and would like to know what  suggest

## 2022-03-11 ENCOUNTER — ANTI-COAG VISIT (OUTPATIENT)
Dept: CARDIOLOGY | Facility: CLINIC | Age: 77
End: 2022-03-11
Payer: MEDICARE

## 2022-03-11 ENCOUNTER — HOSPITAL ENCOUNTER (OUTPATIENT)
Dept: RADIOLOGY | Facility: HOSPITAL | Age: 77
Discharge: HOME | End: 2022-03-11
Attending: STUDENT IN AN ORGANIZED HEALTH CARE EDUCATION/TRAINING PROGRAM
Payer: MEDICARE

## 2022-03-11 ENCOUNTER — TELEPHONE (OUTPATIENT)
Dept: FAMILY MEDICINE | Facility: CLINIC | Age: 77
End: 2022-03-11
Payer: MEDICARE

## 2022-03-11 DIAGNOSIS — M25.559 HIP PAIN: ICD-10-CM

## 2022-03-11 DIAGNOSIS — I48.91 ATRIAL FIBRILLATION WITH RVR: Primary | ICD-10-CM

## 2022-03-11 DIAGNOSIS — Z79.01 LONG TERM (CURRENT) USE OF ANTICOAGULANTS: ICD-10-CM

## 2022-03-11 DIAGNOSIS — M25.559 HIP PAIN: Primary | ICD-10-CM

## 2022-03-11 PROCEDURE — 99211 OFF/OP EST MAY X REQ PHY/QHP: CPT | Mod: S$GLB,,, | Performed by: STUDENT IN AN ORGANIZED HEALTH CARE EDUCATION/TRAINING PROGRAM

## 2022-03-11 PROCEDURE — 73521 X-RAY EXAM HIPS BI 2 VIEWS: CPT | Mod: TC,FY,PO

## 2022-03-11 PROCEDURE — 99211 PR OFFICE/OUTPT VISIT, EST, LEVL I: ICD-10-PCS | Mod: S$GLB,,, | Performed by: STUDENT IN AN ORGANIZED HEALTH CARE EDUCATION/TRAINING PROGRAM

## 2022-03-11 NOTE — TELEPHONE ENCOUNTER
----- Message from Najma Rush sent at 3/11/2022  4:09 PM CST -----  Type: Requesting to speak with nurse         Who Called: PT  Regarding: Looking for xray results please advise   Would the patient rather a call back or a response via [facility]? Call back  Best Call Back Number: [phone]  Additional Information: n/a

## 2022-03-11 NOTE — TELEPHONE ENCOUNTER
Cheryl Knapp Staff  Caller: Unspecified (Today,  4:18 PM)  Type:  Patient Returning Call     Who Called: Pt   Who Left Message for Patient: Alesha   Does the patient know what this is regarding?: yes   Would the patient rather a call back or a response via [facility]? Call back   Best Call Back Number: [phone]   Additional Information:

## 2022-03-15 ENCOUNTER — TELEPHONE (OUTPATIENT)
Dept: FAMILY MEDICINE | Facility: CLINIC | Age: 77
End: 2022-03-15
Payer: MEDICARE

## 2022-03-15 NOTE — TELEPHONE ENCOUNTER
----- Message from Ra Vargas MD sent at 3/15/2022  8:43 AM CDT -----  Your bloodwork looks fine and does not require any change in treatment.     Please contact me if you have any additional concerns.    Sincerely,    Ra Vargas MD

## 2022-03-16 RX ORDER — LANCETS 33 GAUGE
EACH MISCELLANEOUS
Refills: 0 | OUTPATIENT
Start: 2022-03-16

## 2022-03-16 NOTE — TELEPHONE ENCOUNTER
[facility] Note  Quick DC. Inappropriate Request   Refill request requires further review by MD: NO   Medication Therapy Plan: Pharmacy is requesting new script(s) for the following medications without required information, (sig/ frequency/qty/etc)     [facility] action(s):  Quick Discontinue      Encounter Details:    Pharmacies have been requesting medications for patients without required information, (sig, frequency, qty, etc.). In addition, requests are sent for medication(s) pt. are currently not taking, and medications patients have never taken.    We have spoken to the pharmacies about these request types and advised their teams previously that we are unable to assess these New Script requests and require all details for these requests. This is a known issue and has been reported.       Automatic Epic Generated Protocol Data Below:   Requested Prescriptions     Pending Prescriptions Disp Refills    lancets (TRUEPLUS LANCETS) 33 gauge Misc [Pharmacy Med Name: TRUEPLUS 33G LANCETS]  0            Appointments      Date Provider   Last Visit   11/5/2021 Ra Vargas MD   Next Visit   3/9/2022 Ra Vargas MD        Note composed:5:38 PM 03/16/2022

## 2022-03-22 RX ORDER — PRAVASTATIN SODIUM 40 MG/1
40 TABLET ORAL NIGHTLY
Qty: 90 TABLET | Refills: 3 | Status: SHIPPED | OUTPATIENT
Start: 2022-03-22 | End: 2022-12-27 | Stop reason: SDUPTHER

## 2022-03-22 NOTE — TELEPHONE ENCOUNTER
No new care gaps identified.  Powered by [employer]. Reference number: [id number].   3/22/2022 3:37:44 PM CDT

## 2022-03-22 NOTE — TELEPHONE ENCOUNTER
----- Message from Diane Castorena sent at 3/22/2022  3:34 PM CDT -----  Regarding: Rx refill  Received refill request via fax from [facility] for 90 days with refills for  pravastatin (PRAVACHOL) 40 MG tablet

## 2022-03-22 NOTE — TELEPHONE ENCOUNTER
----- Message from Diane Castorena sent at 3/22/2022  3:34 PM CDT -----  Regarding: Rx refill  Received refill request via fax from [facility] for 90 days with refills for  pravastatin (PRAVACHOL) 40 MG tablet

## 2022-03-24 ENCOUNTER — TELEPHONE (OUTPATIENT)
Dept: ADMINISTRATIVE | Facility: OTHER | Age: 77
End: 2022-03-24
Payer: MEDICARE

## 2022-03-29 ENCOUNTER — OFFICE VISIT (OUTPATIENT)
Dept: CARDIOLOGY | Facility: CLINIC | Age: 77
End: 2022-03-29
Payer: MEDICARE

## 2022-03-29 ENCOUNTER — PATIENT OUTREACH (OUTPATIENT)
Dept: ADMINISTRATIVE | Facility: OTHER | Age: 77
End: 2022-03-29
Payer: MEDICARE

## 2022-03-29 ENCOUNTER — CLINICAL SUPPORT (OUTPATIENT)
Dept: REHABILITATION | Facility: HOSPITAL | Age: 77
End: 2022-03-29
Attending: STUDENT IN AN ORGANIZED HEALTH CARE EDUCATION/TRAINING PROGRAM
Payer: MEDICARE

## 2022-03-29 VITALS
HEART RATE: 81 BPM | WEIGHT: 215.88 LBS | SYSTOLIC BLOOD PRESSURE: 106 MMHG | OXYGEN SATURATION: 96 % | DIASTOLIC BLOOD PRESSURE: 60 MMHG | HEIGHT: 64 IN | BODY MASS INDEX: 36.85 KG/M2

## 2022-03-29 DIAGNOSIS — M25.559 HIP PAIN: ICD-10-CM

## 2022-03-29 DIAGNOSIS — N18.4 TYPE 2 DIABETES MELLITUS WITH STAGE 4 CHRONIC KIDNEY DISEASE, WITH LONG-TERM CURRENT USE OF INSULIN: ICD-10-CM

## 2022-03-29 DIAGNOSIS — Z79.4 TYPE 2 DIABETES MELLITUS WITH STAGE 4 CHRONIC KIDNEY DISEASE, WITH LONG-TERM CURRENT USE OF INSULIN: ICD-10-CM

## 2022-03-29 DIAGNOSIS — Z91.81 AT RISK FOR FALLS: ICD-10-CM

## 2022-03-29 DIAGNOSIS — R26.89 BALANCE PROBLEMS: ICD-10-CM

## 2022-03-29 DIAGNOSIS — I48.91 ATRIAL FIBRILLATION, UNSPECIFIED TYPE: Primary | ICD-10-CM

## 2022-03-29 DIAGNOSIS — E11.22 TYPE 2 DIABETES MELLITUS WITH STAGE 4 CHRONIC KIDNEY DISEASE, WITH LONG-TERM CURRENT USE OF INSULIN: ICD-10-CM

## 2022-03-29 DIAGNOSIS — E78.5 HYPERLIPIDEMIA ASSOCIATED WITH TYPE 2 DIABETES MELLITUS: ICD-10-CM

## 2022-03-29 DIAGNOSIS — R29.898 WEAKNESS OF BOTH LOWER EXTREMITIES: ICD-10-CM

## 2022-03-29 DIAGNOSIS — E11.69 HYPERLIPIDEMIA ASSOCIATED WITH TYPE 2 DIABETES MELLITUS: ICD-10-CM

## 2022-03-29 DIAGNOSIS — I50.20 HEART FAILURE WITH REDUCED EJECTION FRACTION: ICD-10-CM

## 2022-03-29 PROCEDURE — 99999 PR PBB SHADOW E&M-EST. PATIENT-LVL III: ICD-10-PCS | Mod: PBBFAC,,, | Performed by: INTERNAL MEDICINE

## 2022-03-29 PROCEDURE — 99213 OFFICE O/P EST LOW 20 MIN: CPT | Mod: PBBFAC,PO | Performed by: INTERNAL MEDICINE

## 2022-03-29 PROCEDURE — 99499 RISK ADDL DX/OHS AUDIT: ICD-10-PCS | Mod: HCNC,S$GLB,, | Performed by: INTERNAL MEDICINE

## 2022-03-29 PROCEDURE — 3288F FALL RISK ASSESSMENT DOCD: CPT | Mod: CPTII,S$GLB,, | Performed by: INTERNAL MEDICINE

## 2022-03-29 PROCEDURE — 99215 PR OFFICE/OUTPT VISIT, EST, LEVL V, 40-54 MIN: ICD-10-PCS | Mod: S$GLB,,, | Performed by: INTERNAL MEDICINE

## 2022-03-29 PROCEDURE — 1101F PR PT FALLS ASSESS DOC 0-1 FALLS W/OUT INJ PAST YR: ICD-10-PCS | Mod: CPTII,S$GLB,, | Performed by: INTERNAL MEDICINE

## 2022-03-29 PROCEDURE — 1126F PR PAIN SEVERITY QUANTIFIED, NO PAIN PRESENT: ICD-10-PCS | Mod: CPTII,S$GLB,, | Performed by: INTERNAL MEDICINE

## 2022-03-29 PROCEDURE — 1159F MED LIST DOCD IN RCRD: CPT | Mod: CPTII,S$GLB,, | Performed by: INTERNAL MEDICINE

## 2022-03-29 PROCEDURE — 1160F PR REVIEW ALL MEDS BY PRESCRIBER/CLIN PHARMACIST DOCUMENTED: ICD-10-PCS | Mod: CPTII,,, | Performed by: INTERNAL MEDICINE

## 2022-03-29 PROCEDURE — 1126F AMNT PAIN NOTED NONE PRSNT: CPT | Mod: CPTII,S$GLB,, | Performed by: INTERNAL MEDICINE

## 2022-03-29 PROCEDURE — 3288F PR FALLS RISK ASSESSMENT DOCUMENTED: ICD-10-PCS | Mod: CPTII,S$GLB,, | Performed by: INTERNAL MEDICINE

## 2022-03-29 PROCEDURE — 3078F DIAST BP <80 MM HG: CPT | Mod: CPTII,S$GLB,, | Performed by: INTERNAL MEDICINE

## 2022-03-29 PROCEDURE — 99999 PR PBB SHADOW E&M-EST. PATIENT-LVL III: CPT | Mod: PBBFAC,,, | Performed by: INTERNAL MEDICINE

## 2022-03-29 PROCEDURE — 1160F RVW MEDS BY RX/DR IN RCRD: CPT | Mod: CPTII,,, | Performed by: INTERNAL MEDICINE

## 2022-03-29 PROCEDURE — 99499 UNLISTED E&M SERVICE: CPT | Mod: HCNC,S$GLB,, | Performed by: INTERNAL MEDICINE

## 2022-03-29 PROCEDURE — 1101F PT FALLS ASSESS-DOCD LE1/YR: CPT | Mod: CPTII,S$GLB,, | Performed by: INTERNAL MEDICINE

## 2022-03-29 PROCEDURE — 97163 PT EVAL HIGH COMPLEX 45 MIN: CPT | Mod: PO

## 2022-03-29 PROCEDURE — 3078F PR MOST RECENT DIASTOLIC BLOOD PRESSURE < 80 MM HG: ICD-10-PCS | Mod: CPTII,S$GLB,, | Performed by: INTERNAL MEDICINE

## 2022-03-29 PROCEDURE — 3074F SYST BP LT 130 MM HG: CPT | Mod: CPTII,S$GLB,, | Performed by: INTERNAL MEDICINE

## 2022-03-29 PROCEDURE — 1159F PR MEDICATION LIST DOCUMENTED IN MEDICAL RECORD: ICD-10-PCS | Mod: CPTII,S$GLB,, | Performed by: INTERNAL MEDICINE

## 2022-03-29 PROCEDURE — 3051F PR MOST RECENT HEMOGLOBIN A1C LEVEL 7.0 - < 8.0%: ICD-10-PCS | Mod: CPTII,S$GLB,, | Performed by: INTERNAL MEDICINE

## 2022-03-29 PROCEDURE — 3074F PR MOST RECENT SYSTOLIC BLOOD PRESSURE < 130 MM HG: ICD-10-PCS | Mod: CPTII,S$GLB,, | Performed by: INTERNAL MEDICINE

## 2022-03-29 PROCEDURE — 99215 OFFICE O/P EST HI 40 MIN: CPT | Mod: S$GLB,,, | Performed by: INTERNAL MEDICINE

## 2022-03-29 PROCEDURE — 3051F HG A1C>EQUAL 7.0%<8.0%: CPT | Mod: CPTII,S$GLB,, | Performed by: INTERNAL MEDICINE

## 2022-03-29 RX ORDER — WARFARIN 3 MG/1
TABLET ORAL
COMMUNITY
Start: 2022-02-16 | End: 2023-01-12 | Stop reason: SDUPTHER

## 2022-03-29 RX ORDER — SODIUM CHLORIDE 0.9 % (FLUSH) 0.9 %
10 SYRINGE (ML) INJECTION
Status: DISCONTINUED | OUTPATIENT
Start: 2022-03-29 | End: 2022-04-04 | Stop reason: HOSPADM

## 2022-03-29 RX ORDER — LANCETS 33 GAUGE
EACH MISCELLANEOUS
COMMUNITY
Start: 2022-03-11

## 2022-03-29 NOTE — PROGRESS NOTES
Health Maintenance Due   Topic Date Due    COVID-19 Vaccine (1) Never done    Influenza Vaccine (1) 09/01/2021    Foot Exam  03/26/2022    Lipid Panel  03/29/2022     Updates were requested from care everywhere.  Chart was reviewed for overdue [facility]active [facility] Encounters ([facility]) topics (CRS, Breast Cancer Screening, Eye exam)  Health Maintenance has been updated.  LINKS immunization registry triggered.  Immunizations were reconciled.

## 2022-03-29 NOTE — H&P (VIEW-ONLY)
Cardiology Clinic note    Subjective:   Patient ID:  Yajaira Terry is a 76 y.o. female who presents for AFib FUP    HPI:   HFrEF: Endorses some orthopnea. LE swelling improving- only takes furosemide takes x1-2/week now (hesitant d/t urination). Weight 220 lbs - up from 6 months; 15 lbs. Following salt restriction    Afib: No palpitations, syncope or near syncope    DM: Managed with PCP    SH Tobacco Quit 2010  FH No premature CAD    Patient Active Problem List    Diagnosis Date Noted    Primary osteoarthritis of left knee 07/01/2021    Primary osteoarthritis of right knee 06/25/2021    Class 2 severe obesity due to excess calories with serious comorbidity and body mass index (BMI) of 35.0 to 35.9 in adult 06/16/2021    History of renal cell carcinoma 06/16/2021    Long term (current) use of anticoagulants 02/28/2020    CKD (chronic kidney disease), stage IV 02/27/2020     - history of unilateral nephrectomy due to renal cell carcinoma by Dr Emilia Lenz at [facility] (urology)      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 02/27/2020    Age-related osteoporosis without current pathological fracture 02/27/2020    Atrial fibrillation with RVR 11/23/2019    Chronic HFrEF (heart failure with reduced ejection fraction) 11/23/2019    H/O right nephrectomy 11/23/2019    Type 2 diabetes mellitus, with long-term current use of insulin 11/23/2019    Normocytic anemia 11/23/2019    Mitral regurgitation 11/23/2019    Tricuspid regurgitation 11/23/2019       Patient's Medications   New Prescriptions    No medications on file   Previous Medications    ACETAMINOPHEN (TYLENOL) 325 MG TABLET    Take 325 mg by mouth every 6 (six) hours as needed for Pain.    ALENDRONATE (FOSAMAX) 35 MG TABLET    Take 35 mg by mouth once a week.    AMIODARONE (PACERONE) 200 MG TAB    TAKE 1 TABLET BY MOUTH EVERY DAY    BLOOD SUGAR DIAGNOSTIC (TRUE METRIX GLUCOSE TEST STRIP) STRP    To  "check BG 2 times daily, to use with insurance preferred meter    BLOOD-GLUCOSE METER (TRUE METRIX GLUCOSE METER) MISC    1 each by Misc.(Non-Drug; Combo Route) route as needed (to check blood sugar).    BLOOD-GLUCOSE METER KIT    To check BG 2 times daily, to use with insurance preferred meter    CHOLECALCIFEROL, VITAMIN D3, 25 MCG (1,000 UNIT) CHEW    Take by mouth 2 (two) times a day.    CLINDAMYCIN (CLEOCIN) 300 MG CAPSULE    Take 1 capsule (300 mg total) by mouth 3 (three) times daily.    COMBIGAN 0.2-0.5 % DROP    Place 1 drop into both eyes 2 (two) times daily.    DOCUSATE SODIUM (COLACE) 100 MG CAPSULE    Take 100 mg by mouth 2 (two) times daily.    FUROSEMIDE (LASIX) 20 MG TABLET    TAKE 1 TABLET BY MOUTH TWICE A DAY    INSULIN ASPART U-100 (NOVOLOG FLEXPEN U-100 INSULIN) 100 UNIT/ML (3 ML) INPN PEN    INJECT 15 UNITS UNDER THE SKIN THREE TIMES DAILY WITH MEALS.    LANCETS MISC    To check BG 2 times daily, to use with insurance preferred meter    LANTUS SOLOSTAR U-100 INSULIN GLARGINE 100 UNITS/ML (3ML) SUBQ PEN    INJECT 60 UNITS SUBCUTANEOUSLY ONCE DAILY.    LATANOPROST 0.005 % OPHTHALMIC SOLUTION    PLACE 1 DROP INTO LEFT EYE AT BEDTIME    METOPROLOL SUCCINATE (TOPROL-XL) 100 MG 24 HR TABLET    Take 1 tablet (100 mg total) by mouth once daily.    PEN NEEDLE, DIABETIC 33 GAUGE X 5/16" NDLE    1 Units by Misc.(Non-Drug; Combo Route) route 3 (three) times daily as needed (to inject insulin).    PRAVASTATIN (PRAVACHOL) 40 MG TABLET    Take 1 tablet (40 mg total) by mouth every evening.    TRAMADOL (ULTRAM) 50 MG TABLET    Take 1 tablet (50 mg total) by mouth every 8 (eight) hours as needed for Pain.    TRUE METRIX GLUCOSE TEST STRIP STRP    TEST BLOOD SUGAR TWICE DAILY    TRUEPLUS LANCETS 33 GAUGE MISC        VICTOZA 2-PENELOPE 0.6 MG/0.1 ML (18 MG/3 ML) PNIJ PEN    INJECT 0.6 MG UNDER THE SKIN ONCE DAILY    WARFARIN (COUMADIN) 3 MG TABLET    TAKE ONE TABLET BY MOUTH DAILY ALTERNATING WITH A HALF OF TABLET " "  Modified Medications    No medications on file   Discontinued Medications    No medications on file        Review of Systems   Constitutional: Negative for fever.   HENT: Negative for nosebleeds.    Cardiovascular: Negative for chest pain, near-syncope, palpitations, paroxysmal nocturnal dyspnea and syncope.        As above   Respiratory: Negative for hemoptysis.    Hematologic/Lymphatic: Negative for bleeding problem.   Musculoskeletal: Positive for arthritis.   Gastrointestinal: Negative for hematochezia.   Genitourinary: Negative for hematuria.   Neurological: Negative for seizures.   Allergic/Immunologic: Positive for environmental allergies.         Objective:   Vitals  Vitals:    03/29/22 1515   BP: 106/60   Pulse: 81   SpO2: 96%   Weight: 97.9 kg (215 lb 14.4 oz)   Height: 5' 4" (1.626 m)          Physical Exam  Constitutional:       General: She is not in acute distress.     Appearance: She is well-developed. She is not diaphoretic.   HENT:      Head: Normocephalic.   Neck:      Vascular: JVD: Unable to assess d/t body habitus.   Cardiovascular:      Rate and Rhythm: Normal rate. Rhythm irregularly irregular.      Heart sounds: No murmur heard.    No friction rub. No gallop.   Pulmonary:      Effort: Pulmonary effort is normal. No respiratory distress.      Breath sounds: Normal breath sounds.   Abdominal:      Palpations: Abdomen is soft.      Tenderness: There is no abdominal tenderness.   Musculoskeletal:         General: Swelling (L>R) present.      Cervical back: Normal range of motion.   Skin:     General: Skin is warm.   Neurological:      Mental Status: She is alert.   Psychiatric:         Mood and Affect: Mood normal.             Assessment:     1. Atrial fibrillation, unspecified type    2. Heart failure with reduced ejection fraction    3. Type 2 diabetes mellitus with stage 4 chronic kidney disease, with long-term current use of insulin    4. Hyperlipidemia associated with type 2 diabetes " "mellitus        Plan:   Yajaira Terry is a 76 y.o. female h/o Afib, HFrEF, DM, HLD    - EKG personally reviewed. My interpretation:  8/24/21: Afib 70s. STD lateral leads (similar to prior EKGs)    Atrial Fibrillation  - CHADS2-VASc 5 (CHF, Age x2, DM, female)  - HASBLED 1 (Age)  - Coumadin. Followed by coumadin clinic  Estimated body mass index is 37.06 kg/m² as calculated from the following:    Height as of this encounter: 5' 4" (1.626 m).    Weight as of this encounter: 97.9 kg (215 lb 14.4 oz).  76 y.o.  Lab Results   Component Value Date    CREATININE 1.47 (H) 03/11/2022    AST 35 03/11/2022    BILITOT 0.6 03/11/2022   - She reports she does not take Propafenone.  - Amiodarone  - She reports she has been offered DCCV in the past but decided not to pursue. She has agreed most recently however scheduling delayed d/t INR checks. Does not agree to QING. INR has been at goal for last 3 months' readings. Will schedule for DCCV  - Discussed the most likely etiology of her LVEF is likely related to Afib. Declines to see EP    HFrEF  - NYHA II-III  - Echo Oct 2021: LVEF 30%, DD. Normal RVSF. Mild-mod MR. Mild TR. Mod MS. PASP 21, CVP 3  - Echo 2020: LVEF 20-25%. Afib with restrictive filling pattern. GHK, local segmental akinesis of AS-anterior LV wall. Mild LAE. Mild-mod MR, mild TR. PASP 21, CVP 3  - Cath 2017: Normal coronary arteries  - Unable to start ARB/ACEi or hydralazine/ISMN d/t hypotension  - Changed metoprolol tartrate to metoprolol succinate  Lab Results   Component Value Date    CREATININE 1.47 (H) 03/11/2022    K 4.2 03/11/2022   - Furosemide - discussed primarily used for fluid status and concern may be volume up. Discussed importance of compliance. Concerns for recurrent Afib (after DCCV) if hypervolemic  - Daily weights  - First thing in the morning: Pee, take off clothes, step on the scale.  - Write down the date, and the weight. Maintain this chart everyday  - If weight increases by > 3 lbs in a " day, or > 5 lbs in a week, take an extra pill of furosemide. Call the office.  - If worsening symptoms, go to the ED  - Salt restriction    DM  Lab Results   Component Value Date    HGBA1C 7.2 (H) 03/11/2022   Managed by PCP    HLD  Lab Results   Component Value Date    LDLCALC 72.6 03/29/2021   Statin as above      Continue with current medical plan and lifestyle changes.    Orders Placed This Encounter   Procedures    Vital signs     Standing Status:   Standing     Number of Occurrences:   1    Cardiac Monitoring - Adult     Notify Physician If:     Standing Status:   Standing     Number of Occurrences:   1     Order Specific Question:   HR>=     Answer:   120/min     Order Specific Question:   HR<=     Answer:   50/min     Order Specific Question:   PVC>=     Answer:   10/min     Order Specific Question:   ST Segment:     Answer:   Baseline = No Elevation or Depression. Notify MD for changes from baseline.    Full code     Standing Status:   Standing     Number of Occurrences:   1    Case Request-Cath Lab: Cardioversion or Defibrillation     Standing Status:   Standing     Number of Occurrences:   1     Order Specific Question:   CPT Code:     Answer:   ME CARDIOVERSION, ELECTIVE;EXTERN [90162]     Order Specific Question:   Medical Necessity:     Answer:   Medically Urgent [101]     Order Specific Question:   Is an on-site pathologist required for this procedure?     Answer:   N/A       Follow up as scheduled  Return sooner for concerns or questions. If symptoms persist go to the ED    She expressed verbal understanding and agreed with the plan      Anila Patrick MD  Interventional Cardiology  [facility]  Phone: [phone]

## 2022-03-29 NOTE — PROGRESS NOTES
Cardiology Clinic note    Subjective:   Patient ID:  Yajaira Terry is a 76 y.o. female who presents for AFib FUP    HPI:   HFrEF: Endorses some orthopnea. LE swelling improving- only takes furosemide takes x1-2/week now (hesitant d/t urination). Weight 220 lbs - up from 6 months; 15 lbs. Following salt restriction    Afib: No palpitations, syncope or near syncope    DM: Managed with PCP    SH Tobacco Quit 2010  FH No premature CAD    Patient Active Problem List    Diagnosis Date Noted    Primary osteoarthritis of left knee 07/01/2021    Primary osteoarthritis of right knee 06/25/2021    Class 2 severe obesity due to excess calories with serious comorbidity and body mass index (BMI) of 35.0 to 35.9 in adult 06/16/2021    History of renal cell carcinoma 06/16/2021    Long term (current) use of anticoagulants 02/28/2020    CKD (chronic kidney disease), stage IV 02/27/2020     - history of unilateral nephrectomy due to renal cell carcinoma by Dr Emilia Lenz at [facility] (urology)      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 02/27/2020    Age-related osteoporosis without current pathological fracture 02/27/2020    Atrial fibrillation with RVR 11/23/2019    Chronic HFrEF (heart failure with reduced ejection fraction) 11/23/2019    H/O right nephrectomy 11/23/2019    Type 2 diabetes mellitus, with long-term current use of insulin 11/23/2019    Normocytic anemia 11/23/2019    Mitral regurgitation 11/23/2019    Tricuspid regurgitation 11/23/2019       Patient's Medications   New Prescriptions    No medications on file   Previous Medications    ACETAMINOPHEN (TYLENOL) 325 MG TABLET    Take 325 mg by mouth every 6 (six) hours as needed for Pain.    ALENDRONATE (FOSAMAX) 35 MG TABLET    Take 35 mg by mouth once a week.    AMIODARONE (PACERONE) 200 MG TAB    TAKE 1 TABLET BY MOUTH EVERY DAY    BLOOD SUGAR DIAGNOSTIC (TRUE METRIX GLUCOSE TEST STRIP) STRP    To  "check BG 2 times daily, to use with insurance preferred meter    BLOOD-GLUCOSE METER (TRUE METRIX GLUCOSE METER) MISC    1 each by Misc.(Non-Drug; Combo Route) route as needed (to check blood sugar).    BLOOD-GLUCOSE METER KIT    To check BG 2 times daily, to use with insurance preferred meter    CHOLECALCIFEROL, VITAMIN D3, 25 MCG (1,000 UNIT) CHEW    Take by mouth 2 (two) times a day.    CLINDAMYCIN (CLEOCIN) 300 MG CAPSULE    Take 1 capsule (300 mg total) by mouth 3 (three) times daily.    COMBIGAN 0.2-0.5 % DROP    Place 1 drop into both eyes 2 (two) times daily.    DOCUSATE SODIUM (COLACE) 100 MG CAPSULE    Take 100 mg by mouth 2 (two) times daily.    FUROSEMIDE (LASIX) 20 MG TABLET    TAKE 1 TABLET BY MOUTH TWICE A DAY    INSULIN ASPART U-100 (NOVOLOG FLEXPEN U-100 INSULIN) 100 UNIT/ML (3 ML) INPN PEN    INJECT 15 UNITS UNDER THE SKIN THREE TIMES DAILY WITH MEALS.    LANCETS MISC    To check BG 2 times daily, to use with insurance preferred meter    LANTUS SOLOSTAR U-100 INSULIN GLARGINE 100 UNITS/ML (3ML) SUBQ PEN    INJECT 60 UNITS SUBCUTANEOUSLY ONCE DAILY.    LATANOPROST 0.005 % OPHTHALMIC SOLUTION    PLACE 1 DROP INTO LEFT EYE AT BEDTIME    METOPROLOL SUCCINATE (TOPROL-XL) 100 MG 24 HR TABLET    Take 1 tablet (100 mg total) by mouth once daily.    PEN NEEDLE, DIABETIC 33 GAUGE X 5/16" NDLE    1 Units by Misc.(Non-Drug; Combo Route) route 3 (three) times daily as needed (to inject insulin).    PRAVASTATIN (PRAVACHOL) 40 MG TABLET    Take 1 tablet (40 mg total) by mouth every evening.    TRAMADOL (ULTRAM) 50 MG TABLET    Take 1 tablet (50 mg total) by mouth every 8 (eight) hours as needed for Pain.    TRUE METRIX GLUCOSE TEST STRIP STRP    TEST BLOOD SUGAR TWICE DAILY    TRUEPLUS LANCETS 33 GAUGE MISC        VICTOZA 2-PENELOPE 0.6 MG/0.1 ML (18 MG/3 ML) PNIJ PEN    INJECT 0.6 MG UNDER THE SKIN ONCE DAILY    WARFARIN (COUMADIN) 3 MG TABLET    TAKE ONE TABLET BY MOUTH DAILY ALTERNATING WITH A HALF OF TABLET " "  Modified Medications    No medications on file   Discontinued Medications    No medications on file        Review of Systems   Constitutional: Negative for fever.   HENT: Negative for nosebleeds.    Cardiovascular: Negative for chest pain, near-syncope, palpitations, paroxysmal nocturnal dyspnea and syncope.        As above   Respiratory: Negative for hemoptysis.    Hematologic/Lymphatic: Negative for bleeding problem.   Musculoskeletal: Positive for arthritis.   Gastrointestinal: Negative for hematochezia.   Genitourinary: Negative for hematuria.   Neurological: Negative for seizures.   Allergic/Immunologic: Positive for environmental allergies.         Objective:   Vitals  Vitals:    03/29/22 1515   BP: 106/60   Pulse: 81   SpO2: 96%   Weight: 97.9 kg (215 lb 14.4 oz)   Height: 5' 4" (1.626 m)          Physical Exam  Constitutional:       General: She is not in acute distress.     Appearance: She is well-developed. She is not diaphoretic.   HENT:      Head: Normocephalic.   Neck:      Vascular: JVD: Unable to assess d/t body habitus.   Cardiovascular:      Rate and Rhythm: Normal rate. Rhythm irregularly irregular.      Heart sounds: No murmur heard.    No friction rub. No gallop.   Pulmonary:      Effort: Pulmonary effort is normal. No respiratory distress.      Breath sounds: Normal breath sounds.   Abdominal:      Palpations: Abdomen is soft.      Tenderness: There is no abdominal tenderness.   Musculoskeletal:         General: Swelling (L>R) present.      Cervical back: Normal range of motion.   Skin:     General: Skin is warm.   Neurological:      Mental Status: She is alert.   Psychiatric:         Mood and Affect: Mood normal.             Assessment:     1. Atrial fibrillation, unspecified type    2. Heart failure with reduced ejection fraction    3. Type 2 diabetes mellitus with stage 4 chronic kidney disease, with long-term current use of insulin    4. Hyperlipidemia associated with type 2 diabetes " "mellitus        Plan:   Yajaira Terry is a 76 y.o. female h/o Afib, HFrEF, DM, HLD    - EKG personally reviewed. My interpretation:  8/24/21: Afib 70s. STD lateral leads (similar to prior EKGs)    Atrial Fibrillation  - CHADS2-VASc 5 (CHF, Age x2, DM, female)  - HASBLED 1 (Age)  - Coumadin. Followed by coumadin clinic  Estimated body mass index is 37.06 kg/m² as calculated from the following:    Height as of this encounter: 5' 4" (1.626 m).    Weight as of this encounter: 97.9 kg (215 lb 14.4 oz).  76 y.o.  Lab Results   Component Value Date    CREATININE 1.47 (H) 03/11/2022    AST 35 03/11/2022    BILITOT 0.6 03/11/2022   - She reports she does not take Propafenone.  - Amiodarone  - She reports she has been offered DCCV in the past but decided not to pursue. She has agreed most recently however scheduling delayed d/t INR checks. Does not agree to QING. INR has been at goal for last 3 months' readings. Will schedule for DCCV  - Discussed the most likely etiology of her LVEF is likely related to Afib. Declines to see EP    HFrEF  - NYHA II-III  - Echo Oct 2021: LVEF 30%, DD. Normal RVSF. Mild-mod MR. Mild TR. Mod VA. PASP 21, CVP 3  - Echo 2020: LVEF 20-25%. Afib with restrictive filling pattern. GHK, local segmental akinesis of AS-anterior LV wall. Mild LAE. Mild-mod MR, mild TR. PASP 21, CVP 3  - Cath 2017: Normal coronary arteries  - Unable to start ARB/ACEi or hydralazine/ISMN d/t hypotension  - Changed metoprolol tartrate to metoprolol succinate  Lab Results   Component Value Date    CREATININE 1.47 (H) 03/11/2022    K 4.2 03/11/2022   - Furosemide - discussed primarily used for fluid status and concern may be volume up. Discussed importance of compliance. Concerns for recurrent Afib (after DCCV) if hypervolemic  - Daily weights  - First thing in the morning: Pee, take off clothes, step on the scale.  - Write down the date, and the weight. Maintain this chart everyday  - If weight increases by > 3 lbs in a " day, or > 5 lbs in a week, take an extra pill of furosemide. Call the office.  - If worsening symptoms, go to the ED  - Salt restriction    DM  Lab Results   Component Value Date    HGBA1C 7.2 (H) 03/11/2022   Managed by PCP    HLD  Lab Results   Component Value Date    LDLCALC 72.6 03/29/2021   Statin as above      Continue with current medical plan and lifestyle changes.    Orders Placed This Encounter   Procedures    Vital signs     Standing Status:   Standing     Number of Occurrences:   1    Cardiac Monitoring - Adult     Notify Physician If:     Standing Status:   Standing     Number of Occurrences:   1     Order Specific Question:   HR>=     Answer:   120/min     Order Specific Question:   HR<=     Answer:   50/min     Order Specific Question:   PVC>=     Answer:   10/min     Order Specific Question:   ST Segment:     Answer:   Baseline = No Elevation or Depression. Notify MD for changes from baseline.    Full code     Standing Status:   Standing     Number of Occurrences:   1    Case Request-Cath Lab: Cardioversion or Defibrillation     Standing Status:   Standing     Number of Occurrences:   1     Order Specific Question:   CPT Code:     Answer:   IN CARDIOVERSION, ELECTIVE;EXTERN [99042]     Order Specific Question:   Medical Necessity:     Answer:   Medically Urgent [101]     Order Specific Question:   Is an on-site pathologist required for this procedure?     Answer:   N/A       Follow up as scheduled  Return sooner for concerns or questions. If symptoms persist go to the ED    She expressed verbal understanding and agreed with the plan      Anila Patrick MD  Interventional Cardiology  [facility]  Phone: [phone]

## 2022-03-30 PROBLEM — Z91.81 AT RISK FOR FALLS: Status: ACTIVE | Noted: 2022-03-30

## 2022-03-30 PROBLEM — R26.89 BALANCE PROBLEMS: Status: ACTIVE | Noted: 2022-03-30

## 2022-03-30 PROBLEM — M25.559 HIP PAIN: Status: ACTIVE | Noted: 2022-03-30

## 2022-03-30 PROBLEM — R29.898 WEAKNESS OF BOTH LOWER EXTREMITIES: Status: ACTIVE | Noted: 2022-03-30

## 2022-03-30 NOTE — PLAN OF CARE
[facility]   Physical Therapy Initial Evaluation     Date: 3/29/2022   Name: Yajaira Terry  Clinic Number: [id number]    Therapy Diagnosis:   Encounter Diagnoses   Name Primary?    Hip pain     Weakness of both lower extremities     Balance problems     At risk for falls      Physician: Ra Vargas MD    Physician Orders: PT Eval and Treat   Medical Diagnosis from Referral: M25.559 (ICD-10-CM) - Hip pain  Evaluation Date: 3/29/2022  Authorization Period Expiration: 03/11/2023  Plan of Care Expiration: 5/29/2022  Progress Note Due: 4/29/2022  Visit # / Visits authorized: 1/ 1   FOTO: 0/5    Precautions: Standard, Diabetes, CHF and hx of cancer     Time In: 4:34 pm  Time Out: 5:07 pm  Total Appointment Time (timed & untimed codes): 33 minutes      SUBJECTIVE     Date of onset: 3 weeks prior    History of current condition - Yajaira reports: the pain in her hips went away. She states she would like physical therapy to help with her walking. She lives with her  and son. Her  is at work all day and unable to help her much, but her son helps her to do some things around the house. She reports that she fell on concrete about a year ago on her back. Pt expresses that she has a fear of falling after [place] a casino due to tripping on the carpeting.    Pt explains that she has a lot of doctor's appointments and it is hard for her to make it to therapy every 2 weeks, and instead she will be trying to come in once a week for therapy. She also expresses some concerns about the cost of attending therapy (e.g. the co-pay).    Falls: twice: October 2021 and she has been to doctor since but did not report this to her doctor; On concrete approximately 1 year prior.    Imaging, X-ray B hips:    Impression:     1. The joint space of both hips is normal in appearance.  2. There is a mild amount of atherosclerosis.  3. There are multiple oval shaped calcifications projected over the  pelvis.  These are characteristic of phleboliths.    Prior Therapy: No  Social History: lives with their family and lives with their son  Occupation: retired  Prior Level of Function: Mónica (RW)  Current Level of Function: Mónica (RW)    Pain:  Current 1/10 (knee) 2/10 (low back), worst 1/10 (knee), 3/10 (low back), best 0/10 (knee and low back)   Location: L knee, low back  Description: Aching  Aggravating Factors: Standing, Walking and Morning  Easing Factors: injection (for knee - steroid shots provided a couple of months ago); pain medication (tylenol once daily or tramadol when going out of the house infrequently once or twice a week)    Patients goals: walk without LOB     Medical History:   Past Medical History:   Diagnosis Date    Arthritis     Atrial fibrillation     CHF (congestive heart failure)     CKD (chronic kidney disease) stage 4, GFR 15-29 ml/min     Congenital heart disease     Coronary artery disease     Diabetes mellitus     Diabetes mellitus, type 2     Dyslipidemia     Fractures     Hematuria     Hyperphosphatemia     Nephrolithiasis     Osteoporosis, unspecified     Proteinuria     Renal cell carcinoma of right kidney     Solitary left kidney     Tobacco use     Urinary tract infection     Vaginal infection        Surgical History:   Yajaira Terry  has a past surgical history that includes Incontinence surgery; Hysterectomy;  section; Knee surgery; Laparoscopic robot-assisted surgical removal of kidney using da Taylor Xi (Right); Appendectomy; Wrist fracture surgery; Hand surgery; and Elbow surgery.    Medications:   Yajaira has a current medication list which includes the following prescription(s): acetaminophen, alendronate, amiodarone, blood sugar diagnostic, blood-glucose meter, blood-glucose meter, cholecalciferol (vitamin d3), clindamycin, combigan, docusate sodium, furosemide, insulin aspart u-100, lancets, lantus solostar u-100 insulin, latanoprost,  metoprolol succinate, pen needle, diabetic, pravastatin, tramadol, true metrix glucose test strip, trueplus lancets, victoza 2-malka, and warfarin, and the following Facility-Administered Medications: sodium chloride 0.9%.    Allergies:   Review of patient's allergies indicates:   Allergen Reactions    Sulfa (sulfonamide antibiotics)           OBJECTIVE     Observation: Pt is 77 yo F presenting to therapy in no apparent distress    Posture: Unable to maintain standing posture without support.    Gait: Significant challenges ambulating into clinic with multiple LOB requiring HHA from therapist, VCs and TCs, as well as using various equipment and the walls to hold onto in order to ambulate.    Lower Extremity Strength - pt has pain provocation with movement R hip (2/10) with pt stating that she felt a shooting pain in her leg while performing MMT.       Right LE  Left LE    Knee extension: 4/5* Knee extension: 4/5*   Knee flexion: 4-/5* Knee flexion: 4-/5*   Hip flexion: 3/5 Hip flexion: 3/5   Hip Internal Rotation:  4-/5    Hip Internal Rotation: 4-/5      Hip External Rotation: 4/5    Hip External Rotation: 4/5      Hip extension:  NT Hip extension: NT   Hip abduction: 4-/5 Hip abduction: 4-/5   Hip adduction: 4-/5 Hip adduction 4-/5   Ankle dorsiflexion: NT Ankle dorsiflexion: NT   Ankle plantarflexion: NT Ankle plantarflexion: NT       Palpation: TTP with light touch on B hamstrings    Sensation: increased sensitivity BLEs    Edema: BLEs with L ankle more swollen than R. Pt has a history of CHF        Limitation/Restriction for FOTO Hip Survey    Therapist reviewed FOTO scores for Yajaira Terry on 3/29/2022.   FOTO documents entered into [employer] - see Media section.    Limitation Score: 50%         TREATMENT     Total Treatment time (time-based codes) separate from Evaluation: 0 minutes      Yajaira received the treatments listed below:      therapeutic exercises to develop strength, endurance, ROM, flexibility,  posture and core stabilization for 0 minutes including:    Date 3/29/2022   Visit 1/1   POC exp 5/29/2022   PN 4/29/2022   FTF 4/29/2022   FOTO 0/5           Bike/NuStep    Mat:    HS str    QS    SAQ    SLR    Bridges    Hip ABD    Hip ADD        Seated:    LAQs    Seated Hip Flex    Ball 3-way        Standing:    Hip Abd    Hip Flex    Hip Ext    HS Curls    Tandem stance            Seen by: EG       therapeutic activities to improve functional performance for 0 minutes, including:  - sit to stands    gait training to improve functional mobility and safety for 0 minutes, including:  - ambulation for 2MWT with RW      PATIENT EDUCATION AND HOME EXERCISES     Education provided:   - importance of consistent performance of HEP  - role of PT/PTA    Written Home Exercises Provided: yes. Exercises were reviewed and Yajaira was able to demonstrate them prior to the end of the session.  Yajaira demonstrated good  understanding of the education provided. See EMR under Patient Instructions for exercises provided during therapy sessions.    ASSESSMENT     Yajaira is a 76 y.o. female referred to outpatient Physical Therapy with a medical diagnosis of hip pain. Patient presents over 15 minutes late with no RW and multiple significant LOB with ambulation for 10-15ft into the clinic. Pt displays significant balance deficits along with decreased core stability, along with decreased BLE strength and pain with light touch on B hamstrings. Pt has low tolerance for performance of tests and measurements and PT will continue to assess throughout POC.     Patient prognosis is Fair.   Patient will benefit from skilled outpatient Physical Therapy to address the deficits stated above and in the chart below, provide patient /family education, and to maximize patient's level of independence.     Plan of care discussed with patient: Yes  Patient's spiritual, cultural and educational needs considered and patient is agreeable to the plan of  care and goals as stated below:     Anticipated Barriers for therapy: Multiple comorbid conditions including active cancer    Medical Necessity is demonstrated by the following  History  Co-morbidities and personal factors that may impact the plan of care Co-morbidities:   CAD, CHF, CKD stage 4, diabetes and history of cancer    Personal Factors:   no deficits     high   Examination  Body Structures and Functions, activity limitations and participation restrictions that may impact the plan of care Body Regions:   lower extremities  trunk    Body Systems:    ROM  Strength  Gait  Transfers  Transitions    Participation Restrictions:   None    Activity limitations:   Learning and applying knowledge  no deficits    General Tasks and Commands  no deficits    Communication  no deficits    Mobility  lifting and carrying objects  moving around using equipment (WC)  using transportation (bus, train, plane, car)  driving (bike, car, motorcycle)    Self care  no deficits    Domestic Life  shopping  cooking  doing house work (cleaning house, washing dishes, laundry)    Interactions/Relationships  no deficits    Life Areas  no deficits    Community and Social Life  community life  recreation and leisure         moderate   Clinical Presentation evolving clinical presentation with changing clinical characteristics moderate   Decision Making/ Complexity Score: high     Goals:  Short Term Goals: 4 weeks   1. This patient will be independent with a basic HEP. In progress, not met  2. This patient will increase B LE strength by 1 grade in order to be able to perform vehicle transfer with no difficulty. In progress, not met  3. This patient will have a pain rating of 1/10 for her low back at worst with ADLs. In progress, not met  4. Patient able to score greater than or equal to 60% on the FOTO Hip Survey indicating patient is 40% impaired, limited, or restricted and demonstrating overall decreased hip pain with functional activities.  In progress, not met     Long Term Goals 8 weeks   1. This patient will be independent with an updated HEP. In progress, not met  2. This patient will increase B LE strength to 4+/5 in order to be able to perform usual household duties with no complaints of pain. In progress, not met  3. Patient able to score greater than or equal to 75% on the FOTO Hip Survey indicating patient is 25% impaired, limited, or restricted and demonstrating overall decreased hip pain with functional activities. In progress, not met    PLAN   Plan of care Certification: 3/29/2022 to 5/29/2022.    Outpatient Physical Therapy 2 times weekly for 8 weeks to include the following interventions: Gait Training, Manual Therapy, Moist Heat/ Ice, Neuromuscular Re-ed, Patient Education, Therapeutic Activities and Therapeutic Exercise.     Jamee Rubalcava, PT      I CERTIFY THE NEED FOR THESE SERVICES FURNISHED UNDER THIS PLAN OF TREATMENT AND WHILE UNDER MY CARE   Physician's comments:     Physician's Signature: ___________________________________________________

## 2022-04-01 ENCOUNTER — LAB VISIT (OUTPATIENT)
Dept: LAB | Facility: HOSPITAL | Age: 77
End: 2022-04-01
Attending: INTERNAL MEDICINE
Payer: MEDICARE

## 2022-04-01 DIAGNOSIS — Z01.810 PRE-OPERATIVE CARDIOVASCULAR EXAMINATION: ICD-10-CM

## 2022-04-01 LAB
ANION GAP SERPL CALC-SCNC: 9 MMOL/L (ref 8–16)
BASOPHILS # BLD AUTO: 0.05 K/UL (ref 0–0.2)
BASOPHILS NFR BLD: 0.7 % (ref 0–1.9)
CALCIUM SERPL-MCNC: 9.4 MG/DL (ref 8.7–10.5)
CHLORIDE SERPL-SCNC: 101 MMOL/L (ref 95–110)
CO2 SERPL-SCNC: 31 MMOL/L (ref 23–29)
CREAT SERPL-MCNC: 1.58 MG/DL (ref 0.5–1.4)
DIFFERENTIAL METHOD: ABNORMAL
EOSINOPHIL # BLD AUTO: 0.2 K/UL (ref 0–0.5)
EOSINOPHIL NFR BLD: 2.1 % (ref 0–8)
ERYTHROCYTE [DISTWIDTH] IN BLOOD BY AUTOMATED COUNT: 12.5 % (ref 11.5–14.5)
EST. GFR  (AFRICAN AMERICAN): 36.1 ML/MIN/1.73 M^2
EST. GFR  (NON AFRICAN AMERICAN): 31.3 ML/MIN/1.73 M^2
GLUCOSE SERPL-MCNC: 166 MG/DL (ref 70–110)
HCT VFR BLD AUTO: 43.9 % (ref 37–48.5)
HGB BLD-MCNC: 14 G/DL (ref 12–16)
IMM GRANULOCYTES # BLD AUTO: 0.01 K/UL (ref 0–0.04)
IMM GRANULOCYTES NFR BLD AUTO: 0.1 % (ref 0–0.5)
LYMPHOCYTES # BLD AUTO: 1.9 K/UL (ref 1–4.8)
LYMPHOCYTES NFR BLD: 25.2 % (ref 18–48)
MCH RBC QN AUTO: 32 PG (ref 27–31)
MCHC RBC AUTO-ENTMCNC: 31.9 G/DL (ref 32–36)
MCV RBC AUTO: 100 FL (ref 82–98)
MONOCYTES # BLD AUTO: 0.6 K/UL (ref 0.3–1)
MONOCYTES NFR BLD: 7.9 % (ref 4–15)
NEUTROPHILS # BLD AUTO: 4.8 K/UL (ref 1.8–7.7)
NEUTROPHILS NFR BLD: 64 % (ref 38–73)
NRBC BLD-RTO: 0 /100 WBC
PLATELET # BLD AUTO: 219 K/UL (ref 150–450)
PMV BLD AUTO: 10 FL (ref 9.2–12.9)
POTASSIUM SERPL-SCNC: 4.6 MMOL/L (ref 3.5–5.1)
RBC # BLD AUTO: 4.38 M/UL (ref 4–5.4)
SODIUM SERPL-SCNC: 141 MMOL/L (ref 136–145)
UUN UR-MCNC: 32 MG/DL (ref 7–17)
WBC # BLD AUTO: 7.46 K/UL (ref 3.9–12.7)

## 2022-04-01 PROCEDURE — 36415 COLL VENOUS BLD VENIPUNCTURE: CPT | Mod: PO | Performed by: INTERNAL MEDICINE

## 2022-04-01 PROCEDURE — 85025 COMPLETE CBC W/AUTO DIFF WBC: CPT | Mod: PO | Performed by: INTERNAL MEDICINE

## 2022-04-01 PROCEDURE — 80048 BASIC METABOLIC PNL TOTAL CA: CPT | Mod: PO | Performed by: INTERNAL MEDICINE

## 2022-04-04 ENCOUNTER — ANESTHESIA (OUTPATIENT)
Dept: CARDIOLOGY | Facility: HOSPITAL | Age: 77
End: 2022-04-04
Payer: MEDICARE

## 2022-04-04 ENCOUNTER — HOSPITAL ENCOUNTER (OUTPATIENT)
Facility: HOSPITAL | Age: 77
Discharge: HOME | End: 2022-04-04
Attending: INTERNAL MEDICINE | Admitting: INTERNAL MEDICINE
Payer: MEDICARE

## 2022-04-04 ENCOUNTER — ANESTHESIA EVENT (OUTPATIENT)
Dept: CARDIOLOGY | Facility: HOSPITAL | Age: 77
End: 2022-04-04
Payer: MEDICARE

## 2022-04-04 VITALS
HEART RATE: 63 BPM | RESPIRATION RATE: 20 BRPM | SYSTOLIC BLOOD PRESSURE: 131 MMHG | WEIGHT: 212 LBS | HEIGHT: 61 IN | DIASTOLIC BLOOD PRESSURE: 61 MMHG | BODY MASS INDEX: 40.02 KG/M2 | TEMPERATURE: 98 F | OXYGEN SATURATION: 98 %

## 2022-04-04 DIAGNOSIS — I48.91 A-FIB: ICD-10-CM

## 2022-04-04 DIAGNOSIS — Z01.810 PRE-OPERATIVE CARDIOVASCULAR EXAMINATION: Primary | ICD-10-CM

## 2022-04-04 DIAGNOSIS — I48.91 ATRIAL FIBRILLATION STATUS POST CARDIOVERSION: ICD-10-CM

## 2022-04-04 DIAGNOSIS — I48.91 ATRIAL FIBRILLATION, UNSPECIFIED TYPE: ICD-10-CM

## 2022-04-04 LAB
CTP QC/QA: YES
POCT GLUCOSE: 145 MG/DL (ref 70–110)
SARS-COV-2 AG RESP QL IA.RAPID: NEGATIVE

## 2022-04-04 PROCEDURE — 92960 CARDIOVERSION ELECTRIC EXT: CPT | Mod: ,,, | Performed by: INTERNAL MEDICINE

## 2022-04-04 PROCEDURE — 93010 ELECTROCARDIOGRAM REPORT: CPT | Mod: ,,, | Performed by: INTERNAL MEDICINE

## 2022-04-04 PROCEDURE — 93010 EKG 12-LEAD: ICD-10-PCS | Mod: ,,, | Performed by: INTERNAL MEDICINE

## 2022-04-04 PROCEDURE — 37000008 HC ANESTHESIA 1ST 15 MINUTES: Performed by: INTERNAL MEDICINE

## 2022-04-04 PROCEDURE — 92960 CARDIOVERSION ELECTRIC EXT: CPT | Performed by: INTERNAL MEDICINE

## 2022-04-04 PROCEDURE — 93005 ELECTROCARDIOGRAM TRACING: CPT | Mod: 59

## 2022-04-04 PROCEDURE — 25000003 PHARM REV CODE 250: Performed by: NURSE ANESTHETIST, CERTIFIED REGISTERED

## 2022-04-04 PROCEDURE — 63600175 PHARM REV CODE 636 W HCPCS: Performed by: NURSE ANESTHETIST, CERTIFIED REGISTERED

## 2022-04-04 PROCEDURE — 92960 PR CARDIOVERSION, ELECTIVE;EXTERN: ICD-10-PCS | Mod: ,,, | Performed by: INTERNAL MEDICINE

## 2022-04-04 RX ORDER — ETOMIDATE 2 MG/ML
INJECTION INTRAVENOUS
Status: DISCONTINUED | OUTPATIENT
Start: 2022-04-04 | End: 2022-04-04

## 2022-04-04 RX ORDER — METOPROLOL SUCCINATE 50 MG/1
50 TABLET, EXTENDED RELEASE ORAL DAILY
Qty: 90 TABLET | Refills: 3 | Status: SHIPPED | OUTPATIENT
Start: 2022-04-04 | End: 2023-01-06 | Stop reason: SDUPTHER

## 2022-04-04 RX ORDER — PROPOFOL 10 MG/ML
VIAL (ML) INTRAVENOUS
Status: DISCONTINUED | OUTPATIENT
Start: 2022-04-04 | End: 2022-04-04

## 2022-04-04 RX ADMIN — PROPOFOL 20 MG: 10 INJECTION, EMULSION INTRAVENOUS at 12:04

## 2022-04-04 RX ADMIN — ETOMIDATE 8 MG: 2 INJECTION, SOLUTION INTRAVENOUS at 12:04

## 2022-04-04 RX ADMIN — SODIUM CHLORIDE: 0.9 INJECTION, SOLUTION INTRAVENOUS at 12:04

## 2022-04-04 NOTE — DISCHARGE SUMMARY
[facility] - Lab (Hospital)  Discharge Note  Short Stay    Procedure(s) (LRB):  Cardioversion or Defibrillation (N/A)    OUTCOME: Patient tolerated treatment/procedure well without complication and is now ready for discharge.    DISPOSITION: Home or Self Care    FINAL DIAGNOSIS:  <principal problem not specified>    FOLLOWUP: In clinic    DISCHARGE INSTRUCTIONS:    Discharge Procedure Orders   CBC W/ AUTO DIFFERENTIAL   Standing Status: Future Number of Occurrences: 1 Standing Exp. Date: 05/29/23     BASIC METABOLIC PANEL   Standing Status: Future Number of Occurrences: 1 Standing Exp. Date: 05/29/23     COVID-19 Routine Screening   Standing Status: Future Standing Exp. Date: 05/29/23     Order Specific Question Answer Comments   Is the patient symptomatic? No    Is this needed for pre-procedure or pre-op testing? Yes    Diagnosis: Pre-operative cardiovascular examination [V72.81.ICD-9-CM]          Clinical Reference Documents Added to Patient Instructions       Document    ATRIAL FIBRILLATION (ENGLISH)    CARDIOVERSION (ENGLISH)    CARDIOVERSION DISCHARGE INSTRUCTIONS (ENGLISH)

## 2022-04-04 NOTE — PLAN OF CARE
Pt arrived independently from home, ambulates with walker assistant at home but arrived to pre post in wheelchair. Patient lying in bed with no complaints of pain or distress noted. Monitors applied. VSS, AAOx4. NADN. Yellow non-skid socks placed on patient. Fall risk reviewed with patient. EKG completed at bedside, Afib.     Dr. Marcial at bedside for anesthesia consent. Pre procedure consent pending Dr. Patrick. Procedure and recovery process explained to patient and all questions answered. Patient verbalized understanding, denies questions. Will continue to monitor for safety and needs.

## 2022-04-04 NOTE — PLAN OF CARE
Patient discharged to home as ordered after 1 hour recovery per Dr. Patrick. Pt is AAOx4, VSS, denies any pain. All discharge instructions and printed materials reviewed and given to patient. Patient instructed on follow-up appointment as ordered. Prescription delivered to bedside by pharmacy and reviewed with patient. Patient verbalized understanding and agreement with all discharge instructions given.     Pt voided without difficulty, ambulated to restroom x2. Pt ate full meal tray. No n/v.     Pt got dressed and moving around without difficulty and no distress noted. Pt in w/c. Right AC 20 gauge iv dc'd as ordered with tip intact. Gauze and koban dressing applied c.d.i.  Pt discharged home via wheelchair to private vehicle by pt's son.

## 2022-04-04 NOTE — BRIEF OP NOTE
Procedure: DCCV    Pre-procedure rhythm AFib 80s  Sp DCCV 200J x1  Post-procedure rhythm SB 50s    Decrease metoprolol succinate to 50 mg daily    Updated son Barry over the phone

## 2022-04-04 NOTE — ANESTHESIA POSTPROCEDURE EVALUATION
Anesthesia Post Evaluation    Patient: Yajaira Terry    Procedure(s) Performed: Procedure(s) (LRB):  Cardioversion or Defibrillation (N/A)    Final Anesthesia Type: MAC      Patient location during evaluation: Cath Lab  Patient participation: Yes- Able to Participate  Level of consciousness: awake and alert and oriented  Post-procedure vital signs: reviewed and stable  Pain management: adequate  Airway patency: patent    PONV status at discharge: No PONV  Anesthetic complications: no      Cardiovascular status: blood pressure returned to baseline and hemodynamically stable  Respiratory status: unassisted, room air and spontaneous ventilation  Hydration status: euvolemic  Follow-up not needed.          Vitals Value Taken Time   /88 04/04/22 1232   Temp 36.4 °C (97.5 °F) 04/04/22 0949   Pulse 86 04/04/22 1235   Resp 35 04/04/22 1212   SpO2 96 % 04/04/22 1235   Vitals shown include unvalidated device data.      No case tracking events are documented in the log.      Pain/Kirby Score: Kirby Score: 10 (4/4/2022 11:13 AM)

## 2022-04-04 NOTE — PLAN OF CARE
Pt visibly and verbally agitated with staff as we prepared her for her procedure today. Reports she is sensitive, has sensitive skin. Staff reassured her we have to check her pulses, check for swelling in her legs, place an IV line, check VS via monitor, etc as prep for her cardioversion and these are all necessary steps.    Pt given headphones and channel listings to watch TV while she waits for procedure to start. Will continue to monitor for safety and needs through to discharge.

## 2022-04-04 NOTE — ANESTHESIA PREPROCEDURE EVALUATION
2022  Yajaira Terry is a 76 y.o., female for cardioversion under MAC. EF 30%, BMI 40    Past Medical History:   Diagnosis Date    Arthritis     Atrial fibrillation     CHF (congestive heart failure)     CKD (chronic kidney disease) stage 4, GFR 15-29 ml/min     Congenital heart disease     Coronary artery disease     Diabetes mellitus     Diabetes mellitus, type 2     Dyslipidemia     Fractures     Hematuria     Hyperphosphatemia     Nephrolithiasis     Osteoporosis, unspecified     Proteinuria     Renal cell carcinoma of right kidney     Solitary left kidney     Tobacco use     Urinary tract infection     Vaginal infection      Past Surgical History:   Procedure Laterality Date    APPENDECTOMY       SECTION          ELBOW SURGERY      HAND SURGERY      HYSTERECTOMY       - partial    INCONTINENCE SURGERY      KNEE SURGERY      LAPAROSCOPIC ROBOT-ASSISTED SURGICAL REMOVAL OF KIDNEY USING DA RAYMON XI Right     WRIST FRACTURE SURGERY             Pre-op Assessment    I have reviewed the Patient Summary Reports.    I have reviewed the NPO Status.   I have reviewed the Medications.     Review of Systems  Anesthesia Hx:   Denies Personal Hx of Anesthesia complications.   Social:  Former Smoker    Endocrine:  Morbid Obesity / BMI > 40      Physical Exam  General: Well nourished    Airway:  Mallampati: II         Lab Results   Component Value Date    WBC 7.46 2022    HGB 14.0 2022    HCT 43.9 2022     2022    CHOL 150 2021    TRIG 122 2021    HDL 53 2021    ALT 25 2022    AST 35 2022     2022    K 4.6 2022     2022    CREATININE 1.58 (H) 2022    BUN 32 (H) 2022    CO2 31 (H) 2022    TSH 2.190 2021    INR 2.6 (H) 2022    HGBA1C 7.2 (H) 2022      10/2021  · Concentric hypertrophy and moderately decreased systolic function.  · The estimated ejection fraction is 30%.  · Left ventricular diastolic dysfunction.  · Normal right ventricular size with normal right ventricular systolic function.  · Mild-to-moderate mitral regurgitation.  · Mild tricuspid regurgitation.  · Moderate pulmonic regurgitation.  · Normal central venous pressure (3 mmHg).  · The estimated PA systolic pressure is 21 mmHg.        Anesthesia Plan  Type of Anesthesia, risks & benefits discussed:    Anesthesia Type: MAC  Intra-op Monitoring Plan: Standard ASA Monitors  Informed Consent: Informed consent signed with the Patient and all parties understand the risks and agree with anesthesia plan.  All questions answered.   ASA Score: 4    Ready For Surgery From Anesthesia Perspective.     .

## 2022-04-04 NOTE — PLAN OF CARE
Pt s/p DCCV with successful restoration of NSR / sinus bradycardia. Pt is calm, AAOx3, VSS. NADN. Dr. Patrick notifying son of procedure end. Will contact pt's son with discharge information. Will continue to monitor for safety and needs through to discharge.

## 2022-04-04 NOTE — DISCHARGE INSTRUCTIONS
Discharge Instructions:      Wear surgical mask.    You may have bruising to chest or back.  Take tylenol as needed for soreness.  Any chest pain, shortness of breath call 911 go to ER.    Do not drive a car, operate heavy equipment, care for a young child, etc for the next 12-24 hours.   Avoid drinking alcohol for 24 hours.  Do not make any important decisions for 24 hours.    Drink fluids to keep hydrated. Resume your usual diet as tolerated.   Rest for today then activity as tolerated.   Do not lift anything over 5 pounds for the first 3 days after procedure.    Call MD for any unrelieved pain, excessive nausea or vomiting, redness around site, bleeding, or pus or foul smelling drainage, or any other questions or concerns.  Go to the ER for any difficulty breathing or chest pain.    Follow any additional instructions given to you by MD.   Call MD to schedule a follow up appointment, or follow up as instructed.

## 2022-04-04 NOTE — NURSING
Patient crying and upset about the procedure delay and being uncomfortable and has spoken to dr last and she is aware; pt wants to get out of bed and be disconnected from monitors until her procedure and assisted pt to bathroom per request per whch; pt does not want to use the pure wick, so removed

## 2022-04-04 NOTE — TRANSFER OF CARE
"Anesthesia Transfer of Care Note    Patient: Yajaria Terry    Procedure(s) Performed: Procedure(s) (LRB):  Cardioversion or Defibrillation (N/A)    Patient location: Cath Lab    Anesthesia Type: MAC    Transport from OR: Transported from OR on room air with adequate spontaneous ventilation    Post pain: adequate analgesia    Post assessment: tolerated procedure well and no apparent anesthetic complications    Post vital signs: stable    Level of consciousness: awake, alert and oriented    Nausea/Vomiting: no nausea/vomiting    Complications: none    Transfer of care protocol was followed      Last vitals:   Visit Vitals  BP (!) 147/83 (BP Location: Left arm, Patient Position: Sitting)   Pulse 93   Temp 36.4 °C (97.5 °F) (Temporal)   Resp (!) 24   Ht 5' 1" (1.549 m)   Wt 96.2 kg (212 lb)   SpO2 99%   BMI 40.06 kg/m²     "

## 2022-04-08 RX ORDER — TRAMADOL HYDROCHLORIDE 50 MG/1
TABLET ORAL
Qty: 90 TABLET | Refills: 5 | Status: SHIPPED | OUTPATIENT
Start: 2022-04-08 | End: 2023-02-06 | Stop reason: SDUPTHER

## 2022-04-08 NOTE — TELEPHONE ENCOUNTER
No new care gaps identified.  Powered by [employer]. Reference number: [id number].   4/08/2022 8:39:52 AM CDT

## 2022-04-13 ENCOUNTER — LAB VISIT (OUTPATIENT)
Dept: LAB | Facility: HOSPITAL | Age: 77
End: 2022-04-13
Attending: STUDENT IN AN ORGANIZED HEALTH CARE EDUCATION/TRAINING PROGRAM
Payer: MEDICARE

## 2022-04-13 DIAGNOSIS — Z79.01 LONG TERM (CURRENT) USE OF ANTICOAGULANTS: ICD-10-CM

## 2022-04-13 DIAGNOSIS — I48.91 ATRIAL FIBRILLATION WITH RVR: ICD-10-CM

## 2022-04-13 LAB
INR PPP: 2.2 (ref 0.8–1.2)
PROTHROMBIN TIME: 22.6 SEC (ref 9–12.5)

## 2022-04-13 PROCEDURE — 36415 COLL VENOUS BLD VENIPUNCTURE: CPT | Mod: PO | Performed by: STUDENT IN AN ORGANIZED HEALTH CARE EDUCATION/TRAINING PROGRAM

## 2022-04-13 PROCEDURE — 85610 PROTHROMBIN TIME: CPT | Mod: PO | Performed by: STUDENT IN AN ORGANIZED HEALTH CARE EDUCATION/TRAINING PROGRAM

## 2022-04-14 ENCOUNTER — ANTI-COAG VISIT (OUTPATIENT)
Dept: CARDIOLOGY | Facility: CLINIC | Age: 77
End: 2022-04-14
Payer: MEDICARE

## 2022-04-14 ENCOUNTER — TELEPHONE (OUTPATIENT)
Dept: FAMILY MEDICINE | Facility: CLINIC | Age: 77
End: 2022-04-14
Payer: MEDICARE

## 2022-04-14 DIAGNOSIS — M17.11 PRIMARY OSTEOARTHRITIS OF RIGHT KNEE: ICD-10-CM

## 2022-04-14 DIAGNOSIS — M17.12 PRIMARY OSTEOARTHRITIS OF LEFT KNEE: Primary | ICD-10-CM

## 2022-04-14 PROCEDURE — 93793 PR ANTICOAGULANT MGMT FOR PT TAKING WARFARIN: ICD-10-PCS | Mod: S$GLB,,,

## 2022-04-14 PROCEDURE — 93793 ANTICOAG MGMT PT WARFARIN: CPT | Mod: S$GLB,,,

## 2022-04-14 NOTE — TELEPHONE ENCOUNTER
----- Message from Mora Mendez sent at 4/14/2022 11:03 AM CDT -----  Regarding: new walker  Contact: [phone]  Patient is requesting a call back regarding needing a new walker.   Would the patient rather a call back or a response via [facility]?  call  Best Call Back Number:  [phone]  Additional Information:  Call her after 2pm today to discuss.

## 2022-04-20 ENCOUNTER — PES CALL (OUTPATIENT)
Dept: ADMINISTRATIVE | Facility: CLINIC | Age: 77
End: 2022-04-20
Payer: MEDICARE

## 2022-05-02 ENCOUNTER — PATIENT OUTREACH (OUTPATIENT)
Dept: ADMINISTRATIVE | Facility: OTHER | Age: 77
End: 2022-05-02
Payer: MEDICARE

## 2022-05-02 NOTE — PROGRESS NOTES
Health Maintenance Due   Topic Date Due    COVID-19 Vaccine (1) Never done    Foot Exam  03/26/2022    Lipid Panel  03/29/2022     Updates were requested from care everywhere.  Chart was reviewed for overdue [facility]active [facility] Encounters ([facility]) topics (CRS, Breast Cancer Screening, Eye exam)  Health Maintenance has been updated.  LINKS immunization registry triggered.  Immunizations were reconciled.

## 2022-05-03 ENCOUNTER — OFFICE VISIT (OUTPATIENT)
Dept: CARDIOLOGY | Facility: CLINIC | Age: 77
End: 2022-05-03
Payer: MEDICARE

## 2022-05-03 VITALS — BODY MASS INDEX: 39.3 KG/M2 | WEIGHT: 208 LBS

## 2022-05-03 DIAGNOSIS — I48.91 ATRIAL FIBRILLATION, UNSPECIFIED TYPE: Primary | ICD-10-CM

## 2022-05-03 PROCEDURE — 1126F AMNT PAIN NOTED NONE PRSNT: CPT | Mod: CPTII,S$GLB,, | Performed by: INTERNAL MEDICINE

## 2022-05-03 PROCEDURE — 1160F RVW MEDS BY RX/DR IN RCRD: CPT | Mod: CPTII,S$GLB,, | Performed by: INTERNAL MEDICINE

## 2022-05-03 PROCEDURE — 99999 PR PBB SHADOW E&M-EST. PATIENT-LVL III: CPT | Mod: PBBFAC,,, | Performed by: INTERNAL MEDICINE

## 2022-05-03 PROCEDURE — 99999 PR PBB SHADOW E&M-EST. PATIENT-LVL III: ICD-10-PCS | Mod: PBBFAC,,, | Performed by: INTERNAL MEDICINE

## 2022-05-03 PROCEDURE — 99212 PR OFFICE/OUTPT VISIT, EST, LEVL II, 10-19 MIN: ICD-10-PCS | Mod: S$GLB,,, | Performed by: INTERNAL MEDICINE

## 2022-05-03 PROCEDURE — 1159F PR MEDICATION LIST DOCUMENTED IN MEDICAL RECORD: ICD-10-PCS | Mod: CPTII,S$GLB,, | Performed by: INTERNAL MEDICINE

## 2022-05-03 PROCEDURE — 99212 OFFICE O/P EST SF 10 MIN: CPT | Mod: S$GLB,,, | Performed by: INTERNAL MEDICINE

## 2022-05-03 PROCEDURE — 1126F PR PAIN SEVERITY QUANTIFIED, NO PAIN PRESENT: ICD-10-PCS | Mod: CPTII,S$GLB,, | Performed by: INTERNAL MEDICINE

## 2022-05-03 PROCEDURE — 1159F MED LIST DOCD IN RCRD: CPT | Mod: CPTII,S$GLB,, | Performed by: INTERNAL MEDICINE

## 2022-05-03 PROCEDURE — 1160F PR REVIEW ALL MEDS BY PRESCRIBER/CLIN PHARMACIST DOCUMENTED: ICD-10-PCS | Mod: CPTII,S$GLB,, | Performed by: INTERNAL MEDICINE

## 2022-05-03 NOTE — PROGRESS NOTES
Vitals:     EKG today SB 50s  No syncope, palpitations    Lost 12 lbs since [facility]    Echo mid-July (3 mon from [facility])

## 2022-05-18 ENCOUNTER — LAB VISIT (OUTPATIENT)
Dept: LAB | Facility: HOSPITAL | Age: 77
End: 2022-05-18
Attending: STUDENT IN AN ORGANIZED HEALTH CARE EDUCATION/TRAINING PROGRAM
Payer: MEDICARE

## 2022-05-18 ENCOUNTER — ANTI-COAG VISIT (OUTPATIENT)
Dept: CARDIOLOGY | Facility: CLINIC | Age: 77
End: 2022-05-18
Payer: MEDICARE

## 2022-05-18 DIAGNOSIS — I48.91 ATRIAL FIBRILLATION WITH RVR: Primary | ICD-10-CM

## 2022-05-18 DIAGNOSIS — Z79.01 LONG TERM (CURRENT) USE OF ANTICOAGULANTS: ICD-10-CM

## 2022-05-18 DIAGNOSIS — I48.91 ATRIAL FIBRILLATION WITH RVR: ICD-10-CM

## 2022-05-18 LAB
INR PPP: 2 (ref 0.8–1.2)
PROTHROMBIN TIME: 21.1 SEC (ref 9–12.5)

## 2022-05-18 PROCEDURE — 85610 PROTHROMBIN TIME: CPT | Mod: PO | Performed by: STUDENT IN AN ORGANIZED HEALTH CARE EDUCATION/TRAINING PROGRAM

## 2022-05-18 PROCEDURE — 36415 COLL VENOUS BLD VENIPUNCTURE: CPT | Mod: PO | Performed by: STUDENT IN AN ORGANIZED HEALTH CARE EDUCATION/TRAINING PROGRAM

## 2022-05-18 PROCEDURE — 93793 ANTICOAG MGMT PT WARFARIN: CPT | Mod: S$GLB,,,

## 2022-05-18 PROCEDURE — 93793 PR ANTICOAGULANT MGMT FOR PT TAKING WARFARIN: ICD-10-PCS | Mod: S$GLB,,,

## 2022-05-19 ENCOUNTER — TELEPHONE (OUTPATIENT)
Dept: FAMILY MEDICINE | Facility: CLINIC | Age: 77
End: 2022-05-19
Payer: MEDICARE

## 2022-05-19 NOTE — TELEPHONE ENCOUNTER
----- Message from Priscilla Hickman sent at 5/19/2022  8:33 AM CDT -----  Type:  Needs Medical Advice    Who Called: patient  Would the patient rather a call back or a response via [facility]? call  Best Call Back Number: [phone]  Additional Information: She is interested in losing weight and is interested in eustasis.    I LM for the pt to rtn call to discuss exactly what she is asking for ( eustasis )

## 2022-05-19 NOTE — TELEPHONE ENCOUNTER
----- Message from Bertha Brower sent at 5/19/2022 11:35 AM CDT -----  Type:  Patient Returning Call    Who Called:pt  Who Left Message for Patient:office  Does the patient know what this is regarding?:pt refused to answer  Would the patient rather a call back or a response via [facility]? Please call  Best Call Back Number:[phone]  Additional Information:

## 2022-05-20 ENCOUNTER — TELEPHONE (OUTPATIENT)
Dept: CARDIOLOGY | Facility: CLINIC | Age: 77
End: 2022-05-20

## 2022-05-20 NOTE — TELEPHONE ENCOUNTER
Spoke to patient. Patient stated that she really would like medication to help suppress her appetite. I explained that we would need to schedule her an appointment to discuss medications.     Patient mentioned a medication called eustasis and if it would be fine to take with her current medications.

## 2022-05-20 NOTE — TELEPHONE ENCOUNTER
Spoke to patient. I explained that she needs also needs to reach out to her cardiologist before starting any new appetite suppressant.

## 2022-05-20 NOTE — TELEPHONE ENCOUNTER
Called pt back in regards to this message.     Pt stated she spoke with PCP and would like to start diet pills  Pcp informed pt she will have to follow up with Dr. Patrick to be cleared for diet pills    Informed pt will send a message to Dr. Patrick to please advise    Pt expressed frustration with current weight and losing weight     ND

## 2022-05-20 NOTE — TELEPHONE ENCOUNTER
----- Message from Najma Rush sent at 5/20/2022  2:01 PM CDT -----  Type: Requesting to speak with nurse         Who Called: PT  Regarding: pt wants tot know if she could take diet pills-pcp told her to check with you please advise   Would the patient rather a call back or a response via [facility]? Call back  Best Call Back Number: [phone]  Additional Information: n/a

## 2022-05-20 NOTE — TELEPHONE ENCOUNTER
I am unaware of any medication called eustasis. She will need to see me and her cardiologist before starting appetite suppressant.

## 2022-05-20 NOTE — TELEPHONE ENCOUNTER
----- Message from Bertha Brower sent at 5/20/2022  9:54 AM CDT -----  Type:  Needs Medical Advice    Who Called: pt  Pt needs to discuss weight lose  Would the patient rather a call back or a response via [facility]? Please call  Best Call Back Number: [phone]  Additional Information:

## 2022-05-23 ENCOUNTER — TELEPHONE (OUTPATIENT)
Dept: FAMILY MEDICINE | Facility: CLINIC | Age: 77
End: 2022-05-23

## 2022-05-23 NOTE — TELEPHONE ENCOUNTER
----- Message from Lindy Plata sent at 5/23/2022  8:51 AM CDT -----  Type:  Patient Returning Call    Who Called:Yajaira  Who Left Message for Patient:self  Does the patient know what this is regarding?:diet pills  Would the patient rather a call back or a response via [employer]? Call back  Best Call Back Number:[phone]  Additional Information: Patient stated Dr. Mays said no diet pills because she has a heart condition.

## 2022-05-30 DIAGNOSIS — C64.9 RENAL CELL CANCER: Primary | ICD-10-CM

## 2022-06-06 ENCOUNTER — TELEPHONE (OUTPATIENT)
Dept: CARDIOLOGY | Facility: CLINIC | Age: 77
End: 2022-06-06

## 2022-06-06 NOTE — TELEPHONE ENCOUNTER
Called the pt in regards to this message.   The pt did not answer, but I left a detailed voice message as well as a call back number.      Pt stated earlier she had a miss call from Dr. Patrick and was returning a call    Please advise        ND

## 2022-06-06 NOTE — TELEPHONE ENCOUNTER
----- Message from Keily Willoughby sent at 6/6/2022 11:19 AM CDT -----  Contact: pt  Type:  Patient Returning Call    Who Called: pt  Who Left Message for Patient: office   Does the patient know what this is regarding?: not sure she had a missed call   Would the patient rather a call back or a response via [facility]? call  Best Call Back Number: [phone]  Additional Information:

## 2022-06-06 NOTE — TELEPHONE ENCOUNTER
----- Message from Nandini Naik sent at 6/6/2022  1:27 PM CDT -----  Regarding: questions  Contact: [phone]  Pt Questions    Questions: Pt calling to speak with the nurse  about her appt   Call Back number: [phone]

## 2022-06-06 NOTE — TELEPHONE ENCOUNTER
----- Message from Nandini Naik sent at 6/6/2022  1:27 PM CDT -----  Regarding: questions  Contact: [phone]  Pt Questions    Questions: Pt calling to speak with the nurse  about her appt   Call Back number: [phone]

## 2022-06-06 NOTE — TELEPHONE ENCOUNTER
Call was transferred from call center    Pt stated she received a missed call and a message from Dr. Patrick    Informed pt will send a message to Dr. Patrick to please advise    ND

## 2022-06-13 ENCOUNTER — TELEPHONE (OUTPATIENT)
Dept: CARDIOLOGY | Facility: CLINIC | Age: 77
End: 2022-06-13
Payer: MEDICARE

## 2022-06-13 NOTE — TELEPHONE ENCOUNTER
Called pt to follow up in regards to previous phone encounter    Pt did not answer  Left detailed voice message as well as call back number    ND

## 2022-06-15 RX ORDER — AMIODARONE HYDROCHLORIDE 200 MG/1
200 TABLET ORAL DAILY
Qty: 90 TABLET | Refills: 1 | Status: SHIPPED | OUTPATIENT
Start: 2022-06-15 | End: 2022-08-24 | Stop reason: SDUPTHER

## 2022-06-15 NOTE — TELEPHONE ENCOUNTER
----- Message from Parvin Burgess sent at 6/15/2022  3:24 PM CDT -----  Contact: NICCI KIDD [[id number]] [phone]  Type:  RX Refill Request    Who Called: NICCI KIDD [[id number]]  Refill or New Rx: Refill  RX Name and Strength: amiodarone (PACERONE) 200 MG Tab  How is the patient currently taking it? (ex. 1XDay): 1 tab by mouth every 24 hours  Is this a 30 day or 90 day RX: 90  Preferred Pharmacy with phone number: [facility]ACY #[id number] - [address] AT [facility], [phone]  Local or Mail Order: Local   Ordering Provider: Dr. Ra Vargas  Would the patient rather a call back or a response via [facility]? Phone call   Best Call Back Number: [phone]  Additional Information: Patient indicated she submitted a refill request for this prescription yesterday.

## 2022-06-21 ENCOUNTER — TELEPHONE (OUTPATIENT)
Dept: CARDIOLOGY | Facility: CLINIC | Age: 77
End: 2022-06-21
Payer: MEDICARE

## 2022-06-21 NOTE — TELEPHONE ENCOUNTER
Called pt back in regards to this message.       Pt was returning previous phone call attempt    Informed pt Dr. Patrick can place a referral for Bariatric department     Pt agreed to have referral     ND

## 2022-06-21 NOTE — TELEPHONE ENCOUNTER
----- Message from Sita Gaming sent at 6/21/2022  3:23 PM CDT -----  Regarding: call back  Type:  Patient Returning Call    Who Called:patient     Who Left Message for Patient:office    Does the patient know what this is regarding?:missed call     Would the patient rather a call back or a response via [employer]? Call back     Best Call Back Number:[phone]  Additional Information: n/a

## 2022-06-23 DIAGNOSIS — E66.9 OBESITY, UNSPECIFIED CLASSIFICATION, UNSPECIFIED OBESITY TYPE, UNSPECIFIED WHETHER SERIOUS COMORBIDITY PRESENT: Primary | ICD-10-CM

## 2022-06-27 ENCOUNTER — TELEPHONE (OUTPATIENT)
Dept: BARIATRICS | Facility: CLINIC | Age: 77
End: 2022-06-27
Payer: MEDICARE

## 2022-07-13 ENCOUNTER — TELEPHONE (OUTPATIENT)
Dept: CARDIOLOGY | Facility: CLINIC | Age: 77
End: 2022-07-13
Payer: MEDICARE

## 2022-07-13 NOTE — TELEPHONE ENCOUNTER
Called pt back in regards to this message.   Pt stated she was given a prescription of metoprolol from pharmacy to take medication two tablets by mouth twice daily    Informed pt Dr. Patrick sent a prescription to pharmacy with directions to take one tablet by mouth once daily    Pt expressed understanding    ND

## 2022-07-13 NOTE — TELEPHONE ENCOUNTER
----- Message from Marcel Escobedo sent at 7/13/2022  8:47 AM CDT -----  Name Of Caller: Yajaira        Provider Name: Anila Patrick        Does patient feel the need to be seen today? no        Relationship to the Pt?: patient        Contact Preference?:  [phone]        What is the nature of the call?: Patient states that she needs speak with someone in the office to get clarification on the dosage for her  metoprolol succinate (TOPROL-XL) 50 MG 24 hr tablet

## 2022-07-19 ENCOUNTER — PES CALL (OUTPATIENT)
Dept: ADMINISTRATIVE | Facility: CLINIC | Age: 77
End: 2022-07-19
Payer: MEDICARE

## 2022-07-22 ENCOUNTER — TELEPHONE (OUTPATIENT)
Dept: CARDIOLOGY | Facility: CLINIC | Age: 77
End: 2022-07-22

## 2022-07-22 DIAGNOSIS — I48.91 ATRIAL FIBRILLATION WITH RVR: Primary | ICD-10-CM

## 2022-07-22 NOTE — TELEPHONE ENCOUNTER
Called pt back in regards to this message.     Informed pt will have to call back to reschedule echo and follow up appointment with Dr. Patrick    Pt stated she does not think the results of the echo would be finalized if appointments are scheduled on the same day    Informed pt will follow up with Dr. Patrick in regards to this concern as well as rescheduling      ND

## 2022-07-22 NOTE — TELEPHONE ENCOUNTER
----- Message from Suresh Ford sent at 7/22/2022 12:43 PM CDT -----  Pt would like to be called back asap regarding rescheduling her missed 7/19/22 appts to 8/9/22    Pt can be reached at [phone].    Thanks

## 2022-07-26 LAB
LEFT EYE DM RETINOPATHY: POSITIVE
RIGHT EYE DM RETINOPATHY: POSITIVE

## 2022-07-27 ENCOUNTER — PATIENT OUTREACH (OUTPATIENT)
Dept: ADMINISTRATIVE | Facility: HOSPITAL | Age: 77
End: 2022-07-27
Payer: MEDICARE

## 2022-07-29 ENCOUNTER — TELEPHONE (OUTPATIENT)
Dept: BARIATRICS | Facility: CLINIC | Age: 77
End: 2022-07-29
Payer: MEDICARE

## 2022-07-29 NOTE — TELEPHONE ENCOUNTER
Returned patients call to reschedule FC phone call.  She entered into her calendar wrong and wanted to keep the previously scheduled FC phone appt for 9/9/2022 at 10a.

## 2022-07-29 NOTE — TELEPHONE ENCOUNTER
----- Message from [facility] sent at 7/29/2022  4:22 PM CDT -----  Type:  Needs Medical Advice    Who Called: PT   Would the patient rather a call back or a response via [employer]? Callback   Best Call Back Number: [phone]  Additional Information: Pt requesting callback to reschedule appt on 09/09/2022

## 2022-08-03 ENCOUNTER — HOSPITAL ENCOUNTER (OUTPATIENT)
Dept: CARDIOLOGY | Facility: HOSPITAL | Age: 77
Discharge: HOME | End: 2022-08-03
Attending: INTERNAL MEDICINE
Payer: MEDICARE

## 2022-08-03 ENCOUNTER — HOSPITAL ENCOUNTER (OUTPATIENT)
Dept: RADIOLOGY | Facility: HOSPITAL | Age: 77
Discharge: HOME | End: 2022-08-03
Attending: UROLOGY
Payer: MEDICARE

## 2022-08-03 VITALS — BODY MASS INDEX: 39.27 KG/M2 | HEIGHT: 61 IN | WEIGHT: 208 LBS

## 2022-08-03 DIAGNOSIS — I48.91 ATRIAL FIBRILLATION WITH RVR: ICD-10-CM

## 2022-08-03 DIAGNOSIS — C64.9 RENAL CELL CANCER: ICD-10-CM

## 2022-08-03 LAB
AV INDEX (PROSTH): 0.59
AV MEAN GRADIENT: 5 MMHG
AV PEAK GRADIENT: 10 MMHG
AV VELOCITY RATIO: 0.56
BSA FOR ECHO PROCEDURE: 2.01 M2
CV ECHO LV RWT: 0.37 CM
DOP CALC AO PEAK VEL: 1.56 M/S
DOP CALC AO VTI: 43.09 CM
DOP CALC LVOT PEAK VEL: 0.88 M/S
DOP CALC MV VTI: 43.65 CM
DOP CALCLVOT PEAK VEL VTI: 25.34 CM
E WAVE DECELERATION TIME: 261.56 MSEC
E/A RATIO: 1.23
E/E' RATIO: 16.31 M/S
ECHO LV POSTERIOR WALL: 1.16 CM (ref 0.6–1.1)
EJECTION FRACTION: 55 %
FRACTIONAL SHORTENING: 27 % (ref 28–44)
INTERVENTRICULAR SEPTUM: 0.55 CM (ref 0.6–1.1)
IVRT: 78.43 MSEC
LA MAJOR: 5.01 CM
LA MINOR: 5.36 CM
LA WIDTH: 3.85 CM
LEFT ATRIUM SIZE: 3.84 CM
LEFT ATRIUM VOLUME INDEX MOD: 26.6 ML/M2
LEFT ATRIUM VOLUME INDEX: 33.9 ML/M2
LEFT ATRIUM VOLUME MOD: 51.08 CM3
LEFT ATRIUM VOLUME: 65.08 CM3
LEFT INTERNAL DIMENSION IN SYSTOLE: 4.56 CM (ref 2.1–4)
LEFT VENTRICLE DIASTOLIC VOLUME INDEX: 103.73 ML/M2
LEFT VENTRICLE DIASTOLIC VOLUME: 199.17 ML
LEFT VENTRICLE MASS INDEX: 114 G/M2
LEFT VENTRICLE SYSTOLIC VOLUME INDEX: 49.6 ML/M2
LEFT VENTRICLE SYSTOLIC VOLUME: 95.19 ML
LEFT VENTRICULAR INTERNAL DIMENSION IN DIASTOLE: 6.27 CM (ref 3.5–6)
LEFT VENTRICULAR MASS: 218.32 G
LV LATERAL E/E' RATIO: 13.25 M/S
LV SEPTAL E/E' RATIO: 21.2 M/S
MV A" WAVE DURATION": 10.61 MSEC
MV MEAN GRADIENT: 1 MMHG
MV PEAK A VEL: 0.86 M/S
MV PEAK E VEL: 1.06 M/S
MV PEAK GRADIENT: 5 MMHG
MV STENOSIS PRESSURE HALF TIME: 75.85 MS
MV VALVE AREA P 1/2 METHOD: 2.9 CM2
PISA MRMAX VEL: 0.06 M/S
PISA TR MAX VEL: 2.32 M/S
PULM VEIN S/D RATIO: 1.04
PV PEAK D VEL: 0.45 M/S
PV PEAK S VEL: 0.47 M/S
PV PEAK VELOCITY: 0.92 CM/S
RA MAJOR: 5.2 CM
RA PRESSURE: 8 MMHG
RA WIDTH: 3.13 CM
TDI LATERAL: 0.08 M/S
TDI SEPTAL: 0.05 M/S
TDI: 0.07 M/S
TR MAX PG: 22 MMHG
TRICUSPID ANNULAR PLANE SYSTOLIC EXCURSION: 2.76 CM
TV REST PULMONARY ARTERY PRESSURE: 30 MMHG

## 2022-08-03 PROCEDURE — 93306 TTE W/DOPPLER COMPLETE: CPT | Mod: PO

## 2022-08-03 PROCEDURE — 93306 TTE W/DOPPLER COMPLETE: CPT | Mod: 26,,, | Performed by: INTERNAL MEDICINE

## 2022-08-03 PROCEDURE — 93306 ECHO (CUPID ONLY): ICD-10-PCS | Mod: 26,,, | Performed by: INTERNAL MEDICINE

## 2022-08-03 PROCEDURE — 74176 CT ABD & PELVIS W/O CONTRAST: CPT | Mod: TC,PO

## 2022-08-04 ENCOUNTER — PES CALL (OUTPATIENT)
Dept: ADMINISTRATIVE | Facility: CLINIC | Age: 77
End: 2022-08-04
Payer: MEDICARE

## 2022-08-04 ENCOUNTER — ANTI-COAG VISIT (OUTPATIENT)
Dept: CARDIOLOGY | Facility: CLINIC | Age: 77
End: 2022-08-04
Payer: MEDICARE

## 2022-08-04 DIAGNOSIS — I48.91 ATRIAL FIBRILLATION WITH RVR: Primary | ICD-10-CM

## 2022-08-04 DIAGNOSIS — Z79.01 LONG TERM (CURRENT) USE OF ANTICOAGULANTS: ICD-10-CM

## 2022-08-04 PROCEDURE — 93793 PR ANTICOAGULANT MGMT FOR PT TAKING WARFARIN: ICD-10-PCS | Mod: S$GLB,,,

## 2022-08-04 PROCEDURE — 93793 ANTICOAG MGMT PT WARFARIN: CPT | Mod: S$GLB,,,

## 2022-08-04 NOTE — PROGRESS NOTES
Please call patient re echocardiogram with normal pumping function of heart- this has improved since last echocardiogram (after the DCCV for Afib). We can set up a virtual appointment to discuss medication management

## 2022-08-05 NOTE — PROGRESS NOTES
INR not at goal. Medications, chart, and patient findings reviewed. See calendar for adjustments to dose and follow up plan.  Pt to resume normal diet & will stress compliance.  Will boost dosing & resume.  Plan to re-assess in 3 weeks.  As pt usually WNL on maintenance dose recommend to resume Q5week monitoring if lab in range next visit.

## 2022-08-09 LAB
LEFT EYE DM RETINOPATHY: POSITIVE
RIGHT EYE DM RETINOPATHY: POSITIVE

## 2022-08-10 ENCOUNTER — PATIENT OUTREACH (OUTPATIENT)
Dept: ADMINISTRATIVE | Facility: HOSPITAL | Age: 77
End: 2022-08-10
Payer: MEDICARE

## 2022-08-10 ENCOUNTER — TELEPHONE (OUTPATIENT)
Dept: CARDIOLOGY | Facility: CLINIC | Age: 77
End: 2022-08-10
Payer: MEDICARE

## 2022-08-10 NOTE — TELEPHONE ENCOUNTER
----- Message from Kat Pettit sent at 8/10/2022  9:26 AM CDT -----  Type:  Test Results    Who Called: pt  Name of Test (Lab/Mammo/Etc): echo   Date of Test: 8/3/22  Ordering Provider:   Where the test was performed: [facility]  Would the patient rather a call back or a response via [facility]? call  Best Call Back Number: [phone] (M)   Additional Information:

## 2022-08-10 NOTE — TELEPHONE ENCOUNTER
Called pt in regards to this message and results.  Pt did not answer   Left detailed voice message as well as call back number    ND

## 2022-08-10 NOTE — TELEPHONE ENCOUNTER
Called pt in regards to echo results.  Pt did not answer   Left detailed voice message as well as call back number    ND

## 2022-08-10 NOTE — TELEPHONE ENCOUNTER
----- Message from Anila Patrick MD sent at 8/4/2022  8:08 AM CDT -----  Please call patient re echocardiogram with normal pumping function of heart- this has improved since last echocardiogram (after the DCCV for Afib). We can set up a virtual appointment to discuss medication management

## 2022-08-12 ENCOUNTER — TELEPHONE (OUTPATIENT)
Dept: CARDIOLOGY | Facility: CLINIC | Age: 77
End: 2022-08-12
Payer: MEDICARE

## 2022-08-12 NOTE — TELEPHONE ENCOUNTER
----- Message from Piper Mathews MA sent at 8/11/2022  4:56 PM CDT -----    ----- Message -----  From: Cheryl Hannon  Sent: 8/11/2022  12:20 PM CDT  To: Darnell High Staff    Type:  Patient  Call    Who Called: Pt   Would the patient rather a call back or a response via [facility]? Call back   Best Call Back Number: [phone]  Additional Information:  pt wants Dr Patrick to call her about her ECHO

## 2022-08-12 NOTE — TELEPHONE ENCOUNTER
Attempted to call pt twice  Recording came on, call can not be completed at this time    Could not leave message    ND

## 2022-08-12 NOTE — TELEPHONE ENCOUNTER
----- Message from Tara Forman sent at 8/12/2022  8:06 AM CDT -----  Contact: [phone]/ Self  Type: Requesting to speak with nurse    Who Called: Pt   Regarding: go over test results    Would the patient rather a call back or a response via [employer]? Call back  Best Call Back Number: [phone]  Additional Information: n/a

## 2022-08-24 RX ORDER — AMIODARONE HYDROCHLORIDE 200 MG/1
200 TABLET ORAL DAILY
Qty: 90 TABLET | Refills: 1 | Status: SHIPPED | OUTPATIENT
Start: 2022-08-24 | End: 2022-12-27 | Stop reason: SDUPTHER

## 2022-08-24 NOTE — TELEPHONE ENCOUNTER
----- Message from Tara Forman sent at 8/24/2022  8:17 AM CDT -----  Contact: [phone]/Self  Type: Requesting to speak with nurse    Who Called: Pt   Regarding: requesting a refill on amiodarone (PACERONE) 200 MG Tab  Would the patient rather a call back or a response via [facility]? Call back  Best Call Back Number: [phone]  Additional Information: [facility] [place] , 90 day supply

## 2022-09-01 LAB
LEFT EYE DM RETINOPATHY: POSITIVE
RIGHT EYE DM RETINOPATHY: POSITIVE

## 2022-09-06 ENCOUNTER — TELEPHONE (OUTPATIENT)
Dept: CARDIOLOGY | Facility: CLINIC | Age: 77
End: 2022-09-06
Payer: MEDICARE

## 2022-09-06 NOTE — TELEPHONE ENCOUNTER
----- Message from Irma Rosario RN sent at 8/15/2022  5:08 PM CDT -----    ----- Message -----  From: Jude Renee  Sent: 8/15/2022   3:55 PM CDT  To: D[facility] Staff    Type:  Test Results    Who Called: pt   Name of Test (Lab/Mammo/Etc): Holter monitor   Date of Test:   Ordering Provider: darnell  Where the test was performed: [facility]  Would the patient rather a call back or a response via [employer]? Call back   Best Call Back Number:  [phone]  Additional Information:

## 2022-09-08 ENCOUNTER — TELEPHONE (OUTPATIENT)
Dept: BARIATRICS | Facility: CLINIC | Age: 77
End: 2022-09-08
Payer: MEDICARE

## 2022-09-09 ENCOUNTER — TELEPHONE (OUTPATIENT)
Dept: BARIATRICS | Facility: CLINIC | Age: 77
End: 2022-09-09
Payer: MEDICARE

## 2022-09-13 ENCOUNTER — PATIENT OUTREACH (OUTPATIENT)
Dept: ADMINISTRATIVE | Facility: HOSPITAL | Age: 77
End: 2022-09-13
Payer: MEDICARE

## 2022-09-19 ENCOUNTER — TELEPHONE (OUTPATIENT)
Dept: BARIATRICS | Facility: CLINIC | Age: 77
End: 2022-09-19
Payer: MEDICARE

## 2022-09-19 NOTE — TELEPHONE ENCOUNTER
----- Message from [facility] sent at 9/19/2022 12:40 PM CDT -----  Type:  Needs Medical Advice    Who Called: PT   Would the patient rather a call back or a response via [employer]? Callback   Best Call Back Number: [phone]  Additional Information: Pt requesting callback from office to discuss why she wasn't called for appt on 09/09/2022

## 2022-09-20 ENCOUNTER — TELEPHONE (OUTPATIENT)
Dept: BARIATRICS | Facility: CLINIC | Age: 77
End: 2022-09-20
Payer: MEDICARE

## 2022-09-20 NOTE — TELEPHONE ENCOUNTER
----- Message from Ivy Ruiz sent at 9/20/2022 11:46 AM CDT -----  Contact: pt/[phone]  Type:  Patient Returning Call    Who Called:PT   Would the patient rather a call back or a response via [employer]? CALL  Best Call Back Number:[phone]  Additional Information: Pt is looking to  reschedule an  virtual  appointment

## 2022-09-20 NOTE — TELEPHONE ENCOUNTER
Pt stated that she was supposed to have a FC on 9/9/22 and did not receive a call that day.  She called and has not heard back.    I discussed with Cristy Chao who stated that the pt can be scheduled for a medical weight loss consult and has no co-pay.    Called pt back and LVM for RC.  Informed her that I scheduled for the first available with MD, date and time.

## 2022-09-22 ENCOUNTER — ANTI-COAG VISIT (OUTPATIENT)
Dept: CARDIOLOGY | Facility: CLINIC | Age: 77
End: 2022-09-22
Payer: MEDICARE

## 2022-09-22 ENCOUNTER — LAB VISIT (OUTPATIENT)
Dept: LAB | Facility: HOSPITAL | Age: 77
End: 2022-09-22
Attending: UROLOGY
Payer: MEDICARE

## 2022-09-22 DIAGNOSIS — I48.91 ATRIAL FIBRILLATION WITH RVR: ICD-10-CM

## 2022-09-22 DIAGNOSIS — I48.91 ATRIAL FIBRILLATION WITH RVR: Primary | ICD-10-CM

## 2022-09-22 DIAGNOSIS — Z79.01 LONG TERM (CURRENT) USE OF ANTICOAGULANTS: ICD-10-CM

## 2022-09-22 LAB
INR PPP: 1.6 (ref 0.8–1.2)
PROTHROMBIN TIME: 16.5 SEC (ref 9–12.5)

## 2022-09-22 PROCEDURE — 93793 PR ANTICOAGULANT MGMT FOR PT TAKING WARFARIN: ICD-10-PCS | Mod: S$GLB,,,

## 2022-09-22 PROCEDURE — 85610 PROTHROMBIN TIME: CPT | Mod: PO | Performed by: STUDENT IN AN ORGANIZED HEALTH CARE EDUCATION/TRAINING PROGRAM

## 2022-09-22 PROCEDURE — 93793 ANTICOAG MGMT PT WARFARIN: CPT | Mod: S$GLB,,,

## 2022-09-22 PROCEDURE — 36415 COLL VENOUS BLD VENIPUNCTURE: CPT | Mod: PO | Performed by: STUDENT IN AN ORGANIZED HEALTH CARE EDUCATION/TRAINING PROGRAM

## 2022-10-04 ENCOUNTER — OFFICE VISIT (OUTPATIENT)
Dept: CARDIOLOGY | Facility: CLINIC | Age: 77
End: 2022-10-04
Payer: MEDICARE

## 2022-10-04 VITALS
DIASTOLIC BLOOD PRESSURE: 66 MMHG | SYSTOLIC BLOOD PRESSURE: 136 MMHG | OXYGEN SATURATION: 97 % | BODY MASS INDEX: 39.27 KG/M2 | WEIGHT: 208 LBS | HEIGHT: 61 IN | HEART RATE: 61 BPM

## 2022-10-04 DIAGNOSIS — I50.20 HEART FAILURE WITH REDUCED EJECTION FRACTION: ICD-10-CM

## 2022-10-04 DIAGNOSIS — E78.5 HYPERLIPIDEMIA ASSOCIATED WITH TYPE 2 DIABETES MELLITUS: ICD-10-CM

## 2022-10-04 DIAGNOSIS — N18.4 TYPE 2 DIABETES MELLITUS WITH STAGE 4 CHRONIC KIDNEY DISEASE, WITH LONG-TERM CURRENT USE OF INSULIN: ICD-10-CM

## 2022-10-04 DIAGNOSIS — Z79.4 TYPE 2 DIABETES MELLITUS WITH STAGE 4 CHRONIC KIDNEY DISEASE, WITH LONG-TERM CURRENT USE OF INSULIN: ICD-10-CM

## 2022-10-04 DIAGNOSIS — E11.22 TYPE 2 DIABETES MELLITUS WITH STAGE 4 CHRONIC KIDNEY DISEASE, WITH LONG-TERM CURRENT USE OF INSULIN: ICD-10-CM

## 2022-10-04 DIAGNOSIS — I48.91 ATRIAL FIBRILLATION, UNSPECIFIED TYPE: Primary | ICD-10-CM

## 2022-10-04 DIAGNOSIS — E11.69 HYPERLIPIDEMIA ASSOCIATED WITH TYPE 2 DIABETES MELLITUS: ICD-10-CM

## 2022-10-04 PROCEDURE — 3078F PR MOST RECENT DIASTOLIC BLOOD PRESSURE < 80 MM HG: ICD-10-PCS | Mod: CPTII,S$GLB,, | Performed by: INTERNAL MEDICINE

## 2022-10-04 PROCEDURE — 1100F PR PT FALLS ASSESS DOC 2+ FALLS/FALL W/INJURY/YR: ICD-10-PCS | Mod: CPTII,S$GLB,, | Performed by: INTERNAL MEDICINE

## 2022-10-04 PROCEDURE — 1159F PR MEDICATION LIST DOCUMENTED IN MEDICAL RECORD: ICD-10-PCS | Mod: CPTII,S$GLB,, | Performed by: INTERNAL MEDICINE

## 2022-10-04 PROCEDURE — 3075F PR MOST RECENT SYSTOLIC BLOOD PRESS GE 130-139MM HG: ICD-10-PCS | Mod: CPTII,S$GLB,, | Performed by: INTERNAL MEDICINE

## 2022-10-04 PROCEDURE — 3075F SYST BP GE 130 - 139MM HG: CPT | Mod: CPTII,S$GLB,, | Performed by: INTERNAL MEDICINE

## 2022-10-04 PROCEDURE — 99214 OFFICE O/P EST MOD 30 MIN: CPT | Mod: S$GLB,,, | Performed by: INTERNAL MEDICINE

## 2022-10-04 PROCEDURE — 3288F FALL RISK ASSESSMENT DOCD: CPT | Mod: CPTII,S$GLB,, | Performed by: INTERNAL MEDICINE

## 2022-10-04 PROCEDURE — 1160F RVW MEDS BY RX/DR IN RCRD: CPT | Mod: CPTII,S$GLB,, | Performed by: INTERNAL MEDICINE

## 2022-10-04 PROCEDURE — 1159F MED LIST DOCD IN RCRD: CPT | Mod: CPTII,S$GLB,, | Performed by: INTERNAL MEDICINE

## 2022-10-04 PROCEDURE — 1100F PTFALLS ASSESS-DOCD GE2>/YR: CPT | Mod: CPTII,S$GLB,, | Performed by: INTERNAL MEDICINE

## 2022-10-04 PROCEDURE — 1160F PR REVIEW ALL MEDS BY PRESCRIBER/CLIN PHARMACIST DOCUMENTED: ICD-10-PCS | Mod: CPTII,S$GLB,, | Performed by: INTERNAL MEDICINE

## 2022-10-04 PROCEDURE — 3078F DIAST BP <80 MM HG: CPT | Mod: CPTII,S$GLB,, | Performed by: INTERNAL MEDICINE

## 2022-10-04 PROCEDURE — 99999 PR PBB SHADOW E&M-EST. PATIENT-LVL III: ICD-10-PCS | Mod: PBBFAC,,, | Performed by: INTERNAL MEDICINE

## 2022-10-04 PROCEDURE — 1126F PR PAIN SEVERITY QUANTIFIED, NO PAIN PRESENT: ICD-10-PCS | Mod: CPTII,S$GLB,, | Performed by: INTERNAL MEDICINE

## 2022-10-04 PROCEDURE — 99999 PR PBB SHADOW E&M-EST. PATIENT-LVL III: CPT | Mod: PBBFAC,,, | Performed by: INTERNAL MEDICINE

## 2022-10-04 PROCEDURE — 99214 PR OFFICE/OUTPT VISIT, EST, LEVL IV, 30-39 MIN: ICD-10-PCS | Mod: S$GLB,,, | Performed by: INTERNAL MEDICINE

## 2022-10-04 PROCEDURE — 3288F PR FALLS RISK ASSESSMENT DOCUMENTED: ICD-10-PCS | Mod: CPTII,S$GLB,, | Performed by: INTERNAL MEDICINE

## 2022-10-04 PROCEDURE — 1126F AMNT PAIN NOTED NONE PRSNT: CPT | Mod: CPTII,S$GLB,, | Performed by: INTERNAL MEDICINE

## 2022-10-04 NOTE — PROGRESS NOTES
Cardiology Clinic note    Subjective:   Patient ID:  Yajaira Terry is a 77 y.o. female who presents for Afib, HFrEF FUP    HPI:   HFrEF: Denies orthopnea, PND. Has not taken furosemide states for many months. Asymptomatic. Left leg swelling reports after a mechanical fall.    Afib: No palpitations, syncope or near syncope    DM: Managed with PCP    SH Tobacco Quit 2010  FH No premature CAD    Patient Active Problem List    Diagnosis Date Noted    Weakness of both lower extremities 03/30/2022    Balance problems 03/30/2022    At risk for falls 03/30/2022    Hip pain 03/30/2022    Primary osteoarthritis of left knee 07/01/2021    Primary osteoarthritis of right knee 06/25/2021    Class 2 severe obesity due to excess calories with serious comorbidity and body mass index (BMI) of 35.0 to 35.9 in adult 06/16/2021    History of renal cell carcinoma 06/16/2021    Long term (current) use of anticoagulants 02/28/2020    CKD (chronic kidney disease), stage IV 02/27/2020     - history of unilateral nephrectomy due to renal cell carcinoma by Dr Emilia Lenz at [facility] (urology)      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 02/27/2020    Age-related osteoporosis without current pathological fracture 02/27/2020    Atrial fibrillation with RVR 11/23/2019    Chronic HFrEF (heart failure with reduced ejection fraction) 11/23/2019    H/O right nephrectomy 11/23/2019    Type 2 diabetes mellitus, with long-term current use of insulin 11/23/2019    Normocytic anemia 11/23/2019    Mitral regurgitation 11/23/2019    Tricuspid regurgitation 11/23/2019       Patient's Medications   New Prescriptions    No medications on file   Previous Medications    ACETAMINOPHEN (TYLENOL) 325 MG TABLET    Take 325 mg by mouth every 6 (six) hours as needed for Pain.    ALENDRONATE (FOSAMAX) 35 MG TABLET    Take 1 tablet (35 mg total) by mouth once a week.    AMIODARONE (PACERONE) 200 MG TAB    Take 1  "tablet (200 mg total) by mouth once daily.    BLOOD SUGAR DIAGNOSTIC (TRUE METRIX GLUCOSE TEST STRIP) STRP    To check BG 2 times daily, to use with insurance preferred meter    BLOOD-GLUCOSE METER (TRUE METRIX GLUCOSE METER) MISC    1 each by Misc.(Non-Drug; Combo Route) route as needed (to check blood sugar).    BLOOD-GLUCOSE METER KIT    To check BG 2 times daily, to use with insurance preferred meter    CHOLECALCIFEROL, VITAMIN D3, 25 MCG (1,000 UNIT) CHEW    Take by mouth 2 (two) times a day.    CLINDAMYCIN (CLEOCIN) 300 MG CAPSULE    Take 1 capsule (300 mg total) by mouth 3 (three) times daily.    COMBIGAN 0.2-0.5 % DROP    Place 1 drop into both eyes 2 (two) times daily.    DOCUSATE SODIUM (COLACE) 100 MG CAPSULE    Take 100 mg by mouth 2 (two) times daily.    FUROSEMIDE (LASIX) 20 MG TABLET    TAKE 1 TABLET BY MOUTH TWICE A DAY    INSULIN ASPART U-100 (NOVOLOG FLEXPEN U-100 INSULIN) 100 UNIT/ML (3 ML) INPN PEN    INJECT 15 UNITS UNDER THE SKIN THREE TIMES DAILY WITH MEALS.    LANCETS MISC    To check BG 2 times daily, to use with insurance preferred meter    LANTUS SOLOSTAR U-100 INSULIN GLARGINE 100 UNITS/ML (3ML) SUBQ PEN    INJECT 60 UNITS SUBCUTANEOUSLY ONCE DAILY.    LATANOPROST 0.005 % OPHTHALMIC SOLUTION    PLACE 1 DROP INTO LEFT EYE AT BEDTIME    METOPROLOL SUCCINATE (TOPROL-XL) 50 MG 24 HR TABLET    Take 1 tablet (50 mg total) by mouth once daily.    PEN NEEDLE, DIABETIC 33 GAUGE X 5/16" NDLE    1 Units by Misc.(Non-Drug; Combo Route) route 3 (three) times daily as needed (to inject insulin).    PRAVASTATIN (PRAVACHOL) 40 MG TABLET    Take 1 tablet (40 mg total) by mouth every evening.    TRAMADOL (ULTRAM) 50 MG TABLET    TAKE 1 TABLET BY MOUTH EVERY 8 HOURS AS NEEDED FOR PAIN    TRUE METRIX GLUCOSE TEST STRIP STRP    TEST BLOOD SUGAR TWICE DAILY    TRUEPLUS LANCETS 33 GAUGE MISC        VICTOZA 2-PENELOPE 0.6 MG/0.1 ML (18 MG/3 ML) PNIJ PEN    INJECT 0.6 MG UNDER THE SKIN ONCE DAILY    WARFARIN " "(COUMADIN) 3 MG TABLET    TAKE ONE TABLET BY MOUTH DAILY ALTERNATING WITH A HALF OF TABLET   Modified Medications    No medications on file   Discontinued Medications    METOPROLOL TARTRATE (LOPRESSOR) 50 MG TABLET    TAKE 2 TABLETS TWICE A DAY        Review of Systems   Constitutional: Negative for fever.   HENT:  Negative for nosebleeds.    Cardiovascular:  Negative for chest pain, dyspnea on exertion, near-syncope, orthopnea, palpitations, paroxysmal nocturnal dyspnea and syncope.        As above   Respiratory:  Negative for hemoptysis.    Hematologic/Lymphatic: Negative for bleeding problem.   Skin:  Negative for poor wound healing.   Gastrointestinal:  Negative for hematochezia.   Genitourinary:  Negative for hematuria.   Neurological:  Negative for light-headedness.   Allergic/Immunologic: Negative for persistent infections.       Objective:   Vitals  Vitals:    10/04/22 1200   BP: 136/66   Pulse: 61   SpO2: 97%   Weight: 94.3 kg (208 lb)   Height: 5' 1" (1.549 m)            Physical Exam  Constitutional:       General: She is not in acute distress.     Appearance: She is well-developed. She is not diaphoretic.   HENT:      Head: Normocephalic.   Neck:      Vascular: JVD: Unable to assess d/t body habitus.   Cardiovascular:      Rate and Rhythm: Regular rhythm. Bradycardia present.      Heart sounds: No murmur heard.    No friction rub. No gallop.   Pulmonary:      Effort: Pulmonary effort is normal. No respiratory distress.      Breath sounds: Normal breath sounds.   Abdominal:      Palpations: Abdomen is soft.      Tenderness: There is no abdominal tenderness.   Musculoskeletal:         General: Swelling: L>R.      Cervical back: Normal range of motion.   Skin:     General: Skin is warm.   Neurological:      Mental Status: She is alert.   Psychiatric:         Mood and Affect: Mood normal.           Assessment:     1. Atrial fibrillation, unspecified type    2. Heart failure with reduced ejection fraction  " "  3. Type 2 diabetes mellitus with stage 4 chronic kidney disease, with long-term current use of insulin    4. Hyperlipidemia associated with type 2 diabetes mellitus        Plan:   Yajaira Terry is a 77 y.o. female h/o Afib, HFrEF, DM, HLD    - EKG personally reviewed. My interpretation:  5/3/22: SB 40s, normal axis. ST depression inferolateral leads. Qtc 455    Atrial Fibrillation  - sp DCCV April 2022  - CHADS2-VASc 5 (CHF, Age x2, DM, female)  - HASBLED 1 (Age)  - Coumadin. Followed by coumadin clinic  Estimated body mass index is 39.3 kg/m² as calculated from the following:    Height as of this encounter: 5' 1" (1.549 m).    Weight as of this encounter: 94.3 kg (208 lb).  77 y.o.  Lab Results   Component Value Date    CREATININE 1.23 08/03/2022    AST 42 08/03/2022    BILITOT 0.8 08/03/2022   - Amiodarone    HFrEF, improved  - NYHA II  - Echo August 2022  The left ventricle is moderately enlarged with normal systolic function.  The estimated ejection fraction is 55%.  Normal left ventricular diastolic function.  Mild-to-moderate mitral regurgitation.  Mild tricuspid regurgitation.  Mild to moderate pulmonic regurgitation.  Normal right ventricular size with normal right ventricular systolic function.  There is no pulmonary hypertension.  - Echo Oct 2021: LVEF 30%, DD. Normal RVSF. Mild-mod MR. Mild TR. Mod VA. PASP 21, CVP 3  - Echo 2020: LVEF 20-25%. Afib with restrictive filling pattern. GHK, local segmental akinesis of AS-anterior LV wall. Mild LAE. Mild-mod MR, mild TR. PASP 21, CVP 3  - Cath 2017: Normal coronary arteries  - Unable to start ARB/ACEi or hydralazine/ISMN d/t hypotension  - Metoprolol succinate  Lab Results   Component Value Date    CREATININE 1.23 08/03/2022    K 4.2 08/03/2022   - Furosemide- asked to take as needed for LE swelling  - Daily weights  - First thing in the morning: Pee, take off clothes, step on the scale.  - Write down the date, and the weight. Maintain this chart " everyday  - If weight increases by > 3 lbs in a day, or > 5 lbs in a week, take an extra pill of furosemide. Call the office.  - If worsening symptoms, go to the ED  - Salt restriction    DM  Lab Results   Component Value Date    HGBA1C 7.2 (H) 03/11/2022   Managed by PCP    HLD  Lab Results   Component Value Date    LDLCALC 72.6 03/29/2021   Statin as above      Continue with current medical plan and lifestyle changes.    No orders of the defined types were placed in this encounter.      Follow up as scheduled  Return sooner for concerns or questions. If symptoms persist go to the ED    She expressed verbal understanding and agreed with the plan      Anila Patrick MD  Interventional Cardiology  [facility]  Phone: [phone]

## 2022-10-06 ENCOUNTER — ANTI-COAG VISIT (OUTPATIENT)
Dept: CARDIOLOGY | Facility: CLINIC | Age: 77
End: 2022-10-06
Payer: MEDICARE

## 2022-10-06 ENCOUNTER — LAB VISIT (OUTPATIENT)
Dept: LAB | Facility: HOSPITAL | Age: 77
End: 2022-10-06
Attending: STUDENT IN AN ORGANIZED HEALTH CARE EDUCATION/TRAINING PROGRAM
Payer: MEDICARE

## 2022-10-06 DIAGNOSIS — I48.91 ATRIAL FIBRILLATION WITH RVR: Primary | ICD-10-CM

## 2022-10-06 DIAGNOSIS — Z79.01 LONG TERM (CURRENT) USE OF ANTICOAGULANTS: ICD-10-CM

## 2022-10-06 DIAGNOSIS — I48.91 ATRIAL FIBRILLATION WITH RVR: ICD-10-CM

## 2022-10-06 LAB
INR PPP: 1.3 (ref 0.8–1.2)
PROTHROMBIN TIME: 14.1 SEC (ref 9–12.5)

## 2022-10-06 PROCEDURE — 36415 COLL VENOUS BLD VENIPUNCTURE: CPT | Mod: PO | Performed by: STUDENT IN AN ORGANIZED HEALTH CARE EDUCATION/TRAINING PROGRAM

## 2022-10-06 PROCEDURE — 93793 ANTICOAG MGMT PT WARFARIN: CPT | Mod: S$GLB,,,

## 2022-10-06 PROCEDURE — 85610 PROTHROMBIN TIME: CPT | Mod: PO | Performed by: STUDENT IN AN ORGANIZED HEALTH CARE EDUCATION/TRAINING PROGRAM

## 2022-10-06 PROCEDURE — 93793 PR ANTICOAGULANT MGMT FOR PT TAKING WARFARIN: ICD-10-PCS | Mod: S$GLB,,,

## 2022-10-06 NOTE — PROGRESS NOTES
Patient called 10/06/22 for results and advised. She refuse to go back on 10/12/22. She sais she will wait about 2 weeks and will let us know.

## 2022-10-07 NOTE — PROGRESS NOTES
10/7/22-Messaged Dr. Vargas regarding alternative therapy for pt.  She is not an ideal warfarin candidate due to compliance and unwillingness to f/u.  LMFCB w/ pt today to review.

## 2022-10-11 ENCOUNTER — LAB VISIT (OUTPATIENT)
Dept: LAB | Facility: HOSPITAL | Age: 77
End: 2022-10-11
Attending: STUDENT IN AN ORGANIZED HEALTH CARE EDUCATION/TRAINING PROGRAM
Payer: MEDICARE

## 2022-10-11 DIAGNOSIS — Z79.01 LONG TERM (CURRENT) USE OF ANTICOAGULANTS: ICD-10-CM

## 2022-10-11 DIAGNOSIS — I48.91 ATRIAL FIBRILLATION WITH RVR: ICD-10-CM

## 2022-10-11 LAB
INR PPP: 1.6 (ref 0.8–1.2)
PROTHROMBIN TIME: 16.7 SEC (ref 9–12.5)

## 2022-10-11 PROCEDURE — 36415 COLL VENOUS BLD VENIPUNCTURE: CPT | Mod: PO | Performed by: STUDENT IN AN ORGANIZED HEALTH CARE EDUCATION/TRAINING PROGRAM

## 2022-10-11 PROCEDURE — 85610 PROTHROMBIN TIME: CPT | Mod: PO | Performed by: STUDENT IN AN ORGANIZED HEALTH CARE EDUCATION/TRAINING PROGRAM

## 2022-10-11 NOTE — PROGRESS NOTES
10/11/22  Patient called and rescheduled 10/12 lab appointment to today 10/11, she reported she noticed slight bleed per rectum--unsure if last night or this am

## 2022-10-12 ENCOUNTER — ANTI-COAG VISIT (OUTPATIENT)
Dept: CARDIOLOGY | Facility: CLINIC | Age: 77
End: 2022-10-12
Payer: MEDICARE

## 2022-10-12 DIAGNOSIS — I48.91 ATRIAL FIBRILLATION WITH RVR: Primary | ICD-10-CM

## 2022-10-12 DIAGNOSIS — Z79.01 LONG TERM (CURRENT) USE OF ANTICOAGULANTS: ICD-10-CM

## 2022-10-12 PROCEDURE — 93793 PR ANTICOAGULANT MGMT FOR PT TAKING WARFARIN: ICD-10-PCS | Mod: S$GLB,,,

## 2022-10-12 PROCEDURE — 93793 ANTICOAG MGMT PT WARFARIN: CPT | Mod: S$GLB,,,

## 2022-10-13 LAB
LEFT EYE DM RETINOPATHY: POSITIVE
RIGHT EYE DM RETINOPATHY: POSITIVE

## 2022-10-17 ENCOUNTER — LAB VISIT (OUTPATIENT)
Dept: LAB | Facility: HOSPITAL | Age: 77
End: 2022-10-17
Attending: STUDENT IN AN ORGANIZED HEALTH CARE EDUCATION/TRAINING PROGRAM
Payer: MEDICARE

## 2022-10-17 DIAGNOSIS — Z79.01 LONG TERM (CURRENT) USE OF ANTICOAGULANTS: ICD-10-CM

## 2022-10-17 DIAGNOSIS — I48.91 ATRIAL FIBRILLATION WITH RVR: ICD-10-CM

## 2022-10-17 LAB
INR PPP: 2.3 (ref 0.8–1.2)
PROTHROMBIN TIME: 23.8 SEC (ref 9–12.5)

## 2022-10-17 PROCEDURE — 85610 PROTHROMBIN TIME: CPT | Mod: PO | Performed by: STUDENT IN AN ORGANIZED HEALTH CARE EDUCATION/TRAINING PROGRAM

## 2022-10-17 PROCEDURE — 36415 COLL VENOUS BLD VENIPUNCTURE: CPT | Mod: PO | Performed by: STUDENT IN AN ORGANIZED HEALTH CARE EDUCATION/TRAINING PROGRAM

## 2022-10-18 ENCOUNTER — ANTI-COAG VISIT (OUTPATIENT)
Dept: CARDIOLOGY | Facility: CLINIC | Age: 77
End: 2022-10-18
Payer: MEDICARE

## 2022-10-18 ENCOUNTER — PATIENT OUTREACH (OUTPATIENT)
Dept: ADMINISTRATIVE | Facility: HOSPITAL | Age: 77
End: 2022-10-18
Payer: MEDICARE

## 2022-10-18 DIAGNOSIS — I48.91 ATRIAL FIBRILLATION WITH RVR: Primary | ICD-10-CM

## 2022-10-18 DIAGNOSIS — Z79.01 LONG TERM (CURRENT) USE OF ANTICOAGULANTS: ICD-10-CM

## 2022-10-18 PROCEDURE — 93793 ANTICOAG MGMT PT WARFARIN: CPT | Mod: S$GLB,,,

## 2022-10-18 PROCEDURE — 93793 PR ANTICOAGULANT MGMT FOR PT TAKING WARFARIN: ICD-10-PCS | Mod: S$GLB,,,

## 2022-10-25 ENCOUNTER — LAB VISIT (OUTPATIENT)
Dept: LAB | Facility: HOSPITAL | Age: 77
End: 2022-10-25
Attending: STUDENT IN AN ORGANIZED HEALTH CARE EDUCATION/TRAINING PROGRAM
Payer: MEDICARE

## 2022-10-25 DIAGNOSIS — Z79.01 LONG TERM (CURRENT) USE OF ANTICOAGULANTS: ICD-10-CM

## 2022-10-25 DIAGNOSIS — I48.91 ATRIAL FIBRILLATION WITH RVR: ICD-10-CM

## 2022-10-25 LAB
INR PPP: 2.6 (ref 0.8–1.2)
PROTHROMBIN TIME: 27 SEC (ref 9–12.5)

## 2022-10-25 PROCEDURE — 85610 PROTHROMBIN TIME: CPT | Mod: PO | Performed by: STUDENT IN AN ORGANIZED HEALTH CARE EDUCATION/TRAINING PROGRAM

## 2022-10-25 PROCEDURE — 36415 COLL VENOUS BLD VENIPUNCTURE: CPT | Mod: PO | Performed by: STUDENT IN AN ORGANIZED HEALTH CARE EDUCATION/TRAINING PROGRAM

## 2022-10-26 ENCOUNTER — ANTI-COAG VISIT (OUTPATIENT)
Dept: CARDIOLOGY | Facility: CLINIC | Age: 77
End: 2022-10-26
Payer: MEDICARE

## 2022-10-26 DIAGNOSIS — Z79.01 LONG TERM (CURRENT) USE OF ANTICOAGULANTS: ICD-10-CM

## 2022-10-26 DIAGNOSIS — I48.91 ATRIAL FIBRILLATION WITH RVR: Primary | ICD-10-CM

## 2022-10-26 PROCEDURE — 93793 ANTICOAG MGMT PT WARFARIN: CPT | Mod: S$GLB,,,

## 2022-10-26 PROCEDURE — 93793 PR ANTICOAGULANT MGMT FOR PT TAKING WARFARIN: ICD-10-PCS | Mod: S$GLB,,,

## 2022-10-26 NOTE — PROGRESS NOTES
INR at goal. Medications and chart reviewed. No changes noted to necessitate adjustment of warfarin or follow-up plan. See calendar.    Update 11/1: Her INRs were low on her old dose and good on the new dose. That's why she needs to stay on new dose. If she would like, we can repeat INR this week vs waiting 2 weeks. But based on the trends this new dose looks good for now.

## 2022-10-27 ENCOUNTER — TELEPHONE (OUTPATIENT)
Dept: FAMILY MEDICINE | Facility: CLINIC | Age: 77
End: 2022-10-27
Payer: MEDICARE

## 2022-10-27 NOTE — TELEPHONE ENCOUNTER
Contacted pt - She states that she has had shoulder pain in the past and had to get fluid drained off of it.    Pt has an apt in the am.      ---------------------------------------------------------------------        ----- Message from Dasha Robb sent at 10/27/2022 11:17 AM CDT -----  Regarding: call back  Contact: [phone]  Type:  Same Day Appointment Request    Caller is requesting a same day appointment.  Caller declined first available appointment listed below.    Name of Caller: PT   When is the first available appointment? Next week   Symptoms: severe pain on her right shoulders   Best Call Back Number: [phone]  Additional Information:

## 2022-10-27 NOTE — TELEPHONE ENCOUNTER
Keily Knapp Staff  Caller: Unspecified (Today, 11:44 AM)  Type:  Needs Medical Advice     Who Called:  pt   Symptoms (please be specific):  wants to get back from [place] she said she was on the line and phone was disconnected       Would the patient rather a call back or a response via [facility]?  Call   Best Call Back Number:  [phone]   Additional Information:

## 2022-10-28 ENCOUNTER — TELEPHONE (OUTPATIENT)
Dept: FAMILY MEDICINE | Facility: CLINIC | Age: 77
End: 2022-10-28

## 2022-10-28 ENCOUNTER — NURSE TRIAGE (OUTPATIENT)
Dept: ADMINISTRATIVE | Facility: CLINIC | Age: 77
End: 2022-10-28
Payer: MEDICARE

## 2022-10-28 NOTE — TELEPHONE ENCOUNTER
----- Message from Mora Mendez sent at 10/28/2022  9:16 AM CDT -----  Regarding: sameday  Contact: [phone]  Type:  Same Day Appointment Request    Caller is requesting a same day appointment.  Caller declined first available appointment listed below.    Name of Caller: self   When is the first available appointment?   Symptoms: severe shoulder pain   Best Call Back Number: [phone]  Additional Information:  her cab cancelled

## 2022-10-29 NOTE — TELEPHONE ENCOUNTER
"Patient states her ankles are swollen bilaterally and the left is worse than the right. She is calling because her left foot went numb, but she now has full feeling back. She states they recently decreased her coumadin dose and she wants to know if it could be related to her medications. She also states she does not take her "fluid pills" the way they are prescribed. She states she only takes it about once a week because she does not want to get dehydrated. She did take it yesterday. She also complains of her shoulder being swollen, but spoke with her PCP today who referred her to the orthopedic group. Care advice is to be seen within 4 hours. Advised to go to the nearest ED. Patient stated she is not going to go; urged to go based on symptoms and explained severity of symptoms. Unsure if patient will follow disposition.     Reason for Disposition   [1] Thigh, calf, or ankle swelling AND [2] bilateral AND [3] 1 side is more swollen    Additional Information   Swelling of both ankles (i.e., pedal edema)   Negative: SEVERE difficulty breathing (e.g., struggling for each breath, speaks in single words)   Negative: Looks like a broken bone or dislocated joint (e.g., crooked or deformed)   Negative: Sounds like a life-threatening emergency to the triager   Negative: Difficulty breathing at rest   Negative: Entire foot is cool or blue in comparison to other side   Negative: [1] Can't walk or can barely walk AND [2] new-onset   Negative: [1] Difficulty breathing with exertion (e.g., walking) AND [2] new-onset or worsening   Negative: [1] Red area or streak AND [2] fever   Negative: [1] Swelling is painful to touch AND [2] fever   Negative: [1] Cast on leg or ankle AND [2] now increased pain   Negative: Patient sounds very sick or weak to the triager   Negative: SEVERE leg swelling (e.g., swelling extends above knee, entire leg is swollen, weeping fluid)   Negative: [1] Red area or streak [2] large (> 2 in. or 5 cm)   " Negative: [1] Thigh or calf pain AND [2] only 1 side AND [3] present > 1 hour   Negative: [1] Thigh, calf, or ankle swelling AND [2] only 1 side    Protocols used: Ankle Swelling-A-AH, Leg Swelling and Edema-A-AH

## 2022-10-31 ENCOUNTER — TELEPHONE (OUTPATIENT)
Dept: CARDIOLOGY | Facility: CLINIC | Age: 77
End: 2022-10-31
Payer: MEDICARE

## 2022-10-31 NOTE — TELEPHONE ENCOUNTER
Called pt to follow up in regards to  triage message office received  No answer    Left detailed voice message as well as call back number    ND

## 2022-11-01 ENCOUNTER — TELEPHONE (OUTPATIENT)
Dept: CARDIOLOGY | Facility: CLINIC | Age: 77
End: 2022-11-01
Payer: MEDICARE

## 2022-11-01 DIAGNOSIS — R60.9 SWELLING: Primary | ICD-10-CM

## 2022-11-01 NOTE — PROGRESS NOTES
11/01/22  Patient called requesting to speak with PharmD, unable to understand why if INR was within range last 2 times she's still having to take 1 tablet (3mg) daily except 1/2  tablet (1.5mg) on Monday when for 3 years she was taking 1 tablet (3mg) for 3 days then 1/2 tablet (1.5mg) all other days, Patient's call back # [phone]

## 2022-11-01 NOTE — TELEPHONE ENCOUNTER
Called pt back in regards to this message.      Pt stated went to ED for left leg numbness and pain  Stated leg was swollen  Never saw a provider  No testing was performed    Is concerned she has a blood clot     No severe numbness today and some slight swelling    Pt would like to follow up with Dr. Patrick  Informed pt next available monica with Dr. Patrick at the [facility] location is December 20th    Pt would like to know if she can have an US of leg to determine if there is a blood clot    Informed pt will send a message to Dr. Patrick to please advise    ND

## 2022-11-01 NOTE — PROGRESS NOTES
Spoke to patient regarding warfarin dosing. Warfarin dosing reviewed with patient per calendar. It was explained to the patient, that because her INR was below goal, it was necessary to increase her dosage, so that her INR would return within range. Once the dosage was reviewed, week by week, the patient gained clarity on the need for a dosing increase.

## 2022-11-01 NOTE — TELEPHONE ENCOUNTER
----- Message from Elisabeth Rubin sent at 11/1/2022  8:29 AM CDT -----  Type:  Sooner Appointment Request    Caller is requesting a sooner appointment.  Caller declined first available appointment listed below.  Caller will not accept being placed on the waitlist and is requesting a message be sent to doctor.    Name of Caller: patient   When is the first available appointment? 12/20/22  Symptoms: Follow up/Discuss results of US, swollen feet and numbness  Would the patient rather a call back or a response via [facility]? Call   Best Call Back Number: [phone]  Additional Information:  Please assist, thank you!

## 2022-11-02 NOTE — TELEPHONE ENCOUNTER
Called pt to follow up in regards to scheduling an appt  No answer  Left detailed voice message    ND

## 2022-11-03 DIAGNOSIS — M75.101 ROTATOR CUFF SYNDROME OF RIGHT SHOULDER: Primary | ICD-10-CM

## 2022-11-08 ENCOUNTER — HOSPITAL ENCOUNTER (OUTPATIENT)
Dept: RADIOLOGY | Facility: HOSPITAL | Age: 77
Discharge: HOME | End: 2022-11-08
Attending: INTERNAL MEDICINE
Payer: MEDICARE

## 2022-11-08 DIAGNOSIS — R60.9 SWELLING: ICD-10-CM

## 2022-11-09 ENCOUNTER — ANTI-COAG VISIT (OUTPATIENT)
Dept: CARDIOLOGY | Facility: CLINIC | Age: 77
End: 2022-11-09
Payer: MEDICARE

## 2022-11-09 DIAGNOSIS — I48.91 ATRIAL FIBRILLATION WITH RVR: Primary | ICD-10-CM

## 2022-11-09 DIAGNOSIS — Z79.01 LONG TERM (CURRENT) USE OF ANTICOAGULANTS: ICD-10-CM

## 2022-11-09 PROCEDURE — 93793 ANTICOAG MGMT PT WARFARIN: CPT | Mod: S$GLB,,,

## 2022-11-09 PROCEDURE — 93793 PR ANTICOAGULANT MGMT FOR PT TAKING WARFARIN: ICD-10-PCS | Mod: S$GLB,,,

## 2022-11-14 RX ORDER — FUROSEMIDE 20 MG/1
20 TABLET ORAL 2 TIMES DAILY
Qty: 180 TABLET | Refills: 0 | Status: SHIPPED | OUTPATIENT
Start: 2022-11-14 | End: 2023-08-16

## 2022-11-14 NOTE — TELEPHONE ENCOUNTER
Care Due:                  Date            Visit Type   Department     Provider  --------------------------------------------------------------------------------                                ESTABLISHED  Last Visit: 11-      PATIENT      None Found     Ra Vargas  Next Visit: None Scheduled  None         None Found                                                            Last  Test          Frequency    Reason                     Performed    Due Date  --------------------------------------------------------------------------------    Office Visit  12 months..  YOHAN MOFFETT, insulin,   11-   10-                             pravastatin..............    HBA1C.......  6 months...  YOHAN MOFFETT, insulin.  03- 09-    Lipid Panel.  12 months..  pravastatin..............  03- 03-    [facility] Embedded Care Gaps. Reference number: [id number]. 11/14/2022   4:30:14 PM CST

## 2022-11-14 NOTE — TELEPHONE ENCOUNTER
----- Message from Keily Willoughby sent at 11/14/2022  4:18 PM CST -----  Type:  RX Refill Request    Who Called:  [facility] pharmacy  Refill or New Rx: refill   RX Name and Strength:furosemide (LASIX) 20 MG tablet  How is the patient currently taking it? (ex. 1XDay):TAKE 1 TABLET BY MOUTH TWICE A DAY  Is this a 30 day or 90 day RX:180  Preferred Pharmacy with phone number:[facility] - [facility] [address]   Phone: [phone]  Fax:  [phone]  Local or Mail Order:mail  Ordering Provider:Dr. Vargas  Would the patient rather a call back or a response via [facility]?  call  Best Call Back Number:   Additional Information:      Refill or New Rx: refill   RX Name and Strength:alendronate (FOSAMAX) 35 MG tablet  How is the patient currently taking it? (ex. 1XDay):Take 1 tablet (35 mg total) by mouth once a week  Is this a 30 day or 90 day RX:90    Refill or New Rx: refill   RX Name and Strength:warfarin (COUMADIN) 3 MG tablet  How is the patient currently taking it? (ex. 1XDay): TAKE ONE TABLET BY MOUTH DAILY ALTERNATING WITH A HALF OF TABLET  Is this a 30 day or 90 day RX: 90        Refill or New Rx: refill   RX Name and Strength:VICTOZA 2-PENELOPE 0.6 mg/0.1 mL (18 mg/3 mL) PnIj pen  How is the patient currently taking it? (ex. 1XDay): INJECT 0.6 MG UNDER THE SKIN ONCE DAILY  Is this a 30 day or 90 day RX:6 pen

## 2022-11-16 RX ORDER — TRAMADOL HYDROCHLORIDE 50 MG/1
TABLET ORAL
Qty: 90 TABLET | Refills: 5 | OUTPATIENT
Start: 2022-11-16

## 2022-11-16 NOTE — TELEPHONE ENCOUNTER
No new care gaps identified.  [facility] Embedded Care Gaps. Reference number: [id number]. 11/16/2022   10:17:39 AM CST

## 2022-11-16 NOTE — TELEPHONE ENCOUNTER
----- Message from Priteshtonya Mathis sent at 11/16/2022 10:10 AM CST -----  Contact: PT  Type: Requesting refill    Who Called: PT    Regarding: traMADoL (ULTRAM) 50 mg tablet 90 tablet   Sig: TAKE 1 TABLET BY MOUTH EVERY 8 HOURS AS NEEDED FOR PAIN    Would the patient rather a call back or a response via [facility]? Call back    Best Call Back Number: [phone]    Pharmacy Information: [facility] - [place] [address]   Phone: [phone]  Fax:  [phone]    Additional: PT states all medications will be sent to [facility] moving forward. Please look out for a prescription request from them,

## 2022-11-17 ENCOUNTER — TELEPHONE (OUTPATIENT)
Dept: FAMILY MEDICINE | Facility: CLINIC | Age: 77
End: 2022-11-17
Payer: MEDICARE

## 2022-11-17 RX ORDER — TRAMADOL HYDROCHLORIDE 50 MG/1
50 TABLET ORAL EVERY 8 HOURS PRN
Qty: 90 TABLET | Refills: 5 | OUTPATIENT
Start: 2022-11-17

## 2022-11-17 NOTE — TELEPHONE ENCOUNTER
----- Message from Bertha Brower sent at 11/17/2022  2:32 PM CST -----  Type:  RX Refill Request    Who Called: pt  Refill or New Rx:refill  RX Name and Strength:traMADoL (ULTRAM) 50 mg tablet  How is the patient currently taking it? (ex. 1XDay):TAKE 1 TABLET BY MOUTH EVERY 8 HOURS AS NEEDED FOR PAIN  Is this a 30 day or 90 day RX:90  Preferred Pharmacy with phone number:[facility] - [facility] [address] [facility]  [address] [facility] [address]  Phone: [phone] Fax: [phone]      Local or Mail Order:mail  Ordering Provider:Dr Ra Vargas  Would the patient rather a call back or a response via [facility]? call  Best Call Back Number:[phone]  Additional Information:         Would the patient rather a call back or a response via [facility]? Call back   Best Call Back Number: [phone]   Additional Information: [facility]/pharmacy #[id number] - [address] AT [facility]   Phone: [phone]   Fax: [phone]

## 2022-11-17 NOTE — TELEPHONE ENCOUNTER
No new care gaps identified.  [facility] Embedded Care Gaps. Reference number: [id number]. 11/17/2022   2:46:21 PM CST

## 2022-11-17 NOTE — TELEPHONE ENCOUNTER
----- Message from Pritesh Mathis sent at 11/17/2022  2:37 PM CST -----  Contact: pt  Type: Requesting REFILL         Who Called: PT  Regarding: traMADoL (ULTRAM) 50 mg tablet 90 tablet   Sig: TAKE 1 TABLET BY MOUTH EVERY 8 HOURS AS NEEDED FOR PAIN      Would the patient rather a call back or a response via [facility]? Call back  Best Call Back Number: [phone]  Additional Information: [facility]/pharmacy #[id number] - [address] AT [facility]   Phone: [phone]  Fax:  [phone]

## 2022-11-17 NOTE — LETTER
November 18, 2022    Yajaira Terry  [address]  [place] [address]             [facility]  [address] [address]  Phone: [phone]  Fax: [phone] Ms Gates,    I have left you several messages to call the office. Dr Vargas needs to see you prior to any further refills.   Please call the office at [phone] to schedule this appointment.    Sincerely ,      Alanna

## 2022-11-17 NOTE — TELEPHONE ENCOUNTER
I Lm for the pt to rtn call to discuss what pharmacy ?  Local or mail order    I received 2 messages for same med different pharmacy

## 2022-11-20 ENCOUNTER — TELEPHONE (OUTPATIENT)
Dept: FAMILY MEDICINE | Facility: CLINIC | Age: 77
End: 2022-11-20
Payer: MEDICARE

## 2022-11-20 NOTE — TELEPHONE ENCOUNTER
DUPLICATE MESSAGE.     ----- Message from Pritesh Mathis sent at 11/18/2022  4:54 PM CST -----  Contact: PT  Type: Requesting REFILL    Who Called: PT    Regarding: traMADoL (ULTRAM) 50 mg tablet 90 tablet    Sig: TAKE 1 TABLET BY MOUTH EVERY 8 HOURS AS NEEDED FOR PAIN    Would the patient rather a call back or a response via [facility]? Call back    Best Call Back Number: [phone]    PHARMACY Information: [facility]/pharmacy #[id number] - [address] AT [facility]   Phone: [phone]  Fax:  [phone]

## 2022-11-22 RX ORDER — ALENDRONATE SODIUM 35 MG/1
35 TABLET ORAL WEEKLY
Qty: 12 TABLET | Refills: 1 | OUTPATIENT
Start: 2022-11-22

## 2022-11-23 ENCOUNTER — TELEPHONE (OUTPATIENT)
Dept: FAMILY MEDICINE | Facility: CLINIC | Age: 77
End: 2022-11-23
Payer: MEDICARE

## 2022-11-23 ENCOUNTER — ANTI-COAG VISIT (OUTPATIENT)
Dept: CARDIOLOGY | Facility: CLINIC | Age: 77
End: 2022-11-23
Payer: MEDICARE

## 2022-11-23 ENCOUNTER — PES CALL (OUTPATIENT)
Dept: ADMINISTRATIVE | Facility: OTHER | Age: 77
End: 2022-11-23
Payer: MEDICARE

## 2022-11-23 DIAGNOSIS — I48.91 ATRIAL FIBRILLATION WITH RVR: Primary | ICD-10-CM

## 2022-11-23 DIAGNOSIS — Z79.01 LONG TERM (CURRENT) USE OF ANTICOAGULANTS: ICD-10-CM

## 2022-11-23 PROCEDURE — 93793 PR ANTICOAGULANT MGMT FOR PT TAKING WARFARIN: ICD-10-PCS | Mod: S$GLB,,,

## 2022-11-23 PROCEDURE — 93793 ANTICOAG MGMT PT WARFARIN: CPT | Mod: S$GLB,,,

## 2022-11-23 NOTE — TELEPHONE ENCOUNTER
----- Message from Mora Mendez sent at 11/23/2022  9:22 AM CST -----  Regarding: refill  Contact: [phone]  Type:  RX Refill Request    Who Called:  self   Refill or New Rx: refill  RX Name and Strength: traMADoL (ULTRAM) 50 mg tablet  How is the patient currently taking it? (ex. 1XDay): TAKE 1 TABLET BY MOUTH EVERY 8 HOURS AS NEEDED FOR PAIN  Is this a 30 day or 90 day RX: 90 tablets  Preferred Pharmacy with phone number: [facility]/PHARMACY #[id number] - [place], [place] - [address] [place] [address]  Local or Mail Order: local   Ordering Provider:   Would the patient rather a call back or a response via [facility]?  Call   Best Call Back Number: [phone]  Additional Information:  please call to discuss why she doesn't have a refill

## 2022-11-23 NOTE — TELEPHONE ENCOUNTER
Per Kat Pena from the call center    to cx the msg for Tramadol she figure it out and [facility] has it . She is going to wait.    PT STILL NEEDS TO MAKE A APPT. BEEN OVER A YEAR SINCE LOV. LM FOR PT TO CALL BACK

## 2022-11-23 NOTE — TELEPHONE ENCOUNTER
LM for pt to call back clinic to go over below. MD is requesting for pt to be seen clinic before refilling tramadol

## 2022-11-25 ENCOUNTER — TELEPHONE (OUTPATIENT)
Dept: FAMILY MEDICINE | Facility: CLINIC | Age: 77
End: 2022-11-25
Payer: MEDICARE

## 2022-11-25 NOTE — TELEPHONE ENCOUNTER
----- Message from Pritesh Mathis sent at 11/25/2022 12:26 PM CST -----  Contact: pt  Type: Requesting to speak with nurse        Who Called: PT  Regarding: wanted the office to know states she have an appt scheduled for 12/14  Would the patient rather a call back or a response via [facility]? Call back  Best Call Back Number:   Additional Information: [phone]

## 2022-11-25 NOTE — TELEPHONE ENCOUNTER
----- Message from Bertha Brower sent at 11/25/2022 11:25 AM CST -----  Type:  Patient Returning Call    Who Called:pt  Who Left Message for Patient:office  Does the patient know what this is regarding?:medication  Would the patient rather a call back or a response via [facility]? call  Best Call Back Number:[phone]  Additional Information:

## 2022-11-25 NOTE — TELEPHONE ENCOUNTER
Returned pt's call. Left detailed message on pt's personalized vmail that it's time for her to be seen in clinic.     4th attempt. See previous encounter.

## 2022-11-30 NOTE — TELEPHONE ENCOUNTER
----- Message from Precious Olguin sent at 11/30/2022  4:38 PM CST -----  Needs advice from nurse:      Who Called:Robert  Regarding:needs refill on Tramadol 50 mg tabs  Would the patient rather a call back or VIA [facility]?  Best Call Back number:  Additional Info:

## 2022-12-01 RX ORDER — TRAMADOL HYDROCHLORIDE 50 MG/1
50 TABLET ORAL EVERY 8 HOURS PRN
Qty: 90 TABLET | Refills: 5 | OUTPATIENT
Start: 2022-12-01

## 2022-12-01 NOTE — TELEPHONE ENCOUNTER
----- Message from Adelina Campbell Patient Care Assistant sent at 12/1/2022  4:39 PM CST -----  Type:  Needs Medical Advice    Who Called:  pt  Symptoms (please be specific):  Patient would liek a call back regarding the update on her  traMADoL (ULTRAM) 50 mg tablet medication   Would the patient rather a call back or a response via [facility]?  Call   Best Call Back Number:  [phone]   Additional Information: patient have an appt on 12/14

## 2022-12-05 NOTE — TELEPHONE ENCOUNTER
----- Message from Priscilla Hickman sent at 12/5/2022  4:27 PM CST -----  Type:  Needs Medical Advice    Who Called: patient  Would the patient rather a call back or a response via [facility]? call  Best Call Back Number: [phone]  Additional Information: She has an upcoming appointment; she is hoping this medication filled prior to her appointment.  traMADoL (ULTRAM) 50 mg tablet 90 tablet 5 4/8/2022  No  Sig: TAKE 1 TABLET BY MOUTH EVERY 8 HOURS AS NEEDED FOR PAIN

## 2022-12-05 NOTE — TELEPHONE ENCOUNTER
No new care gaps identified.  [facility] Embedded Care Gaps. Reference number: [id number]. 12/05/2022   9:18:34 AM CST

## 2022-12-06 ENCOUNTER — TELEPHONE (OUTPATIENT)
Dept: FAMILY MEDICINE | Facility: CLINIC | Age: 77
End: 2022-12-06
Payer: MEDICARE

## 2022-12-06 RX ORDER — TRAMADOL HYDROCHLORIDE 50 MG/1
TABLET ORAL
Qty: 90 TABLET | Refills: 0 | OUTPATIENT
Start: 2022-12-06

## 2022-12-06 NOTE — TELEPHONE ENCOUNTER
----- Message from Radha Rodriguez sent at 12/6/2022  2:27 PM CST -----  Contact: Patient  Patient call in requesting message to be sent to  office requesting a call from nurse regarding prescription     Please assist      Patient can be reach at [phone]

## 2022-12-06 NOTE — TELEPHONE ENCOUNTER
----- Message from Mora Mendez sent at 12/6/2022 10:32 AM CST -----  Regarding: change to local pharm  Contact: [phone]  Patient is requesting a call back regarding moving her rx for:   traMADoL (ULTRAM) 50 mg tablets  Would the patient rather a call back or a response via [facility]?  Call   Best Call Back Number:  [phone]  Additional Information:  [facility]/PHARMACY #[id number] - [place], [place] - [address] [place] [address] AT [facility]

## 2022-12-07 ENCOUNTER — LAB VISIT (OUTPATIENT)
Dept: LAB | Facility: HOSPITAL | Age: 77
End: 2022-12-07
Attending: STUDENT IN AN ORGANIZED HEALTH CARE EDUCATION/TRAINING PROGRAM
Payer: MEDICARE

## 2022-12-07 ENCOUNTER — ANTI-COAG VISIT (OUTPATIENT)
Dept: CARDIOLOGY | Facility: CLINIC | Age: 77
End: 2022-12-07
Payer: MEDICARE

## 2022-12-07 DIAGNOSIS — Z79.01 LONG TERM (CURRENT) USE OF ANTICOAGULANTS: ICD-10-CM

## 2022-12-07 DIAGNOSIS — I48.91 ATRIAL FIBRILLATION WITH RVR: Primary | ICD-10-CM

## 2022-12-07 DIAGNOSIS — I48.91 ATRIAL FIBRILLATION WITH RVR: ICD-10-CM

## 2022-12-07 LAB
INR PPP: 2.1 (ref 0.8–1.2)
PROTHROMBIN TIME: 22 SEC (ref 9–12.5)

## 2022-12-07 PROCEDURE — 93793 ANTICOAG MGMT PT WARFARIN: CPT | Mod: S$GLB,,,

## 2022-12-07 PROCEDURE — 93793 PR ANTICOAGULANT MGMT FOR PT TAKING WARFARIN: ICD-10-PCS | Mod: S$GLB,,,

## 2022-12-07 PROCEDURE — 36415 COLL VENOUS BLD VENIPUNCTURE: CPT | Mod: PO | Performed by: STUDENT IN AN ORGANIZED HEALTH CARE EDUCATION/TRAINING PROGRAM

## 2022-12-07 PROCEDURE — 85610 PROTHROMBIN TIME: CPT | Mod: PO | Performed by: STUDENT IN AN ORGANIZED HEALTH CARE EDUCATION/TRAINING PROGRAM

## 2022-12-07 NOTE — TELEPHONE ENCOUNTER
----- Message from Alanna Guajardo sent at 12/7/2022 11:23 AM CST -----  Type:  Patient Returning Call    Who Called:pt  Who Left Message for Patient:pt  Does the patient know what this is regarding?:Tramadol does not have any refills and all the refills ended in Oct 2022  Would the patient rather a call back or a response via [facility]? Call  Best Call Back Number:[phone]  Additional Information: call in prescription to [facility] in [facility]

## 2022-12-07 NOTE — TELEPHONE ENCOUNTER
Dr Vargas is not able to refill this prescription until she see her in the office   As last time she filled the rx the pt no showed

## 2022-12-08 LAB
LEFT EYE DM RETINOPATHY: POSITIVE
RIGHT EYE DM RETINOPATHY: POSITIVE

## 2022-12-08 NOTE — PROGRESS NOTES
Patient called and was given lab result, verified correct coumadin dose, reports no changes, Patient was given coumadin instructions and next lab date, verbalized understanding but rescheduled lab appointment from 12/28 to 12/29/22

## 2022-12-09 ENCOUNTER — PATIENT OUTREACH (OUTPATIENT)
Dept: ADMINISTRATIVE | Facility: HOSPITAL | Age: 77
End: 2022-12-09
Payer: MEDICARE

## 2022-12-09 DIAGNOSIS — E11.9 DIABETES MELLITUS WITHOUT COMPLICATION: Primary | ICD-10-CM

## 2022-12-12 ENCOUNTER — TELEPHONE (OUTPATIENT)
Dept: FAMILY MEDICINE | Facility: CLINIC | Age: 77
End: 2022-12-12
Payer: MEDICARE

## 2022-12-12 NOTE — TELEPHONE ENCOUNTER
----- Message from Elisabeth Rubin sent at 12/12/2022  3:50 PM CST -----       Type: Patient Returning Call    Who Called: Patient   Who Left Message for Patient: NA    Does the patient know what this is regarding?: Patient says her appointment is on 12/14/2022 but her A1C test is not until 12/29/22. She wants to know if she should have the A1C test done before her appointment on 12/14/22 so results can be reviewed.     Would the patient rather a call back or a response via [facility]? Call  Best Call Back Number: [phone]  Additional Information:  Please assist, thank you!

## 2022-12-13 ENCOUNTER — PATIENT OUTREACH (OUTPATIENT)
Dept: ADMINISTRATIVE | Facility: HOSPITAL | Age: 77
End: 2022-12-13
Payer: MEDICARE

## 2022-12-13 ENCOUNTER — LAB VISIT (OUTPATIENT)
Dept: LAB | Facility: HOSPITAL | Age: 77
End: 2022-12-13
Attending: STUDENT IN AN ORGANIZED HEALTH CARE EDUCATION/TRAINING PROGRAM
Payer: MEDICARE

## 2022-12-13 DIAGNOSIS — E11.9 DIABETES MELLITUS WITHOUT COMPLICATION: ICD-10-CM

## 2022-12-13 LAB
ESTIMATED AVG GLUCOSE: 148 MG/DL (ref 68–131)
HBA1C MFR BLD: 6.8 % (ref 4–5.6)

## 2022-12-13 PROCEDURE — 83036 HEMOGLOBIN GLYCOSYLATED A1C: CPT | Performed by: STUDENT IN AN ORGANIZED HEALTH CARE EDUCATION/TRAINING PROGRAM

## 2022-12-13 PROCEDURE — 36415 COLL VENOUS BLD VENIPUNCTURE: CPT | Mod: PO | Performed by: STUDENT IN AN ORGANIZED HEALTH CARE EDUCATION/TRAINING PROGRAM

## 2022-12-19 ENCOUNTER — TELEPHONE (OUTPATIENT)
Dept: FAMILY MEDICINE | Facility: CLINIC | Age: 77
End: 2022-12-19
Payer: MEDICARE

## 2022-12-19 RX ORDER — TRAMADOL HYDROCHLORIDE 50 MG/1
50 TABLET ORAL EVERY 8 HOURS PRN
Qty: 90 TABLET | Refills: 5 | OUTPATIENT
Start: 2022-12-19

## 2022-12-19 RX ORDER — ALENDRONATE SODIUM 35 MG/1
35 TABLET ORAL WEEKLY
Qty: 12 TABLET | Refills: 1 | OUTPATIENT
Start: 2022-12-19

## 2022-12-19 NOTE — TELEPHONE ENCOUNTER
----- Message from Pritesh Mathis sent at 12/19/2022  9:45 AM CST -----  Contact: pt  Type: Requesting refill        Who Called: PT  Regarding: Tramadol  Would the patient rather a call back or a response via [facility]? Call back  Best Call Back Number: [phone]  Pharmacy Information: [facility]/pharmacy #[id number] - [address] AT [facility]   Phone: [phone]  Fax:  [phone]  Additional: states [facility] is faxing over (2) prescription request. (Did not verify name of medications)

## 2022-12-19 NOTE — TELEPHONE ENCOUNTER
No new care gaps identified.  [facility] Embedded Care Gaps. Reference number: [id number]. 12/19/2022   9:50:51 AM CST

## 2022-12-20 ENCOUNTER — TELEPHONE (OUTPATIENT)
Dept: FAMILY MEDICINE | Facility: CLINIC | Age: 77
End: 2022-12-20
Payer: MEDICARE

## 2022-12-20 NOTE — TELEPHONE ENCOUNTER
----- Message from Maria Teresa Thomas sent at 12/20/2022  9:43 AM CST -----  Name Of Caller:Yajaira            Provider Name:Ra Vargas            Does patient feel the need to be seen today?NO            Relationship to the Pt?:Pt            Contact Preference?:[phone]            What is the nature of the call?: Pt would like a call back because she has not received a prescription for her medication.

## 2022-12-20 NOTE — TELEPHONE ENCOUNTER
Message Header    Status: Read      Message  Received: Today   Refill, Same Day Access Requested  Radha Knapp Staff  Caller: Patient (Today,  1:46 PM)  Type:  RX Refill Request     Who Called: Patient   Refill or New Rx:Refill   RX Name and Strength:traMADoL (ULTRAM) 50 mg tablet   Preferred Pharmacy with phone number:[facility]/pharmacy #[id number] - [address] AT [facility]   Phone: [phone]   Fax: [phone]   Would the patient rather a call back or a response via [facility]? Call back   Best Call Back Number:[phone]   Additional Information: Please assist

## 2022-12-20 NOTE — TELEPHONE ENCOUNTER
"----- Message from Phani Field sent at 12/19/2022  4:59 PM CST -----  Contact: Pt  .Type:  Needs Medical Advice    Who Called: Pt  Would the patient rather a call back or a response via [facility]? Call  Best Call Back Number: [phone]  Additional Information:   Pt is saying she never received her prescription for tramadol.  Pt is also saying she has another medication that needs to be refilled  Pt has re-scheduled her appointment.  Pt said "It seems like the  won't give me my prescription."       "

## 2022-12-27 ENCOUNTER — TELEPHONE (OUTPATIENT)
Dept: CARDIOLOGY | Facility: CLINIC | Age: 77
End: 2022-12-27
Payer: MEDICARE

## 2022-12-27 DIAGNOSIS — M75.101 ROTATOR CUFF SYNDROME OF RIGHT SHOULDER: Primary | ICD-10-CM

## 2022-12-27 RX ORDER — AMIODARONE HYDROCHLORIDE 200 MG/1
200 TABLET ORAL DAILY
Qty: 90 TABLET | Refills: 1 | Status: SHIPPED | OUTPATIENT
Start: 2022-12-27 | End: 2023-01-06 | Stop reason: SDUPTHER

## 2022-12-27 RX ORDER — TRAMADOL HYDROCHLORIDE 50 MG/1
50 TABLET ORAL EVERY 8 HOURS PRN
Qty: 90 TABLET | Refills: 5 | OUTPATIENT
Start: 2022-12-27

## 2022-12-27 RX ORDER — ALENDRONATE SODIUM 35 MG/1
35 TABLET ORAL WEEKLY
Qty: 12 TABLET | Refills: 1 | OUTPATIENT
Start: 2022-12-27

## 2022-12-27 RX ORDER — PRAVASTATIN SODIUM 40 MG/1
40 TABLET ORAL NIGHTLY
Qty: 90 TABLET | Refills: 3 | Status: SHIPPED | OUTPATIENT
Start: 2022-12-27 | End: 2023-02-06 | Stop reason: SDUPTHER

## 2022-12-27 NOTE — TELEPHONE ENCOUNTER
----- Message from Primitivo Arciniega sent at 12/27/2022  1:39 PM CST -----  Type:  Needs Medical Advice    Who Called: self  Reason:returning call  Would the patient rather a call back or a response via [facility]? call  Best Call Back Number: [phone]  Additional Information: none

## 2022-12-27 NOTE — TELEPHONE ENCOUNTER
----- Message from Maria Teresa Thomas sent at 12/27/2022 11:37 AM CST -----  Name Of Caller:Yajaira            Provider Name: Anila Patrick            Does patient feel the need to be seen today?No            Relationship to the Pt?:Pt            Contact Preference?:[phone]            What is the nature of the call?: Pt would like a call back regarding refills for her medication.

## 2022-12-27 NOTE — PROGRESS NOTES
12/27 Pt called to reschedule lab. She said she has an appointment on 12/28 and she cannot make it. Advised her, her appt is for 12/29. Pt refuse to go to lab on 12/29 because she is getting shots in her eye. She said she's going 12/30 on 12/30 that's that date she's is going for 3:30p. Advised pt, I will have to get the approval from my pharmD and she may have to go to the hospital. Pt grew upset and said she's not going to the hospital for labs when she have to get shots in her eye on 12/29.

## 2022-12-27 NOTE — PROGRESS NOTES
Can pt get INR today 12/27 or 12/28 before her eye injections? INR is also ok on Fr 12/30 if she goes before 1pm. If she goes later than that, we will not get results to address before we close and we are also closed on Monday for holiday. Please give here these options but cannot go late on Friday. If she refuses these options, then move INR to the following Tuesday. Thanks!

## 2022-12-27 NOTE — TELEPHONE ENCOUNTER
----- Message from Jude Renee sent at 12/27/2022 11:33 AM CST -----  Contact: Pt  Type:  Patient Returning Call    Who Called:Pt   Would the patient rather a call back or a response via [facility]? Call   Best Call Back Number: [phone]  Additional Information:     Calling regarding refills   Pt did not want to give out any information

## 2022-12-27 NOTE — TELEPHONE ENCOUNTER
Called pt back in regards to this message.    Pt did not answer but left a detailed voice message as well as call back number    ND

## 2022-12-28 DIAGNOSIS — C64.9 MALIGNANT NEOPLASM OF KIDNEY: Primary | ICD-10-CM

## 2022-12-30 ENCOUNTER — ANTI-COAG VISIT (OUTPATIENT)
Dept: CARDIOLOGY | Facility: CLINIC | Age: 77
End: 2022-12-30
Payer: MEDICARE

## 2022-12-30 ENCOUNTER — LAB VISIT (OUTPATIENT)
Dept: LAB | Facility: HOSPITAL | Age: 77
End: 2022-12-30
Attending: STUDENT IN AN ORGANIZED HEALTH CARE EDUCATION/TRAINING PROGRAM
Payer: MEDICARE

## 2022-12-30 DIAGNOSIS — Z79.01 LONG TERM (CURRENT) USE OF ANTICOAGULANTS: ICD-10-CM

## 2022-12-30 DIAGNOSIS — I48.91 ATRIAL FIBRILLATION WITH RVR: Primary | ICD-10-CM

## 2022-12-30 DIAGNOSIS — I48.91 ATRIAL FIBRILLATION WITH RVR: ICD-10-CM

## 2022-12-30 LAB
INR PPP: 3.7 (ref 0.8–1.2)
PROTHROMBIN TIME: 37.7 SEC (ref 9–12.5)

## 2022-12-30 PROCEDURE — 93793 ANTICOAG MGMT PT WARFARIN: CPT | Mod: S$GLB,,, | Performed by: PHARMACIST

## 2022-12-30 PROCEDURE — 36415 COLL VENOUS BLD VENIPUNCTURE: CPT | Mod: HCNC,PO | Performed by: STUDENT IN AN ORGANIZED HEALTH CARE EDUCATION/TRAINING PROGRAM

## 2022-12-30 PROCEDURE — 85610 PROTHROMBIN TIME: CPT | Mod: HCNC,PO | Performed by: STUDENT IN AN ORGANIZED HEALTH CARE EDUCATION/TRAINING PROGRAM

## 2022-12-30 PROCEDURE — 93793 PR ANTICOAGULANT MGMT FOR PT TAKING WARFARIN: ICD-10-PCS | Mod: S$GLB,,, | Performed by: PHARMACIST

## 2023-01-03 ENCOUNTER — TELEPHONE (OUTPATIENT)
Dept: CARDIOLOGY | Facility: CLINIC | Age: 78
End: 2023-01-03
Payer: MEDICAID

## 2023-01-03 NOTE — TELEPHONE ENCOUNTER
----- Message from Phani Field sent at 1/3/2023  9:34 AM CST -----  .Type:  Needs Medical Advice    Who Called: Pt  Would the patient rather a call back or a response via [facility]? Call  Best Call Back Number: [phone]  Additional Information:   Pt is asking for Dr. Patrick to be pt's PCP.  Pt is requesting a call back.

## 2023-01-03 NOTE — PROGRESS NOTES
On 1/3/23 patient states that she checked her messages 2 days ago and she did not hold her dose of coumadin on 12/30 as advised on voicemail message.  Patient took her dose of coumadin per calendar. No changes were reported. INr was elevated 4 days ago and patient did not lower dose. We will hold coumadin today and repeat INR this week.

## 2023-01-04 ENCOUNTER — ANTI-COAG VISIT (OUTPATIENT)
Dept: CARDIOLOGY | Facility: CLINIC | Age: 78
End: 2023-01-04
Payer: MEDICARE

## 2023-01-04 ENCOUNTER — LAB VISIT (OUTPATIENT)
Dept: LAB | Facility: HOSPITAL | Age: 78
End: 2023-01-04
Attending: STUDENT IN AN ORGANIZED HEALTH CARE EDUCATION/TRAINING PROGRAM
Payer: MEDICAID

## 2023-01-04 DIAGNOSIS — Z79.01 LONG TERM (CURRENT) USE OF ANTICOAGULANTS: ICD-10-CM

## 2023-01-04 DIAGNOSIS — I48.91 ATRIAL FIBRILLATION WITH RVR: ICD-10-CM

## 2023-01-04 DIAGNOSIS — I48.91 ATRIAL FIBRILLATION WITH RVR: Primary | ICD-10-CM

## 2023-01-04 LAB
INR PPP: 2.7 (ref 0.8–1.2)
PROTHROMBIN TIME: 27.7 SEC (ref 9–12.5)

## 2023-01-04 PROCEDURE — 36415 COLL VENOUS BLD VENIPUNCTURE: CPT | Mod: HCNC,PO | Performed by: STUDENT IN AN ORGANIZED HEALTH CARE EDUCATION/TRAINING PROGRAM

## 2023-01-04 PROCEDURE — 93793 ANTICOAG MGMT PT WARFARIN: CPT | Mod: S$GLB,,,

## 2023-01-04 PROCEDURE — 85610 PROTHROMBIN TIME: CPT | Mod: HCNC,PO | Performed by: STUDENT IN AN ORGANIZED HEALTH CARE EDUCATION/TRAINING PROGRAM

## 2023-01-04 PROCEDURE — 93793 PR ANTICOAGULANT MGMT FOR PT TAKING WARFARIN: ICD-10-PCS | Mod: S$GLB,,,

## 2023-01-06 DIAGNOSIS — I48.91 ATRIAL FIBRILLATION WITH RVR: Primary | ICD-10-CM

## 2023-01-09 ENCOUNTER — TELEPHONE (OUTPATIENT)
Dept: FAMILY MEDICINE | Facility: CLINIC | Age: 78
End: 2023-01-09

## 2023-01-09 RX ORDER — METOPROLOL SUCCINATE 50 MG/1
50 TABLET, EXTENDED RELEASE ORAL DAILY
Qty: 90 TABLET | Refills: 3 | Status: SHIPPED | OUTPATIENT
Start: 2023-01-09 | End: 2023-02-06

## 2023-01-09 RX ORDER — AMIODARONE HYDROCHLORIDE 200 MG/1
200 TABLET ORAL DAILY
Qty: 90 TABLET | Refills: 3 | Status: SHIPPED | OUTPATIENT
Start: 2023-01-09

## 2023-01-09 NOTE — TELEPHONE ENCOUNTER
Patient was scheduled to see Dr Louie today at 4 pm virtually  I LM with details and canceled appt as the patient needs to see dr vargas in person  As her last appointment  with Dr Vargas was 11/2021

## 2023-01-11 ENCOUNTER — TELEPHONE (OUTPATIENT)
Dept: BARIATRICS | Facility: CLINIC | Age: 78
End: 2023-01-11

## 2023-01-11 ENCOUNTER — OFFICE VISIT (OUTPATIENT)
Dept: BARIATRICS | Facility: CLINIC | Age: 78
End: 2023-01-11
Payer: MEDICARE

## 2023-01-11 VITALS
SYSTOLIC BLOOD PRESSURE: 126 MMHG | BODY MASS INDEX: 40.8 KG/M2 | WEIGHT: 207.81 LBS | OXYGEN SATURATION: 98 % | DIASTOLIC BLOOD PRESSURE: 56 MMHG | HEIGHT: 60 IN

## 2023-01-11 DIAGNOSIS — Z79.4 TYPE 2 DIABETES MELLITUS WITH STAGE 4 CHRONIC KIDNEY DISEASE, WITH LONG-TERM CURRENT USE OF INSULIN: ICD-10-CM

## 2023-01-11 DIAGNOSIS — E66.01 CLASS 3 SEVERE OBESITY DUE TO EXCESS CALORIES WITH SERIOUS COMORBIDITY AND BODY MASS INDEX (BMI) OF 40.0 TO 44.9 IN ADULT: Primary | ICD-10-CM

## 2023-01-11 DIAGNOSIS — E11.22 TYPE 2 DIABETES MELLITUS WITH STAGE 4 CHRONIC KIDNEY DISEASE, WITH LONG-TERM CURRENT USE OF INSULIN: ICD-10-CM

## 2023-01-11 DIAGNOSIS — N18.4 TYPE 2 DIABETES MELLITUS WITH STAGE 4 CHRONIC KIDNEY DISEASE, WITH LONG-TERM CURRENT USE OF INSULIN: ICD-10-CM

## 2023-01-11 DIAGNOSIS — Z71.3 ENCOUNTER FOR WEIGHT LOSS COUNSELING: ICD-10-CM

## 2023-01-11 PROBLEM — E66.813 CLASS 3 SEVERE OBESITY DUE TO EXCESS CALORIES WITH SERIOUS COMORBIDITY AND BODY MASS INDEX (BMI) OF 40.0 TO 44.9 IN ADULT: Status: ACTIVE | Noted: 2021-06-16

## 2023-01-11 PROCEDURE — 1159F MED LIST DOCD IN RCRD: CPT | Mod: HCNC,CPTII,S$GLB, | Performed by: STUDENT IN AN ORGANIZED HEALTH CARE EDUCATION/TRAINING PROGRAM

## 2023-01-11 PROCEDURE — 3074F PR MOST RECENT SYSTOLIC BLOOD PRESSURE < 130 MM HG: ICD-10-PCS | Mod: HCNC,CPTII,S$GLB, | Performed by: STUDENT IN AN ORGANIZED HEALTH CARE EDUCATION/TRAINING PROGRAM

## 2023-01-11 PROCEDURE — 3288F PR FALLS RISK ASSESSMENT DOCUMENTED: ICD-10-PCS | Mod: HCNC,CPTII,S$GLB, | Performed by: STUDENT IN AN ORGANIZED HEALTH CARE EDUCATION/TRAINING PROGRAM

## 2023-01-11 PROCEDURE — 3078F PR MOST RECENT DIASTOLIC BLOOD PRESSURE < 80 MM HG: ICD-10-PCS | Mod: HCNC,CPTII,S$GLB, | Performed by: STUDENT IN AN ORGANIZED HEALTH CARE EDUCATION/TRAINING PROGRAM

## 2023-01-11 PROCEDURE — 1160F PR REVIEW ALL MEDS BY PRESCRIBER/CLIN PHARMACIST DOCUMENTED: ICD-10-PCS | Mod: HCNC,CPTII,S$GLB, | Performed by: STUDENT IN AN ORGANIZED HEALTH CARE EDUCATION/TRAINING PROGRAM

## 2023-01-11 PROCEDURE — 3074F SYST BP LT 130 MM HG: CPT | Mod: HCNC,CPTII,S$GLB, | Performed by: STUDENT IN AN ORGANIZED HEALTH CARE EDUCATION/TRAINING PROGRAM

## 2023-01-11 PROCEDURE — 99999 PR PBB SHADOW E&M-EST. PATIENT-LVL V: ICD-10-PCS | Mod: PBBFAC,HCNC,, | Performed by: STUDENT IN AN ORGANIZED HEALTH CARE EDUCATION/TRAINING PROGRAM

## 2023-01-11 PROCEDURE — 99204 PR OFFICE/OUTPT VISIT, NEW, LEVL IV, 45-59 MIN: ICD-10-PCS | Mod: HCNC,S$GLB,, | Performed by: STUDENT IN AN ORGANIZED HEALTH CARE EDUCATION/TRAINING PROGRAM

## 2023-01-11 PROCEDURE — 1159F PR MEDICATION LIST DOCUMENTED IN MEDICAL RECORD: ICD-10-PCS | Mod: HCNC,CPTII,S$GLB, | Performed by: STUDENT IN AN ORGANIZED HEALTH CARE EDUCATION/TRAINING PROGRAM

## 2023-01-11 PROCEDURE — 3288F FALL RISK ASSESSMENT DOCD: CPT | Mod: HCNC,CPTII,S$GLB, | Performed by: STUDENT IN AN ORGANIZED HEALTH CARE EDUCATION/TRAINING PROGRAM

## 2023-01-11 PROCEDURE — 1160F RVW MEDS BY RX/DR IN RCRD: CPT | Mod: HCNC,CPTII,S$GLB, | Performed by: STUDENT IN AN ORGANIZED HEALTH CARE EDUCATION/TRAINING PROGRAM

## 2023-01-11 PROCEDURE — 99204 OFFICE O/P NEW MOD 45 MIN: CPT | Mod: HCNC,S$GLB,, | Performed by: STUDENT IN AN ORGANIZED HEALTH CARE EDUCATION/TRAINING PROGRAM

## 2023-01-11 PROCEDURE — 99999 PR PBB SHADOW E&M-EST. PATIENT-LVL V: CPT | Mod: PBBFAC,HCNC,, | Performed by: STUDENT IN AN ORGANIZED HEALTH CARE EDUCATION/TRAINING PROGRAM

## 2023-01-11 PROCEDURE — 1126F PR PAIN SEVERITY QUANTIFIED, NO PAIN PRESENT: ICD-10-PCS | Mod: HCNC,CPTII,S$GLB, | Performed by: STUDENT IN AN ORGANIZED HEALTH CARE EDUCATION/TRAINING PROGRAM

## 2023-01-11 PROCEDURE — 1126F AMNT PAIN NOTED NONE PRSNT: CPT | Mod: HCNC,CPTII,S$GLB, | Performed by: STUDENT IN AN ORGANIZED HEALTH CARE EDUCATION/TRAINING PROGRAM

## 2023-01-11 PROCEDURE — 3078F DIAST BP <80 MM HG: CPT | Mod: HCNC,CPTII,S$GLB, | Performed by: STUDENT IN AN ORGANIZED HEALTH CARE EDUCATION/TRAINING PROGRAM

## 2023-01-11 PROCEDURE — 1100F PTFALLS ASSESS-DOCD GE2>/YR: CPT | Mod: HCNC,CPTII,S$GLB, | Performed by: STUDENT IN AN ORGANIZED HEALTH CARE EDUCATION/TRAINING PROGRAM

## 2023-01-11 PROCEDURE — 1100F PR PT FALLS ASSESS DOC 2+ FALLS/FALL W/INJURY/YR: ICD-10-PCS | Mod: HCNC,CPTII,S$GLB, | Performed by: STUDENT IN AN ORGANIZED HEALTH CARE EDUCATION/TRAINING PROGRAM

## 2023-01-11 NOTE — TELEPHONE ENCOUNTER
Returned pts call in regard to missed call. Left vm for pt to call back to gain more clarity on letter needed for transportation.

## 2023-01-11 NOTE — TELEPHONE ENCOUNTER
----- Message from Gilberto Francisco sent at 1/11/2023  3:17 PM CST -----  Contact: [phone]  Pt is calling in ref to her transportation for earlier appt -- states she needs a letter with a [facility] letter head, pt name , appt info, and doctors signature faxed over to [facility] [phone] in order to refunded -- please give pt a call back for anything further [phone]

## 2023-01-11 NOTE — TELEPHONE ENCOUNTER
----- Message from Zenaida Alexandre sent at 1/11/2023  4:03 PM CST -----  Contact: pt  Pt returning callback             Confirmed contact below:  Contact Name:Yajaira Terry  Phone Number: [phone]

## 2023-01-11 NOTE — PATIENT INSTRUCTIONS
Copyright © 2011, [facility]. For more information about The Healthy Eating Plate, please see The Nutrition Source, Department of Nutrition, [employer], www.thenutritionsource.org, and [employer], www.health.[employer].edu.      Meal Planning & Grocery Shopping    Meal planning builds the foundation for healthy eating. When you have structured ideas for healthy meals and foods available at home to prepare those meals, weight control becomes easier.  If only healthy foods are available at home, then you will be much more likely to eat healthy foods. And you will be less likely to go to a restaurant or  a fast food meal, which tend to be unhealthy and higher in calories than meals prepared at home.      Take 5-10 minutes each week to plan meals for the next 7 days.  Make a grocery list based on the meal plan.    Grocery Shopping Tips:  Shop on a full stomach.  Schedule your shopping for times when you are most motivated and able to be disciplined about your purchases. For example, after a stressful day at work it may be difficult to make the healthiest choices. Shopping at other times, such as early in the morning or after dinner, may be easier.  Focus your shopping on the outside aisles of the store, which tend to contain more fresh foods and lower calorie foods. The inside aisles tend to have more processed foods.  Stick to your list. Avoid buying unhealthy items just because they are on sale.   Compare nutrition labels to check the number of calories and percentage of fat.      What to buy:    Vegetables  Fresh vegetables  Frozen vegetables with no sauce or added salt  Canned vegetables with no sauce or added salt    Protein  Lean meats, such as chicken and turkey  Limit red meats, such as beef to no more than 1x/week  Limit processed meats, such as cold cuts, goodman, sausage, and hot dogs. Look for brands that have no nitrites and are minimally  processed. Consider turkey sausage or turkey goodman.  Fish and Shellfish  Eggs  Dried beans  Canned beans (reduced sodium)    Fat  Use healthy oils, such as olive oil or canola oil, for cooking, salad dressings, etc.  Unflavored nuts and seeds  Nut butters (no added sugar)    Dairy  Yogurt (no sugar added)  Cheese  Low-fat milk  Unsweetened nondairy milks (almond milk, soy milk, etc)    Fruit  Fresh Fruit  Frozen fruit with no added sugar  Canned fruit with no added sugar  Dried fruit with no added sugar  100% fruit juice    Whole Grains  Single ingredient grains, such as oats, quinoa, brown rice  Whole-wheat pasta  Sprouted whole-grain bread    What to avoid:  - Avoid fried foods  - Avoid foods with added sugar  - Avoid sugar-sweetened beverages  - Avoid ultra-processed foods      SEATED RESISTANCE BAND EXERCISES    If you do not have a resistance band, or do not feel comfortable using a resistance band, these exercises can also be done holding a light hand weight or water bottle.  If you are just starting to exercise, you may want to go through the motions without any weights or resistance till you become comfortable with the movements.    Do each of the movements shown 10 times (10 repetitions). You can repeat the exercises a second or  third time as well for greater benefit. The amount of tension on the resistance bands should be adjusted so  you can complete one set of 10 repetitions with effort. Increase the tension every few weeks. Do these  exercises 2 or 3 times a week.    * If an exercise hurts your back or joints, stop doing that particular exercise, but keep doing all the others *      CHEST EXERCISE        Start Position:   Sit tall, with feet shoulder width apart and feet in front of knees.   Belly pulled in.   Place the band around your upper back, grasp band in each hand, knuckles (rings) facing front. Arms  should be bent so that knuckles are in front of elbows   Slide shoulder blades down your  back and slightly together (as if making a V).   Relax your neck.    To Perform This Exercise:   Press hands forward to lengthen arms using chest muscles (not arms!).   Dont arch your back!)   Return to start position and repeat 10 times.   Breathe!        BACK EXERCISE        Start Position:   Sit tall, with feet shoulder width apart and feet in front of knees.   Belly pulled in.   Grasp band in each hand and raise overhead. Arms should be slightly wider than shoulder width and no  slack in the band.   Slide shoulder blades down your back and slightly together (as if making a V).   Relax your neck.    To Perform This Exercise:   Open arms pulling down towards chest using upper back muscles (not arms!).   Squeeze through shoulder blades at the bottom of the movement (dont arch your back!).   Return to start position and repeat 10 times.   Breathe!        SHOULDER EXERCISE        Start Position:   Sit tall, with feet shoulder width apart and feet in front of knees.   Belly pulled in.   Sit on band so that you can grasp band in one hand with tension on the band, but not so much tension that  you cannot straighten the arm. It may take a few tries to find the right amount of tension.   Slide shoulder blades down your back and slightly together (as if making a V).   Relax your neck.    To Perform This Exercise:   Press fist to the ceiling, slightly in front of the body.   SLOWLY return to start position and repeat 10 times.   Switch sides and repeat on the other side.   Breathe!        TRICEPS EXERCISE        Start Position:   Sit tall, with feet shoulder width apart and feet in front of knees.   Belly pulled in.   Sit on one end of the band. Grasp other end of band in one hand.   Point elbow directly toward the ceiling (if this is difficult, you may support the upper arm with the opposite  hand)   Be sure there is no slack in the band in the starting position.   Slide shoulder blades down your back and slightly  together (as if making a V).   Relax your neck.    To Perform This Exercise:   Extend your arm up to the ceiling, as shown.   Squeeze through shoulder blades at the bottom of the movement (dont arch your back!).   SLOWLY return to start position and repeat 10 times.   Repeat on the other side.   Breathe!        BICEP EXERCISE        Start Position:   Sit tall, with feet shoulder width apart and feet in front of knees.   Belly pulled in.   Place center of exercise band under one foot and step the end of the band under the other foot.   Grasp each end of the band with one hand. Make sure there is no slack between your foot and the hand  that holds the band.   Hold your elbows to your sides.   Pull abdominals in, lift chest, press shoulders down and back.    To Perform This Exercise:   As you curl up, keep your wrist from changing position in relation to your forearm and your arm stable  from the shoulder to the elbow.   Bend and straighten your elbow in a slow and controlled movement. Repeat this motion 10 times.   Repeat on the other side.   Breathe!

## 2023-01-11 NOTE — PROGRESS NOTES
Subjective:       Patient ID: Yajaira Terry is a 77 y.o. female.    Chief Complaint: Consult and Obesity    Patient presents for treatment of obesity.     Co-morbidities  CHFeEF  DM2 on Victoza, insulin  CKD IV  H/o RCC  OA, bilateral knees  Nephrolithiasis      Current Physical Activity  House work - cooking, dishes, laundry  Fractured back 2 years ago, has been using walker since then      Current Eating Habits  Breakfast - eggs, waffle with SF syrup, goodman; egg, oatmeal with sugar, goodman; eggs, grits, goodman; raisin bread  Lunch (biggest meal) - red beans and rice with sausage, corn muffin; spaghetti and meatball with garlic bread; sweet and sour chicken with fried rice; chicken davi with rice; tacos; meat loaf with mashed potatoes, peas  Sunday Dinner - sirloin steak with gravy, green bean casserole, potato salad  Snacks - dessert every night - pie, ice cream, cookies, ooey gooey cake  Beverages - iced sweet tea, juice, yoohoo    Medical Weight Loss  1/11/2023: 207.8 lbs, BMI 40.6, BFP 54.8%, .9 lbs, SMM 49.6 lbs, BMR 1289 lbs    Review of Systems   Constitutional:  Negative for chills and fever.   Respiratory:  Negative for shortness of breath.    Cardiovascular:  Negative for chest pain and palpitations.   Gastrointestinal:  Negative for abdominal pain, nausea and vomiting.   Musculoskeletal:  Positive for arthralgias and back pain.   Neurological:  Negative for dizziness and light-headedness.   Psychiatric/Behavioral:  The patient is not nervous/anxious.        Objective:       Latest Reference Range & Units 03/11/22 10:38 04/01/22 11:34 08/03/22 15:23 12/13/22 14:25   Sodium 136 - 145 mmol/L 141 141 144    Potassium 3.5 - 5.1 mmol/L 4.2 4.6 4.2    Chloride 95 - 110 mmol/L 103 101 106    CO2 23 - 29 mmol/L 31 (H) 31 (H) 30 (H)    Anion Gap 8 - 16 mmol/L 7 (L) 9 8    BUN 7 - 17 mg/dL 25 (H) 32 (H) 21 (H)    Creatinine 0.50 - 1.40 mg/dL 1.47 (H) 1.58 (H) 1.23    eGFR >60 mL/min/1.73 m^2   45.0 !     eGFR if non African American >60 mL/min/1.73 m^2 34.2 ! 31.3 !     eGFR if African American >60 mL/min/1.73 m^2 39.4 ! 36.1 !     Glucose 70 - 110 mg/dL 125 (H) 166 (H) 132 (H)    Calcium 8.7 - 10.5 mg/dL 9.1 9.4 9.0    Alkaline Phosphatase 38 - 126 U/L 146 (H)  173 (H)    PROTEIN TOTAL 6.0 - 8.4 g/dL 7.6  8.0    Albumin 3.5 - 5.2 g/dL 3.9  4.4    BILIRUBIN TOTAL 0.1 - 1.0 mg/dL 0.6  0.8    AST 15 - 46 U/L 35  42    ALT 10 - 44 U/L 25  23     - 260 U/L   317 (H)    Hemoglobin A1C External 4.0 - 5.6 % 7.2 (H)   6.8 (H)   Estimated Avg Glucose 68 - 131 mg/dL 160 (H)   148 (H)   (H): Data is abnormally high  (L): Data is abnormally low  !: Data is abnormal    Vitals:    01/11/23 1353   BP: (!) 126/56       Physical Exam  Vitals reviewed.   Constitutional:       General: She is not in acute distress.     Appearance: Normal appearance. She is obese. She is not ill-appearing, toxic-appearing or diaphoretic.   HENT:      Head: Normocephalic and atraumatic.   Cardiovascular:      Rate and Rhythm: Normal rate.   Pulmonary:      Effort: Pulmonary effort is normal. No respiratory distress.   Skin:     General: Skin is warm and dry.   Neurological:      Mental Status: She is alert and oriented to person, place, and time.      Gait: Gait abnormal.       Assessment:       Problem List Items Addressed This Visit       Type 2 diabetes mellitus, with long-term current use of insulin    Class 3 severe obesity due to excess calories with serious comorbidity and body mass index (BMI) of 40.0 to 44.9 in adult - Primary     Other Visit Diagnoses       Encounter for weight loss counseling                  Plan:   - Recommendations sent to PCP - switch from Victoza to Ozempic or Mounjaro for better weight loss effect    - Log all food and beverage intake with a daily calorie goal of 8945-1382 calories per day, 100-120 grams of protein    - Seated resistance band exercises given in AVS

## 2023-01-12 DIAGNOSIS — Z79.01 LONG TERM (CURRENT) USE OF ANTICOAGULANTS: Primary | ICD-10-CM

## 2023-01-12 RX ORDER — WARFARIN 3 MG/1
3 TABLET ORAL DAILY
Qty: 30 TABLET | Refills: 11 | Status: SHIPPED | OUTPATIENT
Start: 2023-01-12 | End: 2023-12-07

## 2023-01-12 NOTE — TELEPHONE ENCOUNTER
----- Message from Keily Willoughby sent at 1/12/2023  8:20 AM CST -----  Type:  RX Refill Request    Who Called:  pt  Refill or New Rx: refill   RX Name and Strength: warfarin (COUMADIN) 3 MG tablet  How is the patient currently taking it? (ex. 1XDay):TAKE ONE TABLET BY MOUTH DAILY ALTERNATING WITH A HALF OF TABLET  Is this a 30 day or 90 day RX:30  Preferred Pharmacy with phone number:[facility]/pharmacy #[id number] - [address] AT [facility]   Phone: [phone]  Fax:  [phone]    Local or Mail Order: local   Ordering Provider:PCP   Would the patient rather a call back or a response via [facility]?  Call   Best Call Back Number:[phone]  Additional Information:  pt cant see a PCP until next week and she is running out she only has 5 days worth left

## 2023-01-17 ENCOUNTER — TELEPHONE (OUTPATIENT)
Dept: BARIATRICS | Facility: CLINIC | Age: 78
End: 2023-01-17
Payer: MEDICARE

## 2023-01-17 NOTE — TELEPHONE ENCOUNTER
----- Message from Kimmy Dai sent at 1/17/2023  1:47 PM CST -----  Contact: [phone]  Yajaira Terry calling stated her new PCP is Dr. Leon.  She is no longer seeing Dr. Ra Vargas.   Dr. Leon telephone is [phone].  call back [phone]

## 2023-01-17 NOTE — PROGRESS NOTES
1/17/23  INR is due tomorrow 1/18, Patient called requesting a prescription for warfarin -3mg tablets be called in to [facility] ph # [phone], states has enough till Friday only

## 2023-01-18 ENCOUNTER — LAB VISIT (OUTPATIENT)
Dept: LAB | Facility: HOSPITAL | Age: 78
End: 2023-01-18
Attending: STUDENT IN AN ORGANIZED HEALTH CARE EDUCATION/TRAINING PROGRAM
Payer: MEDICARE

## 2023-01-18 DIAGNOSIS — I48.91 ATRIAL FIBRILLATION WITH RVR: ICD-10-CM

## 2023-01-18 DIAGNOSIS — Z79.01 LONG TERM (CURRENT) USE OF ANTICOAGULANTS: ICD-10-CM

## 2023-01-18 LAB
INR PPP: 1.8 (ref 0.8–1.2)
PROTHROMBIN TIME: 19.2 SEC (ref 9–12.5)

## 2023-01-18 PROCEDURE — 36415 COLL VENOUS BLD VENIPUNCTURE: CPT | Mod: HCNC,PO | Performed by: STUDENT IN AN ORGANIZED HEALTH CARE EDUCATION/TRAINING PROGRAM

## 2023-01-18 PROCEDURE — 85610 PROTHROMBIN TIME: CPT | Mod: HCNC,PO | Performed by: STUDENT IN AN ORGANIZED HEALTH CARE EDUCATION/TRAINING PROGRAM

## 2023-01-19 ENCOUNTER — ANTI-COAG VISIT (OUTPATIENT)
Dept: CARDIOLOGY | Facility: CLINIC | Age: 78
End: 2023-01-19
Payer: MEDICARE

## 2023-01-19 DIAGNOSIS — Z79.01 LONG TERM (CURRENT) USE OF ANTICOAGULANTS: ICD-10-CM

## 2023-01-19 DIAGNOSIS — I48.91 ATRIAL FIBRILLATION WITH RVR: Primary | ICD-10-CM

## 2023-01-19 PROCEDURE — 93793 PR ANTICOAGULANT MGMT FOR PT TAKING WARFARIN: ICD-10-PCS | Mod: S$GLB,,,

## 2023-01-19 PROCEDURE — 93793 ANTICOAG MGMT PT WARFARIN: CPT | Mod: S$GLB,,,

## 2023-01-19 NOTE — PROGRESS NOTES
INR low. Pt reports she may have missed a dose. Bolus dose today then resume current dose. Recheck INR in 2 weeks

## 2023-01-23 ENCOUNTER — LAB VISIT (OUTPATIENT)
Dept: LAB | Facility: HOSPITAL | Age: 78
End: 2023-01-23
Attending: STUDENT IN AN ORGANIZED HEALTH CARE EDUCATION/TRAINING PROGRAM
Payer: MEDICARE

## 2023-01-23 DIAGNOSIS — Z79.01 LONG TERM (CURRENT) USE OF ANTICOAGULANTS: ICD-10-CM

## 2023-01-23 DIAGNOSIS — I48.91 ATRIAL FIBRILLATION WITH RVR: ICD-10-CM

## 2023-01-23 LAB
INR PPP: 2.9 (ref 0.8–1.2)
PROTHROMBIN TIME: 30 SEC (ref 9–12.5)

## 2023-01-23 PROCEDURE — 36415 COLL VENOUS BLD VENIPUNCTURE: CPT | Mod: HCNC,PO | Performed by: STUDENT IN AN ORGANIZED HEALTH CARE EDUCATION/TRAINING PROGRAM

## 2023-01-23 PROCEDURE — 85610 PROTHROMBIN TIME: CPT | Mod: HCNC,PO | Performed by: STUDENT IN AN ORGANIZED HEALTH CARE EDUCATION/TRAINING PROGRAM

## 2023-01-24 ENCOUNTER — ANTI-COAG VISIT (OUTPATIENT)
Dept: CARDIOLOGY | Facility: CLINIC | Age: 78
End: 2023-01-24
Payer: MEDICARE

## 2023-01-24 DIAGNOSIS — I48.91 ATRIAL FIBRILLATION WITH RVR: Primary | ICD-10-CM

## 2023-01-24 DIAGNOSIS — Z79.01 LONG TERM (CURRENT) USE OF ANTICOAGULANTS: ICD-10-CM

## 2023-01-24 PROCEDURE — 93793 PR ANTICOAGULANT MGMT FOR PT TAKING WARFARIN: ICD-10-PCS | Mod: S$GLB,,,

## 2023-01-24 PROCEDURE — 93793 ANTICOAG MGMT PT WARFARIN: CPT | Mod: S$GLB,,,

## 2023-01-24 NOTE — PROGRESS NOTES
INR good. Pt went to lab unscheduled today and so was questioned for changes. Pt reports taking higher dose than prescribed. No other changes. She went to lab on error. Adjust dose for taking higher than advised dose and based on trend. Recheck INR in 2 weeks. R/s INR that was planned 1/30 to 2 weeks.

## 2023-01-25 ENCOUNTER — HOSPITAL ENCOUNTER (OUTPATIENT)
Dept: RADIOLOGY | Facility: HOSPITAL | Age: 78
Discharge: HOME | End: 2023-01-25
Attending: UROLOGY
Payer: MEDICARE

## 2023-01-25 DIAGNOSIS — C64.9 MALIGNANT NEOPLASM OF KIDNEY: ICD-10-CM

## 2023-01-25 PROCEDURE — 71250 CT THORAX DX C-: CPT | Mod: 26,HCNC,, | Performed by: RADIOLOGY

## 2023-01-25 PROCEDURE — 74176 CT ABD & PELVIS W/O CONTRAST: CPT | Mod: 26,HCNC,, | Performed by: RADIOLOGY

## 2023-01-25 PROCEDURE — 74176 CT CHEST ABDOMEN PELVIS WITHOUT CONTRAST(XPD): ICD-10-PCS | Mod: 26,HCNC,, | Performed by: RADIOLOGY

## 2023-01-25 PROCEDURE — 71250 CT CHEST ABDOMEN PELVIS WITHOUT CONTRAST(XPD): ICD-10-PCS | Mod: 26,HCNC,, | Performed by: RADIOLOGY

## 2023-01-25 PROCEDURE — 71250 CT THORAX DX C-: CPT | Mod: TC,HCNC,PO

## 2023-02-03 ENCOUNTER — PATIENT OUTREACH (OUTPATIENT)
Dept: ADMINISTRATIVE | Facility: OTHER | Age: 78
End: 2023-02-03
Payer: MEDICARE

## 2023-02-03 NOTE — PROGRESS NOTES
CHW - Initial Contact    This Community Health Worker completed the Social Determinant of Health questionnaire with MRN [id number] over the phone today.    Pt identified barriers of most importance are: No barriers reported   Referrals to community agencies completed with patient/caregiver consent outside of [facility] include: No  Referrals were put through [facility] - No  Support and Services: No support & services have been documented.  Other information discussed the patient needs / wants help with: No assistance requested at this time   Follow up required: No  No future outreach task assigned

## 2023-02-06 ENCOUNTER — OFFICE VISIT (OUTPATIENT)
Dept: FAMILY MEDICINE | Facility: CLINIC | Age: 78
End: 2023-02-06
Payer: MEDICARE

## 2023-02-06 ENCOUNTER — LAB VISIT (OUTPATIENT)
Dept: LAB | Facility: HOSPITAL | Age: 78
End: 2023-02-06
Attending: STUDENT IN AN ORGANIZED HEALTH CARE EDUCATION/TRAINING PROGRAM
Payer: MEDICARE

## 2023-02-06 VITALS
HEIGHT: 60 IN | SYSTOLIC BLOOD PRESSURE: 122 MMHG | DIASTOLIC BLOOD PRESSURE: 62 MMHG | BODY MASS INDEX: 40.25 KG/M2 | WEIGHT: 205 LBS | HEART RATE: 46 BPM | OXYGEN SATURATION: 95 %

## 2023-02-06 DIAGNOSIS — I48.91 ATRIAL FIBRILLATION WITH RVR: ICD-10-CM

## 2023-02-06 DIAGNOSIS — Z79.4 TYPE 2 DIABETES MELLITUS WITH STAGE 4 CHRONIC KIDNEY DISEASE, WITH LONG-TERM CURRENT USE OF INSULIN: Primary | ICD-10-CM

## 2023-02-06 DIAGNOSIS — E11.22 TYPE 2 DIABETES MELLITUS WITH STAGE 4 CHRONIC KIDNEY DISEASE, WITH LONG-TERM CURRENT USE OF INSULIN: Primary | ICD-10-CM

## 2023-02-06 DIAGNOSIS — N18.4 TYPE 2 DIABETES MELLITUS WITH STAGE 4 CHRONIC KIDNEY DISEASE, WITH LONG-TERM CURRENT USE OF INSULIN: Primary | ICD-10-CM

## 2023-02-06 DIAGNOSIS — Z79.01 LONG TERM (CURRENT) USE OF ANTICOAGULANTS: ICD-10-CM

## 2023-02-06 LAB
INR PPP: 2.1 (ref 0.8–1.2)
PROTHROMBIN TIME: 22.3 SEC (ref 9–12.5)

## 2023-02-06 PROCEDURE — 3078F DIAST BP <80 MM HG: CPT | Mod: CPTII,S$GLB,, | Performed by: FAMILY MEDICINE

## 2023-02-06 PROCEDURE — 1126F AMNT PAIN NOTED NONE PRSNT: CPT | Mod: CPTII,S$GLB,, | Performed by: FAMILY MEDICINE

## 2023-02-06 PROCEDURE — 36415 COLL VENOUS BLD VENIPUNCTURE: CPT | Mod: HCNC,PO | Performed by: STUDENT IN AN ORGANIZED HEALTH CARE EDUCATION/TRAINING PROGRAM

## 2023-02-06 PROCEDURE — 1126F PR PAIN SEVERITY QUANTIFIED, NO PAIN PRESENT: ICD-10-PCS | Mod: CPTII,S$GLB,, | Performed by: FAMILY MEDICINE

## 2023-02-06 PROCEDURE — 1101F PR PT FALLS ASSESS DOC 0-1 FALLS W/OUT INJ PAST YR: ICD-10-PCS | Mod: CPTII,S$GLB,, | Performed by: FAMILY MEDICINE

## 2023-02-06 PROCEDURE — 3288F PR FALLS RISK ASSESSMENT DOCUMENTED: ICD-10-PCS | Mod: CPTII,S$GLB,, | Performed by: FAMILY MEDICINE

## 2023-02-06 PROCEDURE — 3074F PR MOST RECENT SYSTOLIC BLOOD PRESSURE < 130 MM HG: ICD-10-PCS | Mod: CPTII,S$GLB,, | Performed by: FAMILY MEDICINE

## 2023-02-06 PROCEDURE — 3078F PR MOST RECENT DIASTOLIC BLOOD PRESSURE < 80 MM HG: ICD-10-PCS | Mod: CPTII,S$GLB,, | Performed by: FAMILY MEDICINE

## 2023-02-06 PROCEDURE — 85610 PROTHROMBIN TIME: CPT | Mod: HCNC,PO | Performed by: STUDENT IN AN ORGANIZED HEALTH CARE EDUCATION/TRAINING PROGRAM

## 2023-02-06 PROCEDURE — 3074F SYST BP LT 130 MM HG: CPT | Mod: CPTII,S$GLB,, | Performed by: FAMILY MEDICINE

## 2023-02-06 PROCEDURE — 3288F FALL RISK ASSESSMENT DOCD: CPT | Mod: CPTII,S$GLB,, | Performed by: FAMILY MEDICINE

## 2023-02-06 PROCEDURE — 99214 OFFICE O/P EST MOD 30 MIN: CPT | Mod: S$GLB,,, | Performed by: FAMILY MEDICINE

## 2023-02-06 PROCEDURE — 1101F PT FALLS ASSESS-DOCD LE1/YR: CPT | Mod: CPTII,S$GLB,, | Performed by: FAMILY MEDICINE

## 2023-02-06 PROCEDURE — 99214 PR OFFICE/OUTPT VISIT, EST, LEVL IV, 30-39 MIN: ICD-10-PCS | Mod: S$GLB,,, | Performed by: FAMILY MEDICINE

## 2023-02-06 RX ORDER — ALENDRONATE SODIUM 35 MG/1
35 TABLET ORAL WEEKLY
Qty: 12 TABLET | Refills: 1 | Status: SHIPPED | OUTPATIENT
Start: 2023-02-06

## 2023-02-06 RX ORDER — SEMAGLUTIDE 1.34 MG/ML
0.5 INJECTION, SOLUTION SUBCUTANEOUS
Qty: 3 PEN | Refills: 11 | Status: SHIPPED | OUTPATIENT
Start: 2023-02-06 | End: 2024-02-06

## 2023-02-06 RX ORDER — INSULIN GLARGINE 100 [IU]/ML
INJECTION, SOLUTION SUBCUTANEOUS
Qty: 60 ML | Refills: 6 | Status: SHIPPED | OUTPATIENT
Start: 2023-02-06

## 2023-02-06 RX ORDER — TRAMADOL HYDROCHLORIDE 50 MG/1
TABLET ORAL
Qty: 180 EACH | Refills: 0 | Status: SHIPPED | OUTPATIENT
Start: 2023-02-06 | End: 2023-06-13 | Stop reason: SDUPTHER

## 2023-02-06 RX ORDER — METOPROLOL SUCCINATE 50 MG/1
25 TABLET, EXTENDED RELEASE ORAL DAILY
Qty: 90 TABLET | Refills: 3 | Status: SHIPPED | OUTPATIENT
Start: 2023-02-06

## 2023-02-06 RX ORDER — PRAVASTATIN SODIUM 40 MG/1
40 TABLET ORAL NIGHTLY
Qty: 90 TABLET | Refills: 3 | Status: SHIPPED | OUTPATIENT
Start: 2023-02-06 | End: 2023-05-16 | Stop reason: SDUPTHER

## 2023-02-07 ENCOUNTER — ANTI-COAG VISIT (OUTPATIENT)
Dept: CARDIOLOGY | Facility: CLINIC | Age: 78
End: 2023-02-07
Payer: MEDICARE

## 2023-02-07 DIAGNOSIS — Z79.01 LONG TERM (CURRENT) USE OF ANTICOAGULANTS: ICD-10-CM

## 2023-02-07 DIAGNOSIS — I48.91 ATRIAL FIBRILLATION WITH RVR: Primary | ICD-10-CM

## 2023-02-07 DIAGNOSIS — Z00.00 ENCOUNTER FOR MEDICARE ANNUAL WELLNESS EXAM: ICD-10-CM

## 2023-02-07 PROCEDURE — 93793 PR ANTICOAGULANT MGMT FOR PT TAKING WARFARIN: ICD-10-PCS | Mod: S$GLB,,,

## 2023-02-07 PROCEDURE — 93793 ANTICOAG MGMT PT WARFARIN: CPT | Mod: S$GLB,,,

## 2023-02-08 ENCOUNTER — TELEPHONE (OUTPATIENT)
Dept: FAMILY MEDICINE | Facility: CLINIC | Age: 78
End: 2023-02-08
Payer: MEDICARE

## 2023-02-08 ENCOUNTER — TELEPHONE (OUTPATIENT)
Dept: CARDIOLOGY | Facility: CLINIC | Age: 78
End: 2023-02-08
Payer: MEDICARE

## 2023-02-08 DIAGNOSIS — M17.11 PRIMARY OSTEOARTHRITIS OF RIGHT KNEE: ICD-10-CM

## 2023-02-08 DIAGNOSIS — Z79.4 TYPE 2 DIABETES MELLITUS WITH STAGE 4 CHRONIC KIDNEY DISEASE, WITH LONG-TERM CURRENT USE OF INSULIN: Primary | ICD-10-CM

## 2023-02-08 DIAGNOSIS — M17.12 PRIMARY OSTEOARTHRITIS OF LEFT KNEE: ICD-10-CM

## 2023-02-08 DIAGNOSIS — N18.4 TYPE 2 DIABETES MELLITUS WITH STAGE 4 CHRONIC KIDNEY DISEASE, WITH LONG-TERM CURRENT USE OF INSULIN: Primary | ICD-10-CM

## 2023-02-08 DIAGNOSIS — E11.22 TYPE 2 DIABETES MELLITUS WITH STAGE 4 CHRONIC KIDNEY DISEASE, WITH LONG-TERM CURRENT USE OF INSULIN: Primary | ICD-10-CM

## 2023-02-08 NOTE — TELEPHONE ENCOUNTER
2/8/23 spoke to patient and she stated that she had a visit with her PCP Dr. Leon and he suggested that she cut her Metoprolol in half due to her heart rate being on the lower side, her breathing is a little off and she has a cough.  Patient would like advice form Dr. Patrick.  Writer instructed patient to go the ED for evaluation for her breathing and patient stated that she was okay and will go if it worsens.  Noted.  SRM

## 2023-02-08 NOTE — TELEPHONE ENCOUNTER
----- Message from Reina Ferrera sent at 2/8/2023  8:37 AM CST -----  .Type:  Patient Call Back    Who Called: PT       Does the patient know what this is regarding?: PT CALLED TO GET ANOTHER REFERRAL TO P.T SHE WANTS TO GO TO THE [facility] LOCATION      Would the patient rather a call back YES     Best Call Back Number: [phone]    Additional Information: Thank You

## 2023-02-08 NOTE — TELEPHONE ENCOUNTER
----- Message from Precious Ferrera sent at 2/8/2023  2:02 PM CST -----  Type:  Needs Medical Advice    Who Called: pt  Symptoms (please be specific): pt was told that your heart beat beating slower, and breathing is off wants to know what the best thing for her to feel better  Would the patient rather a call back or a response via [facility]? call  Best Call Back Number: [phone]

## 2023-02-09 ENCOUNTER — TELEPHONE (OUTPATIENT)
Dept: CARDIOLOGY | Facility: CLINIC | Age: 78
End: 2023-02-09
Payer: MEDICARE

## 2023-02-09 DIAGNOSIS — Z00.00 ENCOUNTER FOR MEDICARE ANNUAL WELLNESS EXAM: ICD-10-CM

## 2023-02-09 NOTE — TELEPHONE ENCOUNTER
2/9/23 placed call to patient, to schedule follow up and evaluation, no answer left detailed voice message.  SRM

## 2023-02-10 ENCOUNTER — TELEPHONE (OUTPATIENT)
Dept: CARDIOLOGY | Facility: CLINIC | Age: 78
End: 2023-02-10
Payer: MEDICARE

## 2023-02-10 NOTE — TELEPHONE ENCOUNTER
----- Message from Jude Renee sent at 2/10/2023  2:30 PM CST -----  Type:  Patient Returning Call    Who Called:pt   Who Left Message for Patient:kim   Does the patient know what this is regarding?:appt/ medication metoprolol succinate (TOPROL-XL) 50 MG 24 hr tablet  Would the patient rather a call back or a response via [employer] [facility]? Call   Best Call Back Number: [phone]  Additional Information:     Pt stated she doesn't need to come in   She rather the info over the phone

## 2023-02-10 NOTE — TELEPHONE ENCOUNTER
2/14/23 patient called to cancel appointment and stated that she will just half the dose and keep her appointment in March 2023.  SRM

## 2023-02-10 NOTE — TELEPHONE ENCOUNTER
2/10/23 placed call to patient and scheduled appointment to see Dr. Patrick on 2/14/23 at 1320 in [place], patient verbalized understanding.  SRM

## 2023-02-10 NOTE — TELEPHONE ENCOUNTER
----- Message from Precious Liz MA sent at 2/10/2023 11:10 AM CST -----    ----- Message -----  From: Jude Renee  Sent: 2/10/2023   8:35 AM CST  To: [facility] Staff    Type:  Patient Returning Call    Who Called:pt  Who Left Message for Patient:Russell  Does the patient know what this is regarding?:yes   Would the patient rather a call back or a response via [email or web address]? Call   Best Call Back Number: [phone]  Additional Information:

## 2023-02-13 NOTE — PROGRESS NOTES
Patient ID: Yajaira Terry is a 77 y.o. female.    Chief Complaint: Establish Care    HPI      Yajaira Terry is a 77 y.o. female complaining that she cannot get her medications refilled.  That she had multiples cues is why she could not visit with Dr. Vargas.  Dr. Vargas refused to order medicines unless she came to see her.  Complains of osteoarthritis pain for which he uses Tylenol and has used tramadol in the past.  Use Pacerone for very toes or atrial fibrillation.  Currently taking metoprolol-25 milligrams daily.    Vitals:    02/06/23 1428   BP: 122/62   Pulse: (!) 46   SpO2: 95%   Weight: 93 kg (205 lb 0.4 oz)   Height: 5' (1.524 m)            Review of Symptoms      Physical Exam    Constitutional:  Oriented to person, place, and time.appears well-developed and well-nourished.  No distress.      HENT  Head: Normocephalic and atraumatic  Right Ear: External ear normal.   Left Ear: External ear normal.   Nose: External nose normal.   Mouth:  Moist mucus membranes.    Eyes:  Conjunctivae are normal. Right eye exhibits no discharge.  Left eye exhibits no discharge. No scleral icterus.  No periorbital edema    Cardiovascular:  Regular rate and rhythm with normal S1 and S2     Pulmonary/Chest:   Clear to auscultation bilaterally without wheezes, rhonchi or rales      Musculoskeletal:  No edema. No obvious deformity No wasting       Neurological:  Alert and oriented to person, place, and time.   Coordination normal.     Skin:   Skin is warm and dry.  No diaphoresis.   No rash noted.     Psychiatric: Normal mood and affect. Behavior is normal.  Judgment and thought content normal.     Complete Blood Count  Lab Results   Component Value Date    RBC 4.00 08/03/2022    HGB 12.9 08/03/2022    HCT 40.2 08/03/2022     (H) 08/03/2022    MCH 32.3 (H) 08/03/2022    MCHC 32.1 08/03/2022    RDW 12.2 08/03/2022     08/03/2022    MPV 10.0 08/03/2022    GRAN 6.1 08/03/2022    GRAN 69.4 08/03/2022    LYMPH 1.7  08/03/2022    LYMPH 19.2 08/03/2022    MONO 0.6 08/03/2022    MONO 6.9 08/03/2022    EOS 0.3 08/03/2022    BASO 0.05 08/03/2022    EOSINOPHIL 3.6 08/03/2022    BASOPHIL 0.6 08/03/2022    DIFFMETHOD Automated 08/03/2022       Comprehensive Metabolic Panel  No results found for: GLU, BUN, CREATININE, NA, K, CL, PROT, ALBUMIN, BILITOT, AST, ALKPHOS, CO2, ALT, ANIONGAP, EGFRNONAA, ESTGFRAFRICA    TSH  No results found for: TSH    Assessment / Plan:      ICD-10-CM ICD-9-CM   1. Type 2 diabetes mellitus with stage 4 chronic kidney disease, with long-term current use of insulin  E11.22 250.40    N18.4 585.4    Z79.4 V58.67   2. Atrial fibrillation with RVR  I48.91 427.31     Type 2 diabetes mellitus with stage 4 chronic kidney disease, with long-term current use of insulin  -     Hemoglobin A1C; Standing    Atrial fibrillation with RVR  -     metoprolol succinate (TOPROL-XL) 50 MG 24 hr tablet; Take 0.5 tablets (25 mg total) by mouth once daily.  Dispense: 90 tablet; Refill: 3    Other orders  -     alendronate (FOSAMAX) 35 MG tablet; Take 1 tablet (35 mg total) by mouth once a week.  Dispense: 12 tablet; Refill: 1  -     insulin (LANTUS SOLOSTAR U-100 INSULIN) glargine 100 units/mL SubQ pen; INJECT 60 UNITS SUBCUTANEOUSLY ONCE DAILY.  Dispense: 60 mL; Refill: 6  -     pravastatin (PRAVACHOL) 40 MG tablet; Take 1 tablet (40 mg total) by mouth every evening.  Dispense: 90 tablet; Refill: 3  -     traMADoL (ULTRAM) 50 mg tablet; TAKE 1 TABLET BY MOUTH EVERY 12 HOURS AS NEEDED FOR PAIN ( 3 mo supply)  Dispense: 180 each; Refill: 0  -     semaglutide (OZEMPIC) 0.25 mg or 0.5 mg(2 mg/1.5 mL) pen injector; Inject 0.5 mg into the skin every 7 days.  Dispense: 3 pen; Refill: 11    Bradycardia-reduce metoprolol-cut in half    Discussed use of tramadol medication.    Discussed use of Ozempic medication    Discussed use of Fosamax for bone density or    Spent over 25 minutes in discussion of medications for diabetes pain reduction  metoprolol for bradycardia.

## 2023-02-22 ENCOUNTER — PATIENT MESSAGE (OUTPATIENT)
Dept: BARIATRICS | Facility: CLINIC | Age: 78
End: 2023-02-22
Payer: MEDICARE

## 2023-02-23 ENCOUNTER — LAB VISIT (OUTPATIENT)
Dept: LAB | Facility: HOSPITAL | Age: 78
End: 2023-02-23
Attending: STUDENT IN AN ORGANIZED HEALTH CARE EDUCATION/TRAINING PROGRAM
Payer: MEDICARE

## 2023-02-23 DIAGNOSIS — Z79.01 LONG TERM (CURRENT) USE OF ANTICOAGULANTS: ICD-10-CM

## 2023-02-23 DIAGNOSIS — I48.91 ATRIAL FIBRILLATION WITH RVR: ICD-10-CM

## 2023-02-23 LAB
INR PPP: 2.5 (ref 0.8–1.2)
PROTHROMBIN TIME: 26.1 SEC (ref 9–12.5)

## 2023-02-23 PROCEDURE — 85610 PROTHROMBIN TIME: CPT | Mod: HCNC,PO | Performed by: STUDENT IN AN ORGANIZED HEALTH CARE EDUCATION/TRAINING PROGRAM

## 2023-02-23 PROCEDURE — 36415 COLL VENOUS BLD VENIPUNCTURE: CPT | Mod: HCNC,PO | Performed by: STUDENT IN AN ORGANIZED HEALTH CARE EDUCATION/TRAINING PROGRAM

## 2023-02-24 ENCOUNTER — ANTI-COAG VISIT (OUTPATIENT)
Dept: CARDIOLOGY | Facility: CLINIC | Age: 78
End: 2023-02-24
Payer: MEDICARE

## 2023-02-24 DIAGNOSIS — Z79.01 LONG TERM (CURRENT) USE OF ANTICOAGULANTS: ICD-10-CM

## 2023-02-24 DIAGNOSIS — I48.91 ATRIAL FIBRILLATION WITH RVR: Primary | ICD-10-CM

## 2023-02-24 PROCEDURE — 93793 ANTICOAG MGMT PT WARFARIN: CPT | Mod: S$GLB,,,

## 2023-02-24 PROCEDURE — 93793 PR ANTICOAGULANT MGMT FOR PT TAKING WARFARIN: ICD-10-PCS | Mod: S$GLB,,,

## 2023-02-28 ENCOUNTER — TELEPHONE (OUTPATIENT)
Dept: FAMILY MEDICINE | Facility: CLINIC | Age: 78
End: 2023-02-28
Payer: MEDICARE

## 2023-02-28 NOTE — TELEPHONE ENCOUNTER
----- Message from Keily Willoughby sent at 2/28/2023  9:37 AM CST -----  Type:  Needs Medical Advice    Who Called:  pt  Symptoms (please be specific):  calling to ask  To please respond to Dental office requesting clearance for dental service needed      Would the patient rather a call back or a response via [facility]?  Call   Best Call Back Number:  [phone]   Additional Information:  [facility] on [facility] [phone]      Did you receive this paperwork?

## 2023-03-20 ENCOUNTER — NURSE TRIAGE (OUTPATIENT)
Dept: ADMINISTRATIVE | Facility: CLINIC | Age: 78
End: 2023-03-20
Payer: MEDICARE

## 2023-03-20 NOTE — TELEPHONE ENCOUNTER
[place]    PCP:  Dr. Anders Leon    Pt escalated to triage queue for Medication Question.  Pt contacted.  Pt calling regarding following:    She reports needs 2 drs deleted from her chart because she doesn't see them anymore - Dr. Ra Vargas and Dr. Peggy Niño  She also wants to talk to Dr. Niño regarding the name of the medicine that MD was going to prescribe for her that she never got - Pt states that someone has already sent a message to the Provider regarding this question and do not send another message for this  She also needs to know what dosage of Ozempic she is supposed to be taking ([place]) because the order says 0.25 mg or 0.5 mg - She needs clarification for dosage    She denies any symptoms at this time therefore no triage required.  NT spoke with Supervisor and Supervisor states that pt should be able to have the Providers deleted from her care team on her next visit or try [facility] help line.  NT attempted to contact pt to provide recommendations regarding Providers being removed from her care team but NA; LVM; # verified.  2nd attempt: NA; LVM; # verified.  Message routed high priority to PCP.      Reason for Disposition   [1] Follow-up call from patient regarding patient's clinical status AND [2] information NON-URGENT    Additional Information   Negative: ED call to PCP (i.e., primary care provider; doctor, NP, or PA)   Negative: Doctor (or NP/PA) call to PCP   Negative: Call about patient who is currently hospitalized   Negative: Lab or radiology calling with CRITICAL test results   Negative: [1] Follow-up call from patient regarding patient's clinical status AND [2] information urgent   Negative: [1] Caller requests to speak ONLY to PCP AND [2] URGENT question   Negative: [1] Caller requests to speak to PCP now AND [2] won't tell us reason for call  (Exception: If 10 pm to 6 am, caller must first discuss reason for the call.)   Negative: Notification of hospital admission   Negative:  Notification of death   Negative: Caller requesting lab results  (Exception: Routine or non-urgent lab result.)   Negative: Lab or radiology calling with test results    Protocols used: PCP Call - No Triage-A-AH

## 2023-03-20 NOTE — TELEPHONE ENCOUNTER
I suggest taking the 0.5 milligram dose    The prescription says Ozempic 0.25 or 0.5 injection device    The prescription read inject 0.5 milligrams into the skin every seven days.  It was written on 02/06/2023

## 2023-03-22 ENCOUNTER — LAB VISIT (OUTPATIENT)
Dept: LAB | Facility: HOSPITAL | Age: 78
End: 2023-03-22
Attending: STUDENT IN AN ORGANIZED HEALTH CARE EDUCATION/TRAINING PROGRAM
Payer: MEDICARE

## 2023-03-22 DIAGNOSIS — I48.91 ATRIAL FIBRILLATION WITH RVR: ICD-10-CM

## 2023-03-22 DIAGNOSIS — Z79.01 LONG TERM (CURRENT) USE OF ANTICOAGULANTS: ICD-10-CM

## 2023-03-22 LAB
INR PPP: 2.6 (ref 0.8–1.2)
PROTHROMBIN TIME: 27.1 SEC (ref 9–12.5)

## 2023-03-22 PROCEDURE — 36415 COLL VENOUS BLD VENIPUNCTURE: CPT | Mod: HCNC,PO | Performed by: STUDENT IN AN ORGANIZED HEALTH CARE EDUCATION/TRAINING PROGRAM

## 2023-03-22 PROCEDURE — 85610 PROTHROMBIN TIME: CPT | Mod: HCNC,PO | Performed by: STUDENT IN AN ORGANIZED HEALTH CARE EDUCATION/TRAINING PROGRAM

## 2023-03-23 ENCOUNTER — ANTI-COAG VISIT (OUTPATIENT)
Dept: CARDIOLOGY | Facility: CLINIC | Age: 78
End: 2023-03-23
Payer: MEDICARE

## 2023-03-23 DIAGNOSIS — I48.91 ATRIAL FIBRILLATION WITH RVR: Primary | ICD-10-CM

## 2023-03-23 DIAGNOSIS — Z79.01 LONG TERM (CURRENT) USE OF ANTICOAGULANTS: ICD-10-CM

## 2023-03-23 PROCEDURE — 93793 PR ANTICOAGULANT MGMT FOR PT TAKING WARFARIN: ICD-10-PCS | Mod: S$GLB,,,

## 2023-03-23 PROCEDURE — 93793 ANTICOAG MGMT PT WARFARIN: CPT | Mod: S$GLB,,,

## 2023-04-04 NOTE — TELEPHONE ENCOUNTER
Patient states she's been constipated for about a week now. She took 3 fiber pills, 3 stool softeners but nothing happened. Does the small hernia's in her stomach have something to do with this?       ----- Message from Polo Saucedo sent at 11/20/2020 11:02 AM CST -----  Type:  Patient Returning Call    Who Called: Yajaira  Who Left Message for Patient: Nicci  Does the patient know what this is regarding?: question pt has  Would the patient rather a call back or a response via [facility]? Call back  Best Call Back Number: [phone]  Additional Information: none         alert and awake/follows commands

## 2023-04-13 ENCOUNTER — TELEPHONE (OUTPATIENT)
Dept: FAMILY MEDICINE | Facility: CLINIC | Age: 78
End: 2023-04-13
Payer: MEDICARE

## 2023-04-13 NOTE — TELEPHONE ENCOUNTER
Spoke to pt about sending in an order for a chair lift mechanism.    Please return to nurse to fax to [facility] to DME

## 2023-04-13 NOTE — TELEPHONE ENCOUNTER
----- Message from Cheryl Bolanos sent at 4/13/2023  2:12 PM CDT -----  .Type:  Needs Medical Advice    Who Called: pt   Would the patient rather a call back or a response via [facility]? call  Best Call Back Number: [phone]  Additional Information:     Requesting orders for a recliner w/ heat & massager because of weak legs, back pain and arthritis

## 2023-04-14 ENCOUNTER — TELEPHONE (OUTPATIENT)
Dept: FAMILY MEDICINE | Facility: CLINIC | Age: 78
End: 2023-04-14
Payer: MEDICARE

## 2023-04-14 RX ORDER — NITROFURANTOIN 25; 75 MG/1; MG/1
100 CAPSULE ORAL 2 TIMES DAILY
Qty: 10 CAPSULE | Refills: 0 | Status: SHIPPED | OUTPATIENT
Start: 2023-04-14 | End: 2023-04-17

## 2023-04-14 NOTE — TELEPHONE ENCOUNTER
I would prefer ordering physical therapy rather than a lift chair.  Do you get out of the house other than going to [facility] or [facility]?  If so we can order outpatient therapy

## 2023-04-14 NOTE — TELEPHONE ENCOUNTER
----- Message from Parul Oviedo sent at 4/14/2023  1:48 PM CDT -----  Pt Requesting Call Back    Who called: pt  Who called for pt:  Best call back #: [phone]  Add notes: pt says she requests Dr. Leon's nurse call her back; pt says she needs antibiotics for a bladder infection

## 2023-04-14 NOTE — TELEPHONE ENCOUNTER
----- Message from Mora Mendez sent at 4/14/2023  2:56 PM CDT -----  Regarding: advice  Contact: [phone]  Type:  Needs Medical Advice    Who Called:  self   Symptoms (please be specific):  UTI   How long has patient had these symptoms:   this morning   Pharmacy name and phone #:    [id number] - [address] AT [facility]  Would the patient rather a call back or a response via [facility]?  Call   Best Call Back Number:  [phone]  Additional Information:

## 2023-04-17 ENCOUNTER — TELEPHONE (OUTPATIENT)
Dept: FAMILY MEDICINE | Facility: CLINIC | Age: 78
End: 2023-04-17
Payer: MEDICARE

## 2023-04-17 RX ORDER — CEPHALEXIN 500 MG/1
500 CAPSULE ORAL EVERY 8 HOURS
Qty: 15 CAPSULE | Refills: 0 | Status: SHIPPED | OUTPATIENT
Start: 2023-04-17 | End: 2023-05-15 | Stop reason: SDUPTHER

## 2023-04-17 NOTE — TELEPHONE ENCOUNTER
Spoke to pt and stated MD would prefer doing physical therapy. Pt stated that is mean of Dr. Chanel for ordering chair lift, and that pt does get out of the house she goes to the stores. Pt stated she is already on the wait list for a physical therapy place. I did inform pt [facility]

## 2023-04-17 NOTE — TELEPHONE ENCOUNTER
----- Message from Radha Chao sent at 4/17/2023 12:32 PM CDT -----  Type: Pt Calling to Clarify an RX    Name of Caller:pt  Prescription Name:nitrofurantoin, macrocrystal-monohydrate, (MACROBID) 100 MG capsule  What do they need to clarify?:medication is not covered under pt insurance   Best Call Back Number:[phone]  Additional Information: pt is very upset states that it was not covered due to medication can be harmful to her health due to kidney disease please call would like to discuss out of pocket payment and new script to be called in        I have not called her yet.   Can you change this medication?

## 2023-04-18 NOTE — TELEPHONE ENCOUNTER
What do you mean on the waiting list for PT place?  For regular PT there is no room? Or she needs a certain time?

## 2023-04-20 ENCOUNTER — LAB VISIT (OUTPATIENT)
Dept: LAB | Facility: HOSPITAL | Age: 78
End: 2023-04-20
Attending: STUDENT IN AN ORGANIZED HEALTH CARE EDUCATION/TRAINING PROGRAM
Payer: MEDICARE

## 2023-04-20 DIAGNOSIS — I48.91 ATRIAL FIBRILLATION WITH RVR: ICD-10-CM

## 2023-04-20 DIAGNOSIS — Z79.01 LONG TERM (CURRENT) USE OF ANTICOAGULANTS: ICD-10-CM

## 2023-04-20 LAB
INR PPP: 3.5 (ref 0.8–1.2)
PROTHROMBIN TIME: 34.9 SEC (ref 9–12.5)

## 2023-04-20 PROCEDURE — 36415 COLL VENOUS BLD VENIPUNCTURE: CPT | Mod: HCNC,PO | Performed by: STUDENT IN AN ORGANIZED HEALTH CARE EDUCATION/TRAINING PROGRAM

## 2023-04-20 PROCEDURE — 85610 PROTHROMBIN TIME: CPT | Mod: HCNC,PO | Performed by: STUDENT IN AN ORGANIZED HEALTH CARE EDUCATION/TRAINING PROGRAM

## 2023-04-21 ENCOUNTER — TELEPHONE (OUTPATIENT)
Dept: FAMILY MEDICINE | Facility: CLINIC | Age: 78
End: 2023-04-21
Payer: MEDICARE

## 2023-04-21 ENCOUNTER — ANTI-COAG VISIT (OUTPATIENT)
Dept: CARDIOLOGY | Facility: CLINIC | Age: 78
End: 2023-04-21
Payer: MEDICARE

## 2023-04-21 DIAGNOSIS — I48.91 ATRIAL FIBRILLATION WITH RVR: ICD-10-CM

## 2023-04-21 DIAGNOSIS — Z79.01 LONG TERM (CURRENT) USE OF ANTICOAGULANTS: Primary | ICD-10-CM

## 2023-04-21 PROCEDURE — 93793 PR ANTICOAGULANT MGMT FOR PT TAKING WARFARIN: ICD-10-PCS | Mod: S$GLB,,,

## 2023-04-21 PROCEDURE — 93793 ANTICOAG MGMT PT WARFARIN: CPT | Mod: S$GLB,,,

## 2023-04-21 NOTE — TELEPHONE ENCOUNTER
Spoke to pt. I explained how to use the Ozempic pen to make sure that she is taking the correct dose.

## 2023-04-21 NOTE — TELEPHONE ENCOUNTER
----- Message from Yahir Santa sent at 4/21/2023  2:50 PM CDT -----  Contact: Pt  .Type:  Patient Returning Call    Who Called:Pt  Does the patient know what this is regarding?:yes  Would the patient rather a call back or a response via [facility]? call  Best Call Back Number:[phone]  Additional Information:

## 2023-04-21 NOTE — TELEPHONE ENCOUNTER
----- Message from Juhi Fernandez sent at 4/21/2023  1:35 PM CDT -----  Type:  Needs Medical Advice    Who Called:  Pt   Would the patient rather a call back or a response via [facility]?  call  Best Call Back Number:  [phone]  Additional Information:  Pt would like a call back from RN regarding her medication OZEMPIC.  Pt did not specify.

## 2023-04-21 NOTE — PROGRESS NOTES
INR high. Pt was on macrobid or cephalexin this past week for bladder infection. Also noted that she is on Ozempic since February. No other changes to cause high INR. INRs recently stable and possibly high due to infection. Adjust dose for today and reevaluate with labs as scheduled next week. Please advise pt to go in AM for labs instead of late in afternoon as ordered.     Noted pt no longer seeing Dr. Vargas. Updated provider to cardiologist, Dr. Patrick. She had requested removal of old providers in pt message.

## 2023-04-25 ENCOUNTER — TELEPHONE (OUTPATIENT)
Dept: FAMILY MEDICINE | Facility: CLINIC | Age: 78
End: 2023-04-25
Payer: MEDICARE

## 2023-04-25 NOTE — TELEPHONE ENCOUNTER
Spoke to Arpan with [facility] department. Answered the clinical questions for the Macrobid that was sent in at the beginning of the month for a UTI.

## 2023-04-25 NOTE — TELEPHONE ENCOUNTER
----- Message from Cheryl Hannon sent at 4/25/2023 11:27 AM CDT -----  Type:  Patient Returning Call    Who Called:Kar/ [facility]   Would the patient rather a call back or a response via [facility]? Call back   Best Call Back Number:[phone]   Additional Information: has a few clinical questions that need to be answered for pt PA

## 2023-05-01 ENCOUNTER — LAB VISIT (OUTPATIENT)
Dept: LAB | Facility: HOSPITAL | Age: 78
End: 2023-05-01
Attending: FAMILY MEDICINE
Payer: MEDICARE

## 2023-05-01 ENCOUNTER — ANTI-COAG VISIT (OUTPATIENT)
Dept: CARDIOLOGY | Facility: CLINIC | Age: 78
End: 2023-05-01
Payer: MEDICARE

## 2023-05-01 DIAGNOSIS — E11.22 TYPE 2 DIABETES MELLITUS WITH STAGE 4 CHRONIC KIDNEY DISEASE, WITH LONG-TERM CURRENT USE OF INSULIN: ICD-10-CM

## 2023-05-01 DIAGNOSIS — I48.91 ATRIAL FIBRILLATION WITH RVR: Primary | ICD-10-CM

## 2023-05-01 DIAGNOSIS — Z79.01 LONG TERM (CURRENT) USE OF ANTICOAGULANTS: ICD-10-CM

## 2023-05-01 DIAGNOSIS — Z79.4 TYPE 2 DIABETES MELLITUS WITH STAGE 4 CHRONIC KIDNEY DISEASE, WITH LONG-TERM CURRENT USE OF INSULIN: ICD-10-CM

## 2023-05-01 DIAGNOSIS — N18.4 TYPE 2 DIABETES MELLITUS WITH STAGE 4 CHRONIC KIDNEY DISEASE, WITH LONG-TERM CURRENT USE OF INSULIN: ICD-10-CM

## 2023-05-01 LAB
ESTIMATED AVG GLUCOSE: 114 MG/DL (ref 68–131)
HBA1C MFR BLD: 5.6 % (ref 4–5.6)

## 2023-05-01 PROCEDURE — 93793 PR ANTICOAGULANT MGMT FOR PT TAKING WARFARIN: ICD-10-PCS | Mod: S$GLB,,,

## 2023-05-01 PROCEDURE — 93793 ANTICOAG MGMT PT WARFARIN: CPT | Mod: S$GLB,,,

## 2023-05-01 PROCEDURE — 83036 HEMOGLOBIN GLYCOSYLATED A1C: CPT | Mod: HCNC | Performed by: FAMILY MEDICINE

## 2023-05-10 ENCOUNTER — TELEPHONE (OUTPATIENT)
Dept: FAMILY MEDICINE | Facility: CLINIC | Age: 78
End: 2023-05-10

## 2023-05-10 ENCOUNTER — LAB VISIT (OUTPATIENT)
Dept: LAB | Facility: HOSPITAL | Age: 78
End: 2023-05-10
Attending: INTERNAL MEDICINE
Payer: MEDICARE

## 2023-05-10 DIAGNOSIS — I48.91 ATRIAL FIBRILLATION WITH RVR: ICD-10-CM

## 2023-05-10 DIAGNOSIS — R39.9 UTI SYMPTOMS: Primary | ICD-10-CM

## 2023-05-10 DIAGNOSIS — Z79.01 LONG TERM (CURRENT) USE OF ANTICOAGULANTS: ICD-10-CM

## 2023-05-10 LAB
INR PPP: 2.4 (ref 0.8–1.2)
PROTHROMBIN TIME: 25 SEC (ref 9–12.5)

## 2023-05-10 PROCEDURE — 85610 PROTHROMBIN TIME: CPT | Mod: PO | Performed by: INTERNAL MEDICINE

## 2023-05-10 PROCEDURE — 36415 COLL VENOUS BLD VENIPUNCTURE: CPT | Mod: PO | Performed by: INTERNAL MEDICINE

## 2023-05-10 NOTE — TELEPHONE ENCOUNTER
----- Message from Kat Pettit sent at 5/10/2023  8:17 AM CDT -----  Type:  Needs Medical Advice    Who Called:  pt  Symptoms (please be specific):  bladder infection  -   How long has patient had these symptoms:    Pharmacy name and phone #:    Would the patient rather a call back or a response via [facility]?   Best Call Back Number: [phone]  Additional Information:no other information wants to be given only with the office    I  for the pt  for the pt to rtn call to discuss symptoms

## 2023-05-11 ENCOUNTER — ANTI-COAG VISIT (OUTPATIENT)
Dept: CARDIOLOGY | Facility: CLINIC | Age: 78
End: 2023-05-11
Payer: MEDICARE

## 2023-05-11 DIAGNOSIS — I48.91 ATRIAL FIBRILLATION WITH RVR: Primary | ICD-10-CM

## 2023-05-11 DIAGNOSIS — Z79.01 LONG TERM (CURRENT) USE OF ANTICOAGULANTS: ICD-10-CM

## 2023-05-11 LAB
LEFT EYE DM RETINOPATHY: POSITIVE
RIGHT EYE DM RETINOPATHY: POSITIVE

## 2023-05-11 PROCEDURE — 93793 PR ANTICOAGULANT MGMT FOR PT TAKING WARFARIN: ICD-10-PCS | Mod: S$GLB,,,

## 2023-05-11 PROCEDURE — 93793 ANTICOAG MGMT PT WARFARIN: CPT | Mod: S$GLB,,,

## 2023-05-11 NOTE — PROGRESS NOTES
INR at goal. Medications and chart reviewed. No changes noted to necessitate adjustment of warfarin or follow-up plan. See calendar.    Update 5/30: Pt called unable to get to lab due to hip pain. INR was due 5/24. Noted pt did contact PCP and has appt this morning. I have scheduled a lab today in hopes that while she is out, she can stop by lab.

## 2023-05-15 ENCOUNTER — TELEPHONE (OUTPATIENT)
Dept: FAMILY MEDICINE | Facility: CLINIC | Age: 78
End: 2023-05-15
Payer: MEDICARE

## 2023-05-15 RX ORDER — CEPHALEXIN 500 MG/1
500 CAPSULE ORAL EVERY 8 HOURS
Qty: 15 CAPSULE | Refills: 0 | Status: SHIPPED | OUTPATIENT
Start: 2023-05-15 | End: 2023-05-20

## 2023-05-15 NOTE — TELEPHONE ENCOUNTER
----- Message from Mora Mendez sent at 5/15/2023  2:04 PM CDT -----  Regarding: advice  Contact: [phone]  Type:  Needs Medical Advice    Who Called:  self   Symptoms (please be specific):  UTI and needs antibiotics and something for the pain  How long has patient had these symptoms:   yesterday   Pharmacy name and phone #:   [id number] - [place], [address] AT [facility]  Would the patient rather a call back or a response via [facility]?  Call to confirm meds are sent  Best Call Back Number:  [phone]  Additional Information:      Last week you called in abx the rx was canceled bc when I told her she said she was better and did not need it

## 2023-05-16 RX ORDER — PRAVASTATIN SODIUM 40 MG/1
40 TABLET ORAL NIGHTLY
Qty: 90 TABLET | Refills: 3 | Status: SHIPPED | OUTPATIENT
Start: 2023-05-16

## 2023-05-16 NOTE — TELEPHONE ENCOUNTER
Care Due:                  Date            Visit Type   Department     Provider  --------------------------------------------------------------------------------                                EP -                              PRIMARY      [facility] FAMILY  Last Visit: 02-      CARE ([facility])   MEDICINE       Anders Chanel                               -                              PRIMARY      [facility] FAMILY  Next Visit: 08-      CARE ([facility])   MEDICINE       Anders Chanel                                                            Last  Test          Frequency    Reason                     Performed    Due Date  --------------------------------------------------------------------------------    CMP.........  12 months..  furosemide, insulin,       08- 07-                             pravastatin..............    Lipid Panel.  12 months..  pravastatin..............  Not Found    Overdue    [employer] Embedded Care Due Messages. Reference number: [id number].   5/16/2023 12:20:10 PM CDT

## 2023-05-16 NOTE — TELEPHONE ENCOUNTER
----- Message from Radha Chao sent at 5/16/2023 11:58 AM CDT -----  Type:  RX Refill Request    Who Called: Suma With [facility]  Refill or New Rx:refill  RX Name and Strength:pravastatin (PRAVACHOL) 40 MG tablet  How is the patient currently taking it? (ex. 1XDay):pravastatin (PRAVACHOL) 40 MG tablet  Is this a 30 day or 90 day RX:90  Preferred Pharmacy with phone number:[facility] - [facility] [address]   Phone: [phone]  Fax:  [phone]  Local or Mail Order:mail  Ordering Provider: Anders Chanel   Would the patient rather a call back or a response via [facility]?   Best Call Back Number:  Additional Information:

## 2023-05-29 ENCOUNTER — TELEPHONE (OUTPATIENT)
Dept: FAMILY MEDICINE | Facility: CLINIC | Age: 78
End: 2023-05-29
Payer: MEDICARE

## 2023-05-29 NOTE — PROGRESS NOTES
5/29/23  patient called reporting she missed 5/24 lab appointment and was going to go Friday 5/26 but was unable to go due to having pain on her side, now having pain on L-buttock/hip area, applying pain cream but states very painful to walk, also reports a knot/bump inside the skin by hip area, also using Tylenol and Tramadol -50mg 1 every 8 hours, used heat on hip area now using ice, not sure when she can get to the lab, advised Patient to also call PCP with symptoms, also advised Patient to continue same dose of warfarin as per calendar and to call coumadin clinic if any medicines are given or changed

## 2023-05-29 NOTE — TELEPHONE ENCOUNTER
Spoke to pt. Pt stated that a couple of days ago she started to have extreme hip pain. Pt has tried using ice and heat, taking the current pain medication as prescribed. Scheduled the pt for an appt tomorrow at 9:40 am.

## 2023-05-29 NOTE — TELEPHONE ENCOUNTER
----- Message from Jude Renee sent at 5/29/2023  4:36 PM CDT -----      Type:  Needs Medical Advice    Who Called: pt  Symptoms (please be specific):    Pain  in left hip , patient also have a knot  Would the patient rather a call back or a response via [facility]? call  Best Call Back Number: [phone]  Additional Information: patient stated that she would like a call back today

## 2023-05-30 ENCOUNTER — PATIENT OUTREACH (OUTPATIENT)
Dept: ADMINISTRATIVE | Facility: HOSPITAL | Age: 78
End: 2023-05-30
Payer: MEDICARE

## 2023-05-30 ENCOUNTER — TELEPHONE (OUTPATIENT)
Dept: FAMILY MEDICINE | Facility: CLINIC | Age: 78
End: 2023-05-30

## 2023-05-30 ENCOUNTER — OFFICE VISIT (OUTPATIENT)
Dept: FAMILY MEDICINE | Facility: CLINIC | Age: 78
End: 2023-05-30
Payer: MEDICARE

## 2023-05-30 ENCOUNTER — LAB VISIT (OUTPATIENT)
Dept: LAB | Facility: HOSPITAL | Age: 78
End: 2023-05-30
Attending: INTERNAL MEDICINE
Payer: MEDICARE

## 2023-05-30 ENCOUNTER — ANTI-COAG VISIT (OUTPATIENT)
Dept: CARDIOLOGY | Facility: CLINIC | Age: 78
End: 2023-05-30
Payer: MEDICARE

## 2023-05-30 VITALS
HEIGHT: 60 IN | TEMPERATURE: 98 F | HEART RATE: 55 BPM | DIASTOLIC BLOOD PRESSURE: 64 MMHG | SYSTOLIC BLOOD PRESSURE: 124 MMHG | OXYGEN SATURATION: 96 % | BODY MASS INDEX: 40.04 KG/M2

## 2023-05-30 DIAGNOSIS — Z79.4 TYPE 2 DIABETES MELLITUS WITH STAGE 4 CHRONIC KIDNEY DISEASE, WITH LONG-TERM CURRENT USE OF INSULIN: ICD-10-CM

## 2023-05-30 DIAGNOSIS — N18.4 TYPE 2 DIABETES MELLITUS WITH STAGE 4 CHRONIC KIDNEY DISEASE, WITH LONG-TERM CURRENT USE OF INSULIN: ICD-10-CM

## 2023-05-30 DIAGNOSIS — R39.9 UTI SYMPTOMS: Primary | ICD-10-CM

## 2023-05-30 DIAGNOSIS — Z79.01 LONG TERM (CURRENT) USE OF ANTICOAGULANTS: ICD-10-CM

## 2023-05-30 DIAGNOSIS — S76.012A MUSCLE STRAIN OF LEFT GLUTEAL REGION, INITIAL ENCOUNTER: ICD-10-CM

## 2023-05-30 DIAGNOSIS — R60.9 EDEMA, UNSPECIFIED TYPE: ICD-10-CM

## 2023-05-30 DIAGNOSIS — I48.91 ATRIAL FIBRILLATION WITH RVR: ICD-10-CM

## 2023-05-30 DIAGNOSIS — E11.22 TYPE 2 DIABETES MELLITUS WITH STAGE 4 CHRONIC KIDNEY DISEASE, WITH LONG-TERM CURRENT USE OF INSULIN: ICD-10-CM

## 2023-05-30 DIAGNOSIS — I48.91 ATRIAL FIBRILLATION WITH RVR: Primary | ICD-10-CM

## 2023-05-30 LAB
INR PPP: 3.9 (ref 0.8–1.2)
PROTHROMBIN TIME: 38.4 SEC (ref 9–12.5)

## 2023-05-30 PROCEDURE — 36415 COLL VENOUS BLD VENIPUNCTURE: CPT | Mod: PO | Performed by: INTERNAL MEDICINE

## 2023-05-30 PROCEDURE — 20552 TRIGGER POINT INJECTION: ICD-10-PCS | Mod: S$GLB,,, | Performed by: FAMILY MEDICINE

## 2023-05-30 PROCEDURE — 99214 OFFICE O/P EST MOD 30 MIN: CPT | Mod: 25,S$GLB,, | Performed by: FAMILY MEDICINE

## 2023-05-30 PROCEDURE — 1160F PR REVIEW ALL MEDS BY PRESCRIBER/CLIN PHARMACIST DOCUMENTED: ICD-10-PCS | Mod: CPTII,S$GLB,, | Performed by: FAMILY MEDICINE

## 2023-05-30 PROCEDURE — 1125F PR PAIN SEVERITY QUANTIFIED, PAIN PRESENT: ICD-10-PCS | Mod: CPTII,S$GLB,, | Performed by: FAMILY MEDICINE

## 2023-05-30 PROCEDURE — 3078F PR MOST RECENT DIASTOLIC BLOOD PRESSURE < 80 MM HG: ICD-10-PCS | Mod: CPTII,S$GLB,, | Performed by: FAMILY MEDICINE

## 2023-05-30 PROCEDURE — 1160F RVW MEDS BY RX/DR IN RCRD: CPT | Mod: CPTII,S$GLB,, | Performed by: FAMILY MEDICINE

## 2023-05-30 PROCEDURE — 1101F PR PT FALLS ASSESS DOC 0-1 FALLS W/OUT INJ PAST YR: ICD-10-PCS | Mod: CPTII,S$GLB,, | Performed by: FAMILY MEDICINE

## 2023-05-30 PROCEDURE — 20552 NJX 1/MLT TRIGGER POINT 1/2: CPT | Mod: S$GLB,,, | Performed by: FAMILY MEDICINE

## 2023-05-30 PROCEDURE — 1159F PR MEDICATION LIST DOCUMENTED IN MEDICAL RECORD: ICD-10-PCS | Mod: CPTII,S$GLB,, | Performed by: FAMILY MEDICINE

## 2023-05-30 PROCEDURE — 99214 PR OFFICE/OUTPT VISIT, EST, LEVL IV, 30-39 MIN: ICD-10-PCS | Mod: 25,S$GLB,, | Performed by: FAMILY MEDICINE

## 2023-05-30 PROCEDURE — 3078F DIAST BP <80 MM HG: CPT | Mod: CPTII,S$GLB,, | Performed by: FAMILY MEDICINE

## 2023-05-30 PROCEDURE — 3074F PR MOST RECENT SYSTOLIC BLOOD PRESSURE < 130 MM HG: ICD-10-PCS | Mod: CPTII,S$GLB,, | Performed by: FAMILY MEDICINE

## 2023-05-30 PROCEDURE — 85610 PROTHROMBIN TIME: CPT | Mod: PO | Performed by: INTERNAL MEDICINE

## 2023-05-30 PROCEDURE — 1125F AMNT PAIN NOTED PAIN PRSNT: CPT | Mod: CPTII,S$GLB,, | Performed by: FAMILY MEDICINE

## 2023-05-30 PROCEDURE — 93793 ANTICOAG MGMT PT WARFARIN: CPT | Mod: S$GLB,,,

## 2023-05-30 PROCEDURE — 3288F PR FALLS RISK ASSESSMENT DOCUMENTED: ICD-10-PCS | Mod: CPTII,S$GLB,, | Performed by: FAMILY MEDICINE

## 2023-05-30 PROCEDURE — 3074F SYST BP LT 130 MM HG: CPT | Mod: CPTII,S$GLB,, | Performed by: FAMILY MEDICINE

## 2023-05-30 PROCEDURE — 3288F FALL RISK ASSESSMENT DOCD: CPT | Mod: CPTII,S$GLB,, | Performed by: FAMILY MEDICINE

## 2023-05-30 PROCEDURE — 93793 PR ANTICOAGULANT MGMT FOR PT TAKING WARFARIN: ICD-10-PCS | Mod: S$GLB,,,

## 2023-05-30 PROCEDURE — 1101F PT FALLS ASSESS-DOCD LE1/YR: CPT | Mod: CPTII,S$GLB,, | Performed by: FAMILY MEDICINE

## 2023-05-30 PROCEDURE — 1159F MED LIST DOCD IN RCRD: CPT | Mod: CPTII,S$GLB,, | Performed by: FAMILY MEDICINE

## 2023-05-30 NOTE — PROGRESS NOTES
INR high. Pt has been in terrible pain with hip. She saw PCP today and received a steroid injection. Also noticed being worked up for UTI. Please advise to call if new meds are started or called in. Ozempic mentioned in pt findings but we were already aware of this since February. Decrease dose for now while having acute health change

## 2023-05-31 ENCOUNTER — TELEPHONE (OUTPATIENT)
Dept: FAMILY MEDICINE | Facility: CLINIC | Age: 78
End: 2023-05-31
Payer: MEDICARE

## 2023-05-31 RX ORDER — HYDROCODONE BITARTRATE AND ACETAMINOPHEN 5; 325 MG/1; MG/1
1 TABLET ORAL EVERY 6 HOURS PRN
Qty: 15 TABLET | Refills: 0 | Status: SHIPPED | OUTPATIENT
Start: 2023-05-31 | End: 2023-08-16

## 2023-05-31 RX ORDER — DOXYCYCLINE HYCLATE 100 MG
100 TABLET ORAL 2 TIMES DAILY
Qty: 20 TABLET | Refills: 0 | Status: SHIPPED | OUTPATIENT
Start: 2023-05-31 | End: 2023-08-16

## 2023-05-31 NOTE — TELEPHONE ENCOUNTER
Spoke to pt. Pt asked if we got the results for her urine studies. I explained that we did but that it was outside of my scope of practice to interpret results. Pt explained that the steroid shot has not really helped with the pain. When I explained that some of the symptoms of a UTI she stated that she has never had flank pain with previous UTI's and that something else has to be going on.     Pt is still asking for pain medication and also the results from her urine studies.

## 2023-05-31 NOTE — TELEPHONE ENCOUNTER
Attempted pt. LVM to call back for recommendations from provider and to let her know what medications have been send in for her.

## 2023-05-31 NOTE — TELEPHONE ENCOUNTER
Vinod HUTCHINSON [facility] Staff  Caller: Pt (Today, 10:33 AM)  .Type:  Needs Medical Advice     Who Called: pt     Would the patient rather a call back or a response via [facility]?  Call back   Best Call Back Number:[phone]     Additional Information:  Pt. Is requesting a call back regarding her test results. Pt. Is requesting something for pain.

## 2023-05-31 NOTE — TELEPHONE ENCOUNTER
----- Message from Kat Pettit sent at 5/31/2023  8:14 AM CDT -----  Type:  Needs Medical Advice    Who Called:  pt  Symptoms (please be specific):    How long has patient had these symptoms:    Pharmacy name and phone #:    Would the patient rather a call back or a response via [facility]?   Best Call Back Number: [phone]  Additional Information: wants to speak to offie about treatment that was given to her yesterday. It didn't work

## 2023-06-01 NOTE — TELEPHONE ENCOUNTER
Spoke to pt. Pt verbally understood. Pt is asking what else she can do stating that the Norco she has is helping a little but is not providing full relief.

## 2023-06-01 NOTE — TELEPHONE ENCOUNTER
She would like to know if it could be sciatic nerve pain and not related to the bladder    Should she have an anti- inflammatory

## 2023-06-01 NOTE — TELEPHONE ENCOUNTER
It maybe sciatic pain but she cannot take an anti inflammatory medication like motrin or aleeve bc she is on coumadin

## 2023-06-01 NOTE — TELEPHONE ENCOUNTER
Message  Received: Today   Pt Advice  Pritesh HUTCHINSON [facility] Staff  Caller: pt (Today,  9:32 AM)  Type: Requesting to speak with nurse         Who Called: PT   Regarding: have been treated for sciatic nerve issues by Dr. Vargas in the past and requesting pain medication   Would the patient rather a call back or a response via [facility]? Call back   Best Call Back Number: [phone]

## 2023-06-02 NOTE — PROGRESS NOTES
Pt changed to doxy for UTI for 10 days.  As her INR was already supratherapeutic there is still a concern for DDI.  Pt reduced her dosing 6/1.  Will r/s f/u per calendar and adjust as necessary.

## 2023-06-02 NOTE — PROGRESS NOTES
6/02/23 Patient called to report she has another bladder infection and 6/01 started Doxycycline-Hyclate 100mg twice a day for 10 days, had been on another antibiotic previously - can't recall the name, also taking Hydrocodone-acetaminophen, took 1.5mg of warfarin 6/01

## 2023-06-04 RX ORDER — TRIAMCINOLONE ACETONIDE 40 MG/ML
40 INJECTION, SUSPENSION INTRA-ARTICULAR; INTRAMUSCULAR
Status: DISCONTINUED | OUTPATIENT
Start: 2023-06-04 | End: 2023-06-04 | Stop reason: HOSPADM

## 2023-06-04 RX ADMIN — TRIAMCINOLONE ACETONIDE 40 MG: 40 INJECTION, SUSPENSION INTRA-ARTICULAR; INTRAMUSCULAR at 03:06

## 2023-06-04 NOTE — PROGRESS NOTES
Patient ID: Yajaira Terry is a 77 y.o. female.    Chief Complaint: Pain    HPI       Yajaira Terry is a 77 y.o. female here because pain in her hip.  When she points it is around the left sacroiliac joint.  Complains of pain sometimes radiating down her leg with no complaints of pain being such as a lightening burn or neuropathic type pain.  Complains of UTI symptoms with occasional dysuria and frequency.  No fever chills.  No nausea vomiting diarrhea.      Review of Symptoms      /64 (BP Location: Right arm, Patient Position: Sitting)   Pulse (!) 55   Temp 97.6 °F (36.4 °C) (Oral)   Ht 5' (1.524 m)   SpO2 96%   BMI 40.04 kg/m²     Physical Exam    Vitals:    05/30/23 1000   BP: 124/64   BP Location: Right arm   Patient Position: Sitting   Pulse: (!) 55   Temp: 97.6 °F (36.4 °C)   TempSrc: Oral   SpO2: 96%   Height: 5' (1.524 m)       Constitutional:   Oriented to person, place, and time.appears well-developed and well-nourished.   No distress.      HENT  Head: Normocephalic and atraumatic  Right Ear: External ear normal.   Left Ear: External ear normal.   Nose: External nose normal.   Mouth: Moist mucous membranes    Eyes:   Conjunctivae are normal. Right eye exhibits no discharge. Left eye exhibits no discharge. No scleral icterus. No periorbital edema    Musculoskeletal:  No edema. No obvious deformity No wasting  Tender to palpation along left SI joint superior aspect     Neurological:  Alert and oriented to person, place, and time. Coordination normal.     Skin:   Skin is warm and dry.  No diaphoresis.   No rash noted.     Psychiatric: Normal mood and affect. Behavior is normal. Judgment and thought content normal.     Complete Blood Count  Lab Results   Component Value Date    RBC 4.00 08/03/2022    HGB 12.9 08/03/2022    HCT 40.2 08/03/2022     (H) 08/03/2022    MCH 32.3 (H) 08/03/2022    MCHC 32.1 08/03/2022    RDW 12.2 08/03/2022     08/03/2022    MPV 10.0 08/03/2022    GRAN  6.1 08/03/2022    GRAN 69.4 08/03/2022    LYMPH 1.7 08/03/2022    LYMPH 19.2 08/03/2022    MONO 0.6 08/03/2022    MONO 6.9 08/03/2022    EOS 0.3 08/03/2022    BASO 0.05 08/03/2022    EOSINOPHIL 3.6 08/03/2022    BASOPHIL 0.6 08/03/2022    DIFFMETHOD Automated 08/03/2022       Comprehensive Metabolic Panel  No results found for: GLU, BUN, CREATININE, NA, K, CL, PROT, ALBUMIN, BILITOT, AST, ALKPHOS, CO2, ALT, ANIONGAP, EGFRNONAA, ESTGFRAFRICA    TSH  No results found for: TSH    Assessment / Plan:      ICD-10-CM ICD-9-CM   1. UTI symptoms  R39.9 788.99   2. Type 2 diabetes mellitus with stage 4 chronic kidney disease, with long-term current use of insulin  E11.22 250.40    N18.4 585.4    Z79.4 V58.67   3. Edema, unspecified type  R60.9 782.3     UTI symptoms  -     Cancel: Urinalysis, Reflex to Urine Culture Urine, Clean Catch  -     Cancel: Urinalysis; Future; Expected date: 05/30/2023  -     Cancel: Urinalysis; Future; Expected date: 05/30/2023  -     Cancel: Urine culture; Future  -     Urine culture; Future; Expected date: 05/30/2023  -     Urinalysis; Future; Expected date: 05/30/2023    Type 2 diabetes mellitus with stage 4 chronic kidney disease, with long-term current use of insulin  -     Microalbumin/Creatinine Ratio, Urine; Future; Expected date: 05/30/2023  -     MICROALBUMIN / CREATININE RATIO URINE; Future; Expected date: 05/30/2023    Edema, unspecified type    Trigger point injection at the site of her pain

## 2023-06-04 NOTE — PROCEDURES
Trigger Point Injection  Performed by: Anders Chanel MD  Authorized by: Anders Chanel MD     Pre-Procedure:   Indications:  Pain  Site marked: the procedure site was marked     Timeout: prior to procedure the correct patient, procedure, and site was verified        Local anesthesia used?: No    Anesthesia:  Local infiltration  Local anesthetic:  Lidocaine 1% without epinephrine  Medications: 40 mg triamcinolone acetonide 40 mg/mL  Sacral:  Left

## 2023-06-05 ENCOUNTER — LAB VISIT (OUTPATIENT)
Dept: LAB | Facility: HOSPITAL | Age: 78
End: 2023-06-05
Attending: INTERNAL MEDICINE
Payer: MEDICARE

## 2023-06-05 ENCOUNTER — ANTI-COAG VISIT (OUTPATIENT)
Dept: CARDIOLOGY | Facility: CLINIC | Age: 78
End: 2023-06-05
Payer: MEDICARE

## 2023-06-05 DIAGNOSIS — Z79.01 LONG TERM (CURRENT) USE OF ANTICOAGULANTS: ICD-10-CM

## 2023-06-05 DIAGNOSIS — I48.91 ATRIAL FIBRILLATION WITH RVR: ICD-10-CM

## 2023-06-05 DIAGNOSIS — I48.91 ATRIAL FIBRILLATION WITH RVR: Primary | ICD-10-CM

## 2023-06-05 LAB
INR PPP: 2.5 (ref 0.8–1.2)
PROTHROMBIN TIME: 25.6 SEC (ref 9–12.5)

## 2023-06-05 PROCEDURE — 85610 PROTHROMBIN TIME: CPT | Mod: PO | Performed by: INTERNAL MEDICINE

## 2023-06-05 PROCEDURE — 93793 ANTICOAG MGMT PT WARFARIN: CPT | Mod: ,,,

## 2023-06-05 PROCEDURE — 36415 COLL VENOUS BLD VENIPUNCTURE: CPT | Mod: PO | Performed by: INTERNAL MEDICINE

## 2023-06-05 PROCEDURE — 93793 PR ANTICOAGULANT MGMT FOR PT TAKING WARFARIN: ICD-10-PCS | Mod: ,,,

## 2023-06-05 NOTE — PROGRESS NOTES
INR at goal but completing course of doxycycline (DDI expected). Medications, chart, and patient findings reviewed. See calendar for adjustments to dose and follow up plan.

## 2023-06-06 ENCOUNTER — TELEPHONE (OUTPATIENT)
Dept: FAMILY MEDICINE | Facility: CLINIC | Age: 78
End: 2023-06-06
Payer: MEDICARE

## 2023-06-06 NOTE — TELEPHONE ENCOUNTER
----- Message from Mora Mendez sent at 6/6/2023  8:03 AM CDT -----  Regarding: advice  Contact: [phone]  Patient is requesting a call back regarding changing her antibiotics, giving her diarrhea   Would the patient rather a call back or a response via [facility]?   Best Call Back Number: [phone]   Additional Information: pt use to take kerflex and it did not cause diarrhea, still has 5 days of rx to take

## 2023-06-06 NOTE — TELEPHONE ENCOUNTER
She should go to the ER as her urine was not filled with infection and it did not grow out bacteria   so a uti is probably not the cause.

## 2023-06-06 NOTE — TELEPHONE ENCOUNTER
Spoke with pt and informed her pt stated that she has 101 fever asked pt if she felt she needed to seen at ER pt declined and stated that she would like another antibiotic called in

## 2023-06-06 NOTE — TELEPHONE ENCOUNTER
I spoke with the pt   She will not go to ER  She has decided to self test for covid bc she has a runny nose  No back pain and no bladder problems

## 2023-06-10 NOTE — TELEPHONE ENCOUNTER
No care due was identified.  [facility] Embedded Care Due Messages. Reference number: [id number].   6/10/2023 12:07:11 PM CDT

## 2023-06-11 RX ORDER — TRAMADOL HYDROCHLORIDE 50 MG/1
TABLET ORAL
Qty: 60 TABLET | Refills: 2 | OUTPATIENT
Start: 2023-06-11

## 2023-06-12 ENCOUNTER — LAB VISIT (OUTPATIENT)
Dept: LAB | Facility: HOSPITAL | Age: 78
End: 2023-06-12
Attending: INTERNAL MEDICINE
Payer: MEDICARE

## 2023-06-12 DIAGNOSIS — Z79.01 LONG TERM (CURRENT) USE OF ANTICOAGULANTS: ICD-10-CM

## 2023-06-12 DIAGNOSIS — I48.91 ATRIAL FIBRILLATION WITH RVR: ICD-10-CM

## 2023-06-12 LAB
INR PPP: 2.7 (ref 0.8–1.2)
PROTHROMBIN TIME: 27.4 SEC (ref 9–12.5)

## 2023-06-12 PROCEDURE — 85610 PROTHROMBIN TIME: CPT | Mod: PO | Performed by: INTERNAL MEDICINE

## 2023-06-12 PROCEDURE — 36415 COLL VENOUS BLD VENIPUNCTURE: CPT | Mod: PO | Performed by: INTERNAL MEDICINE

## 2023-06-12 RX ORDER — TRAMADOL HYDROCHLORIDE 50 MG/1
TABLET ORAL
Qty: 180 EACH | Refills: 0 | OUTPATIENT
Start: 2023-06-12

## 2023-06-12 NOTE — TELEPHONE ENCOUNTER
----- Message from Kat Pettit sent at 6/12/2023  8:59 AM CDT -----  Type:  RX Refill Request    Who Called:       traMADoL (ULTRAM) 50 mg tablet 180 each 0 2/6/2023  No  Sig: TAKE 1 TABLET BY MOUTH EVERY 12 HOURS AS NEEDED FOR PAIN ( 3 mo supply)  Sent to pharmacy as: traMADoL (ULTRAM) 50 mg tablet  Class: Normal  Notes to Pharmacy: Quantity medically necessary  Order: [id number]  Date/Time Signed: 2/6/2023 15:12      E-Prescribing Status: Receipt confirmed by pharmacy (2/6/2023  3:12 PM CST)              Pharmacy  [facility]/PHARMACY #[id number] - [place], [place] - [address] [facility]              Would the patient rather a call back or a response via [facility]?   Best Call Back Number:  Additional Information:

## 2023-06-12 NOTE — TELEPHONE ENCOUNTER
No care due was identified.  [facility] Embedded Care Due Messages. Reference number: [id number].   6/12/2023 9:04:33 AM CDT

## 2023-06-13 ENCOUNTER — ANTI-COAG VISIT (OUTPATIENT)
Dept: CARDIOLOGY | Facility: CLINIC | Age: 78
End: 2023-06-13
Payer: MEDICARE

## 2023-06-13 DIAGNOSIS — I48.91 ATRIAL FIBRILLATION WITH RVR: Primary | ICD-10-CM

## 2023-06-13 DIAGNOSIS — Z79.01 LONG TERM (CURRENT) USE OF ANTICOAGULANTS: ICD-10-CM

## 2023-06-13 PROCEDURE — 93793 ANTICOAG MGMT PT WARFARIN: CPT | Mod: ,,,

## 2023-06-13 PROCEDURE — 93793 PR ANTICOAGULANT MGMT FOR PT TAKING WARFARIN: ICD-10-PCS | Mod: ,,,

## 2023-06-13 RX ORDER — TRAMADOL HYDROCHLORIDE 50 MG/1
TABLET ORAL
Qty: 180 EACH | Refills: 0 | Status: SHIPPED | OUTPATIENT
Start: 2023-06-13 | End: 2023-09-28

## 2023-06-13 NOTE — TELEPHONE ENCOUNTER
----- Message from Pritesh Mathis sent at 6/13/2023  2:33 PM CDT -----  Contact: pt  Type: Requesting to speak with nurse        Who Called: PT  Regarding: traMADoL (ULTRAM) 50 mg tablet  Would the patient rather a call back or a response via [facility]? Call back  Best Call Back Number: [phone]  Additional Information:

## 2023-06-13 NOTE — TELEPHONE ENCOUNTER
No care due was identified.  [facility] Embedded Care Due Messages. Reference number: [id number].   6/13/2023 3:42:35 PM CDT

## 2023-06-14 ENCOUNTER — TELEPHONE (OUTPATIENT)
Dept: FAMILY MEDICINE | Facility: CLINIC | Age: 78
End: 2023-06-14
Payer: MEDICARE

## 2023-06-14 NOTE — TELEPHONE ENCOUNTER
----- Message from Fili Brown sent at 6/13/2023  5:24 PM CDT -----  .Type:  Sooner Apoointment Request    Caller is requesting a sooner appointment.  Caller declined first available appointment listed below.  Caller will not accept being placed on the waitlist and is requesting a message be sent to doctor.  Name of Caller:pt  When is the first available appointment?12/28  Symptoms:medication refill  Would the patient rather a call back or a response via [facility]? Call back  Best Call Back Number:[phone]  Additional Information:

## 2023-06-19 NOTE — PROGRESS NOTES
6/19/23 Patient called to reschedule today's lab appointment, refused lab 6/20 or this week, rescheduled to 6/26/23

## 2023-07-03 ENCOUNTER — LAB VISIT (OUTPATIENT)
Dept: LAB | Facility: HOSPITAL | Age: 78
End: 2023-07-03
Attending: INTERNAL MEDICINE
Payer: MEDICARE

## 2023-07-03 DIAGNOSIS — Z79.01 LONG TERM (CURRENT) USE OF ANTICOAGULANTS: ICD-10-CM

## 2023-07-03 DIAGNOSIS — I48.91 ATRIAL FIBRILLATION WITH RVR: ICD-10-CM

## 2023-07-03 LAB
INR PPP: 1.7 (ref 0.8–1.2)
PROTHROMBIN TIME: 18.3 SEC (ref 9–12.5)

## 2023-07-03 PROCEDURE — 85610 PROTHROMBIN TIME: CPT | Mod: HCNC,PO | Performed by: INTERNAL MEDICINE

## 2023-07-03 PROCEDURE — 36415 COLL VENOUS BLD VENIPUNCTURE: CPT | Mod: HCNC,PO | Performed by: INTERNAL MEDICINE

## 2023-07-05 ENCOUNTER — ANTI-COAG VISIT (OUTPATIENT)
Dept: CARDIOLOGY | Facility: CLINIC | Age: 78
End: 2023-07-05
Payer: MEDICARE

## 2023-07-05 DIAGNOSIS — I48.91 ATRIAL FIBRILLATION WITH RVR: Primary | ICD-10-CM

## 2023-07-05 DIAGNOSIS — Z79.01 LONG TERM (CURRENT) USE OF ANTICOAGULANTS: ICD-10-CM

## 2023-07-05 PROCEDURE — 93793 ANTICOAG MGMT PT WARFARIN: CPT | Mod: S$GLB,,,

## 2023-07-05 PROCEDURE — 93793 PR ANTICOAGULANT MGMT FOR PT TAKING WARFARIN: ICD-10-PCS | Mod: S$GLB,,,

## 2023-07-06 ENCOUNTER — TELEPHONE (OUTPATIENT)
Dept: ADMINISTRATIVE | Facility: OTHER | Age: 78
End: 2023-07-06
Payer: MEDICARE

## 2023-07-06 ENCOUNTER — TELEPHONE (OUTPATIENT)
Dept: CARDIOLOGY | Facility: CLINIC | Age: 78
End: 2023-07-06
Payer: MEDICARE

## 2023-07-06 NOTE — TELEPHONE ENCOUNTER
----- Message from Fili Brown sent at 7/6/2023 10:28 AM CDT -----  .Type:  Needs Medical Advice    Who Called: pt    Would the patient rather a call back or a response via [facility]? Call back  Best Call Back Number: [phone]  Additional Information:     Pt would like a call back please

## 2023-07-06 NOTE — TELEPHONE ENCOUNTER
Pt sent message for pt advice and I called pt and she was needing an appt to see Dr. Patrick due to her having leg swelling  I made her an appt but there were no sooner appts so I scheduled her for Sept but pt stated that was too far out for her and pt stated that she will call [employer] and see if she can get to see a  doctor sooner

## 2023-07-06 NOTE — TELEPHONE ENCOUNTER
Patient called back asking that the prescription is either a cream or ointment that does not contain sulfur.     Patient states she has tiny bites all over body and has been itching for the past few months. Patient thinks it is mites or scabies.   -------------------------------------------------------------------------------  ----- Message from [employer] sent at 7/6/2023  2:37 PM CDT -----  Contact: pt  Type: Requesting to speak with nurse  Who Called: PT  Regarding: pt has scapies and would like medication. Pt unable to seek dermatogist soon.  Would the patient rather a call back or a response via [facility]? Call back  Best Call Back Number: [phone]  Additional Information:  [facility]/pharmacy #[id number] - [address] AT [facility]   Phone: [phone]  Fax:  [phone]

## 2023-07-06 NOTE — TELEPHONE ENCOUNTER
Pt left message regarding that she had lab orders and I called  and left her a message  about why do she needs repeated labs in a few weeks I left her a message explaining to her about the labs needing a repeat to compare lab results

## 2023-07-06 NOTE — TELEPHONE ENCOUNTER
----- Message from Jessenia Roach sent at 7/6/2023 10:25 AM CDT -----  Contact: pt  Type: Call back     Who Called:pt    Does the patient know what this is regarding?:labs   Would the patient rather a call back or a response via [facility]? Call   Best Call Back Number:[phone]  Additional Information:      Pt stated she does not know what the labs are for on 07/17 and would like someone to call her and explain them to her

## 2023-07-07 NOTE — TELEPHONE ENCOUNTER
I would see MD before treatment to make sure it is scabies and not something else-I would consider going to urgent care one day over the next three days

## 2023-07-14 ENCOUNTER — TELEPHONE (OUTPATIENT)
Dept: CARDIOLOGY | Facility: CLINIC | Age: 78
End: 2023-07-14
Payer: MEDICARE

## 2023-07-17 ENCOUNTER — LAB VISIT (OUTPATIENT)
Dept: LAB | Facility: HOSPITAL | Age: 78
End: 2023-07-17
Attending: INTERNAL MEDICINE
Payer: MEDICARE

## 2023-07-17 DIAGNOSIS — I48.91 ATRIAL FIBRILLATION WITH RVR: ICD-10-CM

## 2023-07-17 DIAGNOSIS — Z79.01 LONG TERM (CURRENT) USE OF ANTICOAGULANTS: ICD-10-CM

## 2023-07-17 LAB
INR PPP: 1.9 (ref 0.8–1.2)
PROTHROMBIN TIME: 19.8 SEC (ref 9–12.5)

## 2023-07-17 PROCEDURE — 36415 COLL VENOUS BLD VENIPUNCTURE: CPT | Mod: HCNC,PO | Performed by: INTERNAL MEDICINE

## 2023-07-17 PROCEDURE — 85610 PROTHROMBIN TIME: CPT | Mod: HCNC,PO | Performed by: INTERNAL MEDICINE

## 2023-07-18 ENCOUNTER — ANTI-COAG VISIT (OUTPATIENT)
Dept: CARDIOLOGY | Facility: CLINIC | Age: 78
End: 2023-07-18
Payer: MEDICARE

## 2023-07-18 DIAGNOSIS — I48.91 ATRIAL FIBRILLATION WITH RVR: Primary | ICD-10-CM

## 2023-07-18 DIAGNOSIS — Z79.01 LONG TERM (CURRENT) USE OF ANTICOAGULANTS: ICD-10-CM

## 2023-07-18 PROCEDURE — 93793 PR ANTICOAGULANT MGMT FOR PT TAKING WARFARIN: ICD-10-PCS | Mod: S$GLB,,,

## 2023-07-18 PROCEDURE — 93793 ANTICOAG MGMT PT WARFARIN: CPT | Mod: S$GLB,,,

## 2023-07-28 ENCOUNTER — LAB VISIT (OUTPATIENT)
Dept: LAB | Facility: HOSPITAL | Age: 78
End: 2023-07-28
Attending: INTERNAL MEDICINE
Payer: MEDICARE

## 2023-07-28 DIAGNOSIS — Z79.01 LONG TERM (CURRENT) USE OF ANTICOAGULANTS: ICD-10-CM

## 2023-07-28 DIAGNOSIS — I48.91 ATRIAL FIBRILLATION WITH RVR: ICD-10-CM

## 2023-07-28 LAB
INR PPP: 2 (ref 0.8–1.2)
PROTHROMBIN TIME: 21.2 SEC (ref 9–12.5)

## 2023-07-28 PROCEDURE — 36415 COLL VENOUS BLD VENIPUNCTURE: CPT | Mod: HCNC,PO | Performed by: INTERNAL MEDICINE

## 2023-07-28 PROCEDURE — 85610 PROTHROMBIN TIME: CPT | Mod: HCNC,PO | Performed by: INTERNAL MEDICINE

## 2023-07-31 ENCOUNTER — ANTI-COAG VISIT (OUTPATIENT)
Dept: CARDIOLOGY | Facility: CLINIC | Age: 78
End: 2023-07-31
Payer: MEDICARE

## 2023-07-31 DIAGNOSIS — I48.91 ATRIAL FIBRILLATION WITH RVR: Primary | ICD-10-CM

## 2023-07-31 DIAGNOSIS — Z79.01 LONG TERM (CURRENT) USE OF ANTICOAGULANTS: ICD-10-CM

## 2023-07-31 PROCEDURE — 93793 PR ANTICOAGULANT MGMT FOR PT TAKING WARFARIN: ICD-10-PCS | Mod: S$GLB,,,

## 2023-07-31 PROCEDURE — 93793 ANTICOAG MGMT PT WARFARIN: CPT | Mod: S$GLB,,,

## 2023-07-31 NOTE — PROGRESS NOTES
INR ok. It's from 3 days ago. Pt went to lab unscheduled and late on a Friday. Please advise pt she must contact CC to r/s labs. We would not have allowed her to go to a lab that late of a Friday when no is available to address results.

## 2023-08-02 ENCOUNTER — TELEPHONE (OUTPATIENT)
Dept: FAMILY MEDICINE | Facility: CLINIC | Age: 78
End: 2023-08-02
Payer: MEDICARE

## 2023-08-02 DIAGNOSIS — R39.9 UTI SYMPTOMS: Primary | ICD-10-CM

## 2023-08-02 NOTE — TELEPHONE ENCOUNTER
----- Message from Bull Rojas sent at 8/2/2023  1:59 PM CDT -----  Type:  RX Refill Request    Who Called: pt  Refill or New Rx:new rx  RX Name and Strength:medication for a bladder infection  Preferred Pharmacy with phone number:[facility]/pharmacy #[id number] - [place] [address] AT [facility]  Local or Mail Order:local  Ordering Provider:[facility]awkins  Would the patient rather a call back or a response via [facility]? call  Best Call Back Number:[phone]  Additional Information: patient would like to be informed if medication request cannot be fulfilled

## 2023-08-02 NOTE — TELEPHONE ENCOUNTER
Called patient no answer; LVM to return call.  Need more detail information regards to bladder infection

## 2023-08-03 NOTE — TELEPHONE ENCOUNTER
Patient states she has some pain and bloating, feels like she has to urinate when she does not.     Taking AZO pills and drinking cranberry juice, states she feels better but knows she needs antibiotics

## 2023-08-08 NOTE — TELEPHONE ENCOUNTER
Jessenia Roach [facility] Staff  Caller: pt (Today, 12:17 PM)  Type:  Call back     Who Called:pt     Does the patient know what this is regarding?:urine test   Would the patient rather a call back or a response via [facility]? Call   Best Call Back Number:[phone]   Additional Information:       Pt stated she does not need the urine test anymore because the symptoms are gone     I canceled urine lab

## 2023-08-09 ENCOUNTER — LAB VISIT (OUTPATIENT)
Dept: LAB | Facility: HOSPITAL | Age: 78
End: 2023-08-09
Attending: INTERNAL MEDICINE
Payer: MEDICARE

## 2023-08-09 ENCOUNTER — ANTI-COAG VISIT (OUTPATIENT)
Dept: CARDIOLOGY | Facility: CLINIC | Age: 78
End: 2023-08-09
Payer: MEDICARE

## 2023-08-09 DIAGNOSIS — I48.91 ATRIAL FIBRILLATION WITH RVR: Primary | ICD-10-CM

## 2023-08-09 DIAGNOSIS — Z79.01 LONG TERM (CURRENT) USE OF ANTICOAGULANTS: ICD-10-CM

## 2023-08-09 DIAGNOSIS — I48.91 ATRIAL FIBRILLATION WITH RVR: ICD-10-CM

## 2023-08-09 LAB
INR PPP: 4.4 (ref 0.8–1.2)
PROTHROMBIN TIME: 43.6 SEC (ref 9–12.5)

## 2023-08-09 PROCEDURE — 93793 ANTICOAG MGMT PT WARFARIN: CPT | Mod: S$GLB,,,

## 2023-08-09 PROCEDURE — 93793 PR ANTICOAGULANT MGMT FOR PT TAKING WARFARIN: ICD-10-PCS | Mod: S$GLB,,,

## 2023-08-09 PROCEDURE — 36415 COLL VENOUS BLD VENIPUNCTURE: CPT | Mod: HCNC,PO | Performed by: INTERNAL MEDICINE

## 2023-08-09 PROCEDURE — 85610 PROTHROMBIN TIME: CPT | Mod: HCNC,PO | Performed by: INTERNAL MEDICINE

## 2023-08-10 NOTE — PROGRESS NOTES
INR high. Pt denies changes. Noted UTI sx reported to PCP and u/a done yesterday. Looks like she does have UTI, advise to contact PCP if she doesn't hear from them. Advise to call us with abx if starting anything new. Decrease dose per calendar. Recheck INR in 1 week    Update 8/14: Pt called to report new meds and bruising. INR was high last week so bruising easily expected. No problems with new meds. Advise pt that her PCP sent in new Rx for UTI yesterday, nitrofurantoin, which is also fine. Keep INR as planned this week or can move up sooner if she would like. Confirm she held and decreased dose as planned last week. Send back if not.

## 2023-08-13 DIAGNOSIS — R39.9 UTI SYMPTOMS: Primary | ICD-10-CM

## 2023-08-13 RX ORDER — NITROFURANTOIN 25; 75 MG/1; MG/1
100 CAPSULE ORAL 2 TIMES DAILY
Qty: 14 CAPSULE | Refills: 0 | Status: SHIPPED | OUTPATIENT
Start: 2023-08-13 | End: 2023-08-16

## 2023-08-14 ENCOUNTER — TELEPHONE (OUTPATIENT)
Dept: FAMILY MEDICINE | Facility: CLINIC | Age: 78
End: 2023-08-14
Payer: MEDICARE

## 2023-08-14 NOTE — TELEPHONE ENCOUNTER
----- Message from Jessenia Roach sent at 8/14/2023  2:44 PM CDT -----  Contact: pt  Type:  Patient Returning Call    Who Called:pt  Who Left Message for Patient:Amanda   Does the patient know what this is regarding?:results   Would the patient rather a call back or a response via [facility]? Call   Best Call Back Number:[phone]  Additional Information:

## 2023-08-14 NOTE — TELEPHONE ENCOUNTER
----- Message from Jessenia Roach sent at 8/14/2023  2:44 PM CDT -----  Contact: pt  Type:  Patient Returning Call     Who Called:pt  Who Left Message for Patient:Amanda   Does the patient know what this is regarding?:results   Would the patient rather a call back or a response via [facility]? Call   Best Call Back Number:[phone]  Additional Information:

## 2023-08-14 NOTE — PROGRESS NOTES
08/14/23 Called to Confirm if pt has held dose on 08.10 and decreased dose. she picked up but didn't respond after I said hello. Called her about 4 times after and she picked up the phone and hung it up each time. I will try again later

## 2023-08-14 NOTE — PROGRESS NOTES
8/14/23 Patient called to report she has 2 bruises (1 on face/neck) since a week ago and another on her chest she noticed this past weekend, also has itching (food allergies) and 2 weeks ago started Fexofenadine hcl 180 mg -1 every 6 hours, also taking dairy aide once a day, next INR is due 8/16

## 2023-08-14 NOTE — TELEPHONE ENCOUNTER
LM for pt to call back clinic    ----- Message from Anders Chanel MD sent at 8/13/2023  3:00 PM CDT -----  Urinalysis shows white blood cells in urine as well as bacteria.  Suggest taking antibiotics for week to try to clear.  Follow-up urinalysis in two weeks ordered.    Ask if continuing to follow-up with urologist at [facility]

## 2023-08-15 NOTE — TELEPHONE ENCOUNTER
Pt inform of U/A results and dr ledesma. Pt agrees and verbalize and has a appt schedule with PCP on tomorrow to further discuss concerns.

## 2023-08-16 ENCOUNTER — LAB VISIT (OUTPATIENT)
Dept: LAB | Facility: HOSPITAL | Age: 78
End: 2023-08-16
Attending: UROLOGY
Payer: MEDICARE

## 2023-08-16 ENCOUNTER — OFFICE VISIT (OUTPATIENT)
Dept: FAMILY MEDICINE | Facility: CLINIC | Age: 78
End: 2023-08-16
Payer: MEDICARE

## 2023-08-16 VITALS
TEMPERATURE: 98 F | DIASTOLIC BLOOD PRESSURE: 80 MMHG | BODY MASS INDEX: 38.93 KG/M2 | HEIGHT: 60 IN | WEIGHT: 198.31 LBS | HEART RATE: 53 BPM | SYSTOLIC BLOOD PRESSURE: 138 MMHG | OXYGEN SATURATION: 97 %

## 2023-08-16 DIAGNOSIS — N18.4 TYPE 2 DIABETES MELLITUS WITH STAGE 4 CHRONIC KIDNEY DISEASE, WITH LONG-TERM CURRENT USE OF INSULIN: Primary | ICD-10-CM

## 2023-08-16 DIAGNOSIS — I48.91 ATRIAL FIBRILLATION WITH RVR: ICD-10-CM

## 2023-08-16 DIAGNOSIS — Z23 NEED FOR PNEUMOCOCCAL VACCINATION: ICD-10-CM

## 2023-08-16 DIAGNOSIS — Z79.4 TYPE 2 DIABETES MELLITUS WITH STAGE 4 CHRONIC KIDNEY DISEASE, WITH LONG-TERM CURRENT USE OF INSULIN: ICD-10-CM

## 2023-08-16 DIAGNOSIS — E11.3593 PROLIFERATIVE DIABETIC RETINOPATHY OF BOTH EYES ASSOCIATED WITH TYPE 2 DIABETES MELLITUS, UNSPECIFIED PROLIFERATIVE RETINOPATHY TYPE: ICD-10-CM

## 2023-08-16 DIAGNOSIS — M25.559 HIP PAIN, UNSPECIFIED LATERALITY: ICD-10-CM

## 2023-08-16 DIAGNOSIS — G89.29 OTHER CHRONIC PAIN: ICD-10-CM

## 2023-08-16 DIAGNOSIS — I50.20 HEART FAILURE WITH REDUCED EJECTION FRACTION: ICD-10-CM

## 2023-08-16 DIAGNOSIS — Z79.01 LONG TERM (CURRENT) USE OF ANTICOAGULANTS: ICD-10-CM

## 2023-08-16 DIAGNOSIS — N18.4 TYPE 2 DIABETES MELLITUS WITH STAGE 4 CHRONIC KIDNEY DISEASE, WITH LONG-TERM CURRENT USE OF INSULIN: ICD-10-CM

## 2023-08-16 DIAGNOSIS — E11.22 TYPE 2 DIABETES MELLITUS WITH STAGE 4 CHRONIC KIDNEY DISEASE, WITH LONG-TERM CURRENT USE OF INSULIN: ICD-10-CM

## 2023-08-16 DIAGNOSIS — E21.3 HYPERPARATHYROIDISM: ICD-10-CM

## 2023-08-16 DIAGNOSIS — I70.0 AORTIC ATHEROSCLEROSIS: ICD-10-CM

## 2023-08-16 DIAGNOSIS — Z79.4 TYPE 2 DIABETES MELLITUS WITH STAGE 4 CHRONIC KIDNEY DISEASE, WITH LONG-TERM CURRENT USE OF INSULIN: Primary | ICD-10-CM

## 2023-08-16 DIAGNOSIS — E11.22 TYPE 2 DIABETES MELLITUS WITH STAGE 4 CHRONIC KIDNEY DISEASE, WITH LONG-TERM CURRENT USE OF INSULIN: Primary | ICD-10-CM

## 2023-08-16 DIAGNOSIS — N18.4 CKD (CHRONIC KIDNEY DISEASE), STAGE IV: ICD-10-CM

## 2023-08-16 DIAGNOSIS — Z78.0 POSTMENOPAUSAL: ICD-10-CM

## 2023-08-16 LAB
CHOLEST SERPL-MCNC: 175 MG/DL (ref 120–199)
CHOLEST/HDLC SERPL: 4.2 {RATIO} (ref 2–5)
ESTIMATED AVG GLUCOSE: 111 MG/DL (ref 68–131)
HBA1C MFR BLD: 5.5 % (ref 4–5.6)
HDLC SERPL-MCNC: 42 MG/DL (ref 40–75)
HDLC SERPL: 24 % (ref 20–50)
INR PPP: 2 (ref 0.8–1.2)
LDLC SERPL CALC-MCNC: 104.6 MG/DL (ref 63–159)
NONHDLC SERPL-MCNC: 133 MG/DL
PROTHROMBIN TIME: 20.7 SEC (ref 9–12.5)
TRIGL SERPL-MCNC: 142 MG/DL (ref 30–150)

## 2023-08-16 PROCEDURE — 1101F PT FALLS ASSESS-DOCD LE1/YR: CPT | Mod: CPTII,S$GLB,, | Performed by: FAMILY MEDICINE

## 2023-08-16 PROCEDURE — 3288F FALL RISK ASSESSMENT DOCD: CPT | Mod: CPTII,S$GLB,, | Performed by: FAMILY MEDICINE

## 2023-08-16 PROCEDURE — 3075F PR MOST RECENT SYSTOLIC BLOOD PRESS GE 130-139MM HG: ICD-10-PCS | Mod: CPTII,S$GLB,, | Performed by: FAMILY MEDICINE

## 2023-08-16 PROCEDURE — 99215 PR OFFICE/OUTPT VISIT, EST, LEVL V, 40-54 MIN: ICD-10-PCS | Mod: S$GLB,,, | Performed by: FAMILY MEDICINE

## 2023-08-16 PROCEDURE — 1126F AMNT PAIN NOTED NONE PRSNT: CPT | Mod: CPTII,S$GLB,, | Performed by: FAMILY MEDICINE

## 2023-08-16 PROCEDURE — 1160F PR REVIEW ALL MEDS BY PRESCRIBER/CLIN PHARMACIST DOCUMENTED: ICD-10-PCS | Mod: CPTII,S$GLB,, | Performed by: FAMILY MEDICINE

## 2023-08-16 PROCEDURE — 3075F SYST BP GE 130 - 139MM HG: CPT | Mod: CPTII,S$GLB,, | Performed by: FAMILY MEDICINE

## 2023-08-16 PROCEDURE — 80061 LIPID PANEL: CPT | Performed by: FAMILY MEDICINE

## 2023-08-16 PROCEDURE — 36415 COLL VENOUS BLD VENIPUNCTURE: CPT | Mod: PO | Performed by: INTERNAL MEDICINE

## 2023-08-16 PROCEDURE — 1101F PR PT FALLS ASSESS DOC 0-1 FALLS W/OUT INJ PAST YR: ICD-10-PCS | Mod: CPTII,S$GLB,, | Performed by: FAMILY MEDICINE

## 2023-08-16 PROCEDURE — 99215 OFFICE O/P EST HI 40 MIN: CPT | Mod: S$GLB,,, | Performed by: FAMILY MEDICINE

## 2023-08-16 PROCEDURE — 3288F PR FALLS RISK ASSESSMENT DOCUMENTED: ICD-10-PCS | Mod: CPTII,S$GLB,, | Performed by: FAMILY MEDICINE

## 2023-08-16 PROCEDURE — 1126F PR PAIN SEVERITY QUANTIFIED, NO PAIN PRESENT: ICD-10-PCS | Mod: CPTII,S$GLB,, | Performed by: FAMILY MEDICINE

## 2023-08-16 PROCEDURE — 1159F MED LIST DOCD IN RCRD: CPT | Mod: CPTII,S$GLB,, | Performed by: FAMILY MEDICINE

## 2023-08-16 PROCEDURE — 85610 PROTHROMBIN TIME: CPT | Mod: PO | Performed by: INTERNAL MEDICINE

## 2023-08-16 PROCEDURE — 3079F PR MOST RECENT DIASTOLIC BLOOD PRESSURE 80-89 MM HG: ICD-10-PCS | Mod: CPTII,S$GLB,, | Performed by: FAMILY MEDICINE

## 2023-08-16 PROCEDURE — 1160F RVW MEDS BY RX/DR IN RCRD: CPT | Mod: CPTII,S$GLB,, | Performed by: FAMILY MEDICINE

## 2023-08-16 PROCEDURE — 83036 HEMOGLOBIN GLYCOSYLATED A1C: CPT | Performed by: FAMILY MEDICINE

## 2023-08-16 PROCEDURE — 1159F PR MEDICATION LIST DOCUMENTED IN MEDICAL RECORD: ICD-10-PCS | Mod: CPTII,S$GLB,, | Performed by: FAMILY MEDICINE

## 2023-08-16 PROCEDURE — 3079F DIAST BP 80-89 MM HG: CPT | Mod: CPTII,S$GLB,, | Performed by: FAMILY MEDICINE

## 2023-08-16 NOTE — PROGRESS NOTES
Patient ID: Yajaira Terry is a 78 y.o. female.    Chief Complaint: Follow-up and Diabetes    HPI      Yajaira Terry is a 78 y.o. female here following up on diabetes mellitus.  Blood glucose levels have been stable.  No additional complaints at this time.    Continues to have problems with muscle skeletal pain  Patient with atrial fibrillation not having tachycardia      Vitals:    08/16/23 1438   BP: 138/80   BP Location: Left arm   Patient Position: Sitting   Pulse: (!) 53   Temp: 98.1 °F (36.7 °C)   TempSrc: Oral   SpO2: 97%   Weight: 89.9 kg (198 lb 4.9 oz)   Height: 5' (1.524 m)            Review of Symptoms      Physical Exam    Constitutional:  Oriented to person, place, and time.appears well-developed and well-nourished.  No distress.      HENT  Head: Normocephalic and atraumatic  Right Ear: External ear normal.   Left Ear: External ear normal.   Nose: External nose normal.   Mouth:  Moist mucus membranes.    Eyes:  Conjunctivae are normal. Right eye exhibits no discharge.  Left eye exhibits no discharge. No scleral icterus.  No periorbital edema    Cardiovascular:  Irregular    Pulmonary/Chest:   Clear to auscultation bilaterally without wheezes, rhonchi or rales      Musculoskeletal:  No edema. No obvious deformity No wasting       Neurological:  Alert and oriented to person, place, and time.   Coordination normal.     Skin:   Skin is warm and dry.  No diaphoresis.   No rash noted.     Psychiatric: Normal mood and affect. Behavior is normal.  Judgment and thought content normal.     Complete Blood Count  Lab Results   Component Value Date    RBC 3.71 (L) 07/17/2023    HGB 12.1 07/17/2023    HCT 37.9 07/17/2023     (H) 07/17/2023    MCH 32.6 (H) 07/17/2023    MCHC 31.9 (L) 07/17/2023    RDW 12.6 07/17/2023     07/17/2023    MPV 9.8 07/17/2023    GRAN 4.8 07/17/2023    GRAN 68.5 07/17/2023    LYMPH 1.5 07/17/2023    LYMPH 21.6 07/17/2023    MONO 0.4 07/17/2023    MONO 6.2 07/17/2023     "EOS 0.2 07/17/2023    BASO 0.05 07/17/2023    EOSINOPHIL 2.9 07/17/2023    BASOPHIL 0.7 07/17/2023    DIFFMETHOD Automated 07/17/2023       Comprehensive Metabolic Panel  Lab Results   Component Value Date     (H) 07/17/2023    BUN 15 07/17/2023    CREATININE 1.26 07/17/2023     07/17/2023    K 4.4 07/17/2023     07/17/2023    PROT 6.8 07/17/2023    ALBUMIN 3.7 07/17/2023    BILITOT 0.4 07/17/2023    AST 57 (H) 07/17/2023    ALKPHOS 111 07/17/2023    CO2 30 (H) 07/17/2023    ALT 29 07/17/2023    ANIONGAP 7 (L) 07/17/2023       TSH  No results found for: "TSH"    Assessment / Plan:      ICD-10-CM ICD-9-CM   1. Type 2 diabetes mellitus with stage 4 chronic kidney disease, with long-term current use of insulin  E11.22 250.40    N18.4 585.4    Z79.4 V58.67   2. Postmenopausal  Z78.0 V49.81   3. Need for pneumococcal vaccination  Z23 V03.82   4. Hyperparathyroidism  E21.3 252.00   5. Aortic atherosclerosis  I70.0 440.0   6. Heart failure with reduced ejection fraction  I50.20 428.20   7. 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 Atrial fibrillation with RVR  I48.91 427.31   9. CKD (chronic kidney disease), stage IV  N18.4 585.4   10. Hip pain, unspecified laterality  M25.559 719.45   11. 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 Still cancer free

## 2023-08-17 ENCOUNTER — ANTI-COAG VISIT (OUTPATIENT)
Dept: CARDIOLOGY | Facility: CLINIC | Age: 78
End: 2023-08-17
Payer: MEDICARE

## 2023-08-17 DIAGNOSIS — I48.91 ATRIAL FIBRILLATION WITH RVR: Primary | ICD-10-CM

## 2023-08-17 DIAGNOSIS — Z79.01 LONG TERM (CURRENT) USE OF ANTICOAGULANTS: ICD-10-CM

## 2023-08-17 PROCEDURE — 93793 PR ANTICOAGULANT MGMT FOR PT TAKING WARFARIN: ICD-10-PCS | Mod: S$GLB,,,

## 2023-08-17 PROCEDURE — 93793 ANTICOAG MGMT PT WARFARIN: CPT | Mod: S$GLB,,,

## 2023-08-22 ENCOUNTER — LAB VISIT (OUTPATIENT)
Dept: LAB | Facility: HOSPITAL | Age: 78
End: 2023-08-22
Attending: INTERNAL MEDICINE
Payer: MEDICARE

## 2023-08-22 DIAGNOSIS — L29.9 PRURITUS OF SKIN: Primary | ICD-10-CM

## 2023-08-22 LAB
ALBUMIN SERPL BCP-MCNC: 3.6 G/DL (ref 3.5–5.2)
ALP SERPL-CCNC: 104 U/L (ref 38–126)
ALT SERPL W/O P-5'-P-CCNC: 25 U/L (ref 10–44)
ANION GAP SERPL CALC-SCNC: 7 MMOL/L (ref 8–16)
AST SERPL-CCNC: 39 U/L (ref 15–46)
BASOPHILS # BLD AUTO: 0.03 K/UL (ref 0–0.2)
BASOPHILS NFR BLD: 0.5 % (ref 0–1.9)
BILIRUB SERPL-MCNC: 0.4 MG/DL (ref 0.1–1)
CALCIUM SERPL-MCNC: 8.4 MG/DL (ref 8.7–10.5)
CHLORIDE SERPL-SCNC: 106 MMOL/L (ref 95–110)
CO2 SERPL-SCNC: 30 MMOL/L (ref 23–29)
CREAT SERPL-MCNC: 1.37 MG/DL (ref 0.5–1.4)
DIFFERENTIAL METHOD: ABNORMAL
EOSINOPHIL # BLD AUTO: 0.3 K/UL (ref 0–0.5)
EOSINOPHIL NFR BLD: 5.3 % (ref 0–8)
ERYTHROCYTE [DISTWIDTH] IN BLOOD BY AUTOMATED COUNT: 12.5 % (ref 11.5–14.5)
EST. GFR  (NO RACE VARIABLE): 39.3 ML/MIN/1.73 M^2
GLUCOSE SERPL-MCNC: 69 MG/DL (ref 70–110)
HCT VFR BLD AUTO: 36 % (ref 37–48.5)
HGB BLD-MCNC: 11.9 G/DL (ref 12–16)
IMM GRANULOCYTES # BLD AUTO: 0.01 K/UL (ref 0–0.04)
IMM GRANULOCYTES NFR BLD AUTO: 0.2 % (ref 0–0.5)
LYMPHOCYTES # BLD AUTO: 1.7 K/UL (ref 1–4.8)
LYMPHOCYTES NFR BLD: 27.4 % (ref 18–48)
MCH RBC QN AUTO: 32.6 PG (ref 27–31)
MCHC RBC AUTO-ENTMCNC: 33.1 G/DL (ref 32–36)
MCV RBC AUTO: 99 FL (ref 82–98)
MONOCYTES # BLD AUTO: 0.4 K/UL (ref 0.3–1)
MONOCYTES NFR BLD: 7.1 % (ref 4–15)
NEUTROPHILS # BLD AUTO: 3.7 K/UL (ref 1.8–7.7)
NEUTROPHILS NFR BLD: 59.5 % (ref 38–73)
NRBC BLD-RTO: 0 /100 WBC
PLATELET # BLD AUTO: 198 K/UL (ref 150–450)
PMV BLD AUTO: 9.6 FL (ref 9.2–12.9)
POTASSIUM SERPL-SCNC: 3.5 MMOL/L (ref 3.5–5.1)
PROT SERPL-MCNC: 6.6 G/DL (ref 6–8.4)
RBC # BLD AUTO: 3.65 M/UL (ref 4–5.4)
SODIUM SERPL-SCNC: 143 MMOL/L (ref 136–145)
UUN UR-MCNC: 15 MG/DL (ref 7–17)
WBC # BLD AUTO: 6.21 K/UL (ref 3.9–12.7)

## 2023-08-22 PROCEDURE — 36415 COLL VENOUS BLD VENIPUNCTURE: CPT | Mod: PO | Performed by: INTERNAL MEDICINE

## 2023-08-22 PROCEDURE — 80053 COMPREHEN METABOLIC PANEL: CPT | Mod: PO | Performed by: INTERNAL MEDICINE

## 2023-08-22 PROCEDURE — 87389 HIV-1 AG W/HIV-1&-2 AB AG IA: CPT | Mod: PO | Performed by: INTERNAL MEDICINE

## 2023-08-22 PROCEDURE — 85025 COMPLETE CBC W/AUTO DIFF WBC: CPT | Mod: PO | Performed by: INTERNAL MEDICINE

## 2023-08-22 PROCEDURE — 84443 ASSAY THYROID STIM HORMONE: CPT | Mod: PO | Performed by: INTERNAL MEDICINE

## 2023-08-23 LAB
HIV 1+2 AB+HIV1 P24 AG SERPL QL IA: NORMAL
TSH SERPL DL<=0.005 MIU/L-ACNC: 0.61 UIU/ML (ref 0.4–4)

## 2023-08-30 ENCOUNTER — LAB VISIT (OUTPATIENT)
Dept: LAB | Facility: HOSPITAL | Age: 78
End: 2023-08-30
Attending: UROLOGY
Payer: MEDICARE

## 2023-08-30 ENCOUNTER — ANTI-COAG VISIT (OUTPATIENT)
Dept: CARDIOLOGY | Facility: CLINIC | Age: 78
End: 2023-08-30
Payer: MEDICARE

## 2023-08-30 DIAGNOSIS — Z79.01 LONG TERM (CURRENT) USE OF ANTICOAGULANTS: ICD-10-CM

## 2023-08-30 DIAGNOSIS — I48.91 ATRIAL FIBRILLATION WITH RVR: Primary | ICD-10-CM

## 2023-08-30 DIAGNOSIS — I48.91 ATRIAL FIBRILLATION WITH RVR: ICD-10-CM

## 2023-08-30 LAB
INR PPP: 4.1 (ref 0.8–1.2)
PROTHROMBIN TIME: 40.7 SEC (ref 9–12.5)

## 2023-08-30 PROCEDURE — 93793 PR ANTICOAGULANT MGMT FOR PT TAKING WARFARIN: ICD-10-PCS | Mod: S$GLB,,,

## 2023-08-30 PROCEDURE — 36415 COLL VENOUS BLD VENIPUNCTURE: CPT | Mod: PO | Performed by: INTERNAL MEDICINE

## 2023-08-30 PROCEDURE — 93793 ANTICOAG MGMT PT WARFARIN: CPT | Mod: S$GLB,,,

## 2023-08-30 PROCEDURE — 85610 PROTHROMBIN TIME: CPT | Mod: PO | Performed by: INTERNAL MEDICINE

## 2023-08-30 NOTE — PROGRESS NOTES
INR high. Pt reports taking famotidine and levocetirizine for itching. Also c/o diarrhea 8/27. She c/o bruising. No other changes. Hold dose today & reevaluate next week.

## 2023-09-06 ENCOUNTER — LAB VISIT (OUTPATIENT)
Dept: LAB | Facility: HOSPITAL | Age: 78
End: 2023-09-06
Attending: INTERNAL MEDICINE
Payer: MEDICARE

## 2023-09-06 DIAGNOSIS — Z79.01 LONG TERM (CURRENT) USE OF ANTICOAGULANTS: ICD-10-CM

## 2023-09-06 DIAGNOSIS — I48.91 ATRIAL FIBRILLATION WITH RVR: ICD-10-CM

## 2023-09-06 LAB
INR PPP: 4.3 (ref 0.8–1.2)
PROTHROMBIN TIME: 42.5 SEC (ref 9–12.5)

## 2023-09-06 PROCEDURE — 85610 PROTHROMBIN TIME: CPT | Mod: PO | Performed by: INTERNAL MEDICINE

## 2023-09-06 PROCEDURE — 36415 COLL VENOUS BLD VENIPUNCTURE: CPT | Mod: PO | Performed by: INTERNAL MEDICINE

## 2023-09-07 ENCOUNTER — ANTI-COAG VISIT (OUTPATIENT)
Dept: CARDIOLOGY | Facility: CLINIC | Age: 78
End: 2023-09-07
Payer: MEDICARE

## 2023-09-07 DIAGNOSIS — I48.91 ATRIAL FIBRILLATION WITH RVR: Primary | ICD-10-CM

## 2023-09-07 DIAGNOSIS — Z79.01 LONG TERM (CURRENT) USE OF ANTICOAGULANTS: ICD-10-CM

## 2023-09-07 PROCEDURE — 93793 ANTICOAG MGMT PT WARFARIN: CPT | Mod: S$GLB,,,

## 2023-09-07 PROCEDURE — 93793 PR ANTICOAGULANT MGMT FOR PT TAKING WARFARIN: ICD-10-PCS | Mod: S$GLB,,,

## 2023-09-07 NOTE — PROGRESS NOTES
INR not at goal. Medications, chart, and patient findings reviewed. See calendar for adjustments to dose and follow up plan.    Pt claims she was not told what she can and cannot eat/drink. Please refresh pt on diet.

## 2023-09-13 ENCOUNTER — ANTI-COAG VISIT (OUTPATIENT)
Dept: CARDIOLOGY | Facility: CLINIC | Age: 78
End: 2023-09-13
Payer: MEDICARE

## 2023-09-13 ENCOUNTER — LAB VISIT (OUTPATIENT)
Dept: LAB | Facility: HOSPITAL | Age: 78
End: 2023-09-13
Attending: INTERNAL MEDICINE
Payer: MEDICARE

## 2023-09-13 DIAGNOSIS — Z79.01 LONG TERM (CURRENT) USE OF ANTICOAGULANTS: ICD-10-CM

## 2023-09-13 DIAGNOSIS — I48.91 ATRIAL FIBRILLATION WITH RVR: ICD-10-CM

## 2023-09-13 DIAGNOSIS — I48.91 ATRIAL FIBRILLATION WITH RVR: Primary | ICD-10-CM

## 2023-09-13 LAB
INR PPP: 3.6 (ref 0.8–1.2)
PROTHROMBIN TIME: 35.6 SEC (ref 9–12.5)

## 2023-09-13 PROCEDURE — 93793 ANTICOAG MGMT PT WARFARIN: CPT | Mod: S$GLB,,,

## 2023-09-13 PROCEDURE — 36415 COLL VENOUS BLD VENIPUNCTURE: CPT | Mod: PO | Performed by: INTERNAL MEDICINE

## 2023-09-13 PROCEDURE — 85610 PROTHROMBIN TIME: CPT | Mod: PO | Performed by: INTERNAL MEDICINE

## 2023-09-13 PROCEDURE — 93793 PR ANTICOAGULANT MGMT FOR PT TAKING WARFARIN: ICD-10-PCS | Mod: S$GLB,,,

## 2023-09-14 NOTE — PROGRESS NOTES
INR still high but has improved. Pt expressed concern to MA about her dosing. Her dose was held and lowered in response to recent high INRs. Contacted pt to discuss dosing plan and what's going on with her. She has been sick with pruritis and weak. She complains of bruising. She is frustrated that her INRs are still high and thinks we need to lower dose more. I have adjusted her dose further. We will reevaluate next week.

## 2023-09-14 NOTE — PROGRESS NOTES
09/14/23 Spoke with pt regarding INR. Pt was very upset that her INR is high and blamed the CC. Pt did not let me ask her any questions. Routed chart to pharmD.

## 2023-09-18 ENCOUNTER — LAB VISIT (OUTPATIENT)
Dept: LAB | Facility: HOSPITAL | Age: 78
End: 2023-09-18
Attending: INTERNAL MEDICINE
Payer: MEDICARE

## 2023-09-18 DIAGNOSIS — I48.91 ATRIAL FIBRILLATION WITH RVR: ICD-10-CM

## 2023-09-18 DIAGNOSIS — Z79.01 LONG TERM (CURRENT) USE OF ANTICOAGULANTS: ICD-10-CM

## 2023-09-18 LAB
INR PPP: 3.5 (ref 0.8–1.2)
PROTHROMBIN TIME: 35.2 SEC (ref 9–12.5)

## 2023-09-18 PROCEDURE — 85610 PROTHROMBIN TIME: CPT | Mod: PO | Performed by: INTERNAL MEDICINE

## 2023-09-18 PROCEDURE — 36415 COLL VENOUS BLD VENIPUNCTURE: CPT | Mod: PO | Performed by: INTERNAL MEDICINE

## 2023-09-18 NOTE — PROGRESS NOTES
9/18/23  Patient called to report this am she had a nose bleed lasting 2-3 minutes, had diarrhea yesterday 9/17 and took pepto bismol, she verified correct dose, thinks she's taking too much warfarin,

## 2023-09-18 NOTE — PROGRESS NOTES
Pt returned call and I asked pt if she was able to get INR rechecked today or tomorrow. Pt denied multiple times being able to go sooner for INR states she will be there on Wednesday and will not be taking any warfarin tonight due to the nose bleed.

## 2023-09-19 ENCOUNTER — ANTI-COAG VISIT (OUTPATIENT)
Dept: CARDIOLOGY | Facility: CLINIC | Age: 78
End: 2023-09-19
Payer: MEDICARE

## 2023-09-19 DIAGNOSIS — Z79.01 LONG TERM (CURRENT) USE OF ANTICOAGULANTS: ICD-10-CM

## 2023-09-19 DIAGNOSIS — I48.91 ATRIAL FIBRILLATION WITH RVR: Primary | ICD-10-CM

## 2023-09-19 PROCEDURE — 93793 ANTICOAG MGMT PT WARFARIN: CPT | Mod: S$GLB,,,

## 2023-09-19 PROCEDURE — 93793 PR ANTICOAGULANT MGMT FOR PT TAKING WARFARIN: ICD-10-PCS | Mod: S$GLB,,,

## 2023-09-19 NOTE — PROGRESS NOTES
INR still high despite holding and lowering dose last week. Pt c/o nosebleed which has resolved. She also c/o diarrhea and took pepto bismol. Medications, chart, and patient findings reviewed. See calendar for adjustments to dose and follow up plan.    Please advise pt to avoid pepto bismol. She can use immodium as needed for diarrhea.

## 2023-09-19 NOTE — PROGRESS NOTES
Patient called was given lab result, verified correct coumadin dose, reports no changes, Patient was given coumadin instructions and next lab date, verbalized understanding but refused lab 9/25 and rescheduled to 9/27

## 2023-09-27 ENCOUNTER — LAB VISIT (OUTPATIENT)
Dept: LAB | Facility: HOSPITAL | Age: 78
End: 2023-09-27
Attending: INTERNAL MEDICINE
Payer: MEDICARE

## 2023-09-27 ENCOUNTER — ANTI-COAG VISIT (OUTPATIENT)
Dept: CARDIOLOGY | Facility: CLINIC | Age: 78
End: 2023-09-27
Payer: MEDICARE

## 2023-09-27 DIAGNOSIS — Z79.01 LONG TERM (CURRENT) USE OF ANTICOAGULANTS: ICD-10-CM

## 2023-09-27 DIAGNOSIS — I48.91 ATRIAL FIBRILLATION WITH RVR: Primary | ICD-10-CM

## 2023-09-27 DIAGNOSIS — I48.91 ATRIAL FIBRILLATION WITH RVR: ICD-10-CM

## 2023-09-27 LAB
INR PPP: 2 (ref 0.8–1.2)
PROTHROMBIN TIME: 21.2 SEC (ref 9–12.5)

## 2023-09-27 PROCEDURE — 93793 ANTICOAG MGMT PT WARFARIN: CPT | Mod: S$GLB,,,

## 2023-09-27 PROCEDURE — 93793 PR ANTICOAGULANT MGMT FOR PT TAKING WARFARIN: ICD-10-PCS | Mod: S$GLB,,,

## 2023-09-27 PROCEDURE — 85610 PROTHROMBIN TIME: CPT | Mod: PN | Performed by: INTERNAL MEDICINE

## 2023-09-27 PROCEDURE — 36415 COLL VENOUS BLD VENIPUNCTURE: CPT | Mod: PN | Performed by: INTERNAL MEDICINE

## 2023-09-28 RX ORDER — TRAMADOL HYDROCHLORIDE 50 MG/1
TABLET ORAL
Qty: 60 TABLET | Refills: 2 | Status: SHIPPED | OUTPATIENT
Start: 2023-09-28 | End: 2024-01-11

## 2023-09-28 NOTE — TELEPHONE ENCOUNTER
No care due was identified.  [facility] Embedded Care Due Messages. Reference number: [id number].   9/28/2023 7:16:05 AM CDT

## 2023-09-28 NOTE — PROGRESS NOTES
INR ok. Its on low end on current dose and this dose is very low for her. But recently INR has remained high. Continue on current dose for now. Recheck INR in 1 week

## 2023-10-06 ENCOUNTER — LAB VISIT (OUTPATIENT)
Dept: LAB | Facility: HOSPITAL | Age: 78
End: 2023-10-06
Attending: INTERNAL MEDICINE
Payer: MEDICARE

## 2023-10-06 ENCOUNTER — ANTI-COAG VISIT (OUTPATIENT)
Dept: CARDIOLOGY | Facility: CLINIC | Age: 78
End: 2023-10-06
Payer: MEDICARE

## 2023-10-06 DIAGNOSIS — I48.91 ATRIAL FIBRILLATION WITH RVR: Primary | ICD-10-CM

## 2023-10-06 DIAGNOSIS — I48.91 ATRIAL FIBRILLATION WITH RVR: ICD-10-CM

## 2023-10-06 DIAGNOSIS — Z79.01 LONG TERM (CURRENT) USE OF ANTICOAGULANTS: ICD-10-CM

## 2023-10-06 LAB
INR PPP: 1.6 (ref 0.8–1.2)
PROTHROMBIN TIME: 17 SEC (ref 9–12.5)

## 2023-10-06 PROCEDURE — 93793 ANTICOAG MGMT PT WARFARIN: CPT | Mod: S$GLB,,,

## 2023-10-06 PROCEDURE — 36415 COLL VENOUS BLD VENIPUNCTURE: CPT | Mod: PN | Performed by: INTERNAL MEDICINE

## 2023-10-06 PROCEDURE — 93793 PR ANTICOAGULANT MGMT FOR PT TAKING WARFARIN: ICD-10-PCS | Mod: S$GLB,,,

## 2023-10-06 PROCEDURE — 85610 PROTHROMBIN TIME: CPT | Mod: PN | Performed by: INTERNAL MEDICINE

## 2023-10-06 NOTE — PROGRESS NOTES
INR not at goal. Pt missed a dose. Medications, chart, and patient findings reviewed. See calendar for adjustments to dose and follow up plan.

## 2023-10-09 ENCOUNTER — TELEPHONE (OUTPATIENT)
Dept: FAMILY MEDICINE | Facility: CLINIC | Age: 78
End: 2023-10-09
Payer: MEDICARE

## 2023-10-09 NOTE — TELEPHONE ENCOUNTER
We need to schedule this appt - it looks like I scheduled the pt appt but at the wrong location  I LM for th ept to call me back to discuss further    ----- Message from Tasha Perry sent at 10/9/2023  3:37 PM CDT -----  Type:  Letter of Authorization    Who Called:pt  Would the patient rather a call back or a response via [facility]? call  Best Call Back Number:[phone]    Additional Information: Send [facility] a letter of authorization her member id#[id number] for her to get a blood density test.        Pt Advice  Pritesh Math[facility] Staff  Caller: PT (Today,  3:30 PM)  Type: Requesting to speak with nurse   Who Called: PT   Regarding: AUTHORIZATION NEEDED FOR BONE DENSITY. PLEASE CONTACT PATIENT'S INSURANCE.   Would the patient rather a call back or a response via [facility]? Call back   Best Call Back Number: [phone]   Additional Information:

## 2023-10-10 ENCOUNTER — TELEPHONE (OUTPATIENT)
Dept: FAMILY MEDICINE | Facility: CLINIC | Age: 78
End: 2023-10-10
Payer: MEDICARE

## 2023-10-10 NOTE — TELEPHONE ENCOUNTER
----- Message from Krista Min sent at 10/10/2023  9:14 AM CDT -----  Type: Appt. Questions      Who Called: Pt   Would the patient rather a call back or a response via [facility]? Call back   Best Call Back Number: [phone]  Additional Information: Please be advised, pt said she spoke w/ Alanna regarding her switching insurance to [facility] and has some questions regarding appts.

## 2023-10-19 ENCOUNTER — ANTI-COAG VISIT (OUTPATIENT)
Dept: CARDIOLOGY | Facility: CLINIC | Age: 78
End: 2023-10-19
Payer: MEDICARE

## 2023-10-19 ENCOUNTER — LAB VISIT (OUTPATIENT)
Dept: LAB | Facility: HOSPITAL | Age: 78
End: 2023-10-19
Attending: INTERNAL MEDICINE
Payer: MEDICARE

## 2023-10-19 DIAGNOSIS — I48.91 ATRIAL FIBRILLATION WITH RVR: ICD-10-CM

## 2023-10-19 DIAGNOSIS — Z79.01 LONG TERM (CURRENT) USE OF ANTICOAGULANTS: ICD-10-CM

## 2023-10-19 DIAGNOSIS — I48.91 ATRIAL FIBRILLATION WITH RVR: Primary | ICD-10-CM

## 2023-10-19 LAB
INR PPP: 1.7 (ref 0.8–1.2)
PROTHROMBIN TIME: 17.4 SEC (ref 9–12.5)

## 2023-10-19 PROCEDURE — 93793 PR ANTICOAGULANT MGMT FOR PT TAKING WARFARIN: ICD-10-PCS | Mod: S$GLB,,,

## 2023-10-19 PROCEDURE — 85610 PROTHROMBIN TIME: CPT | Mod: PN | Performed by: INTERNAL MEDICINE

## 2023-10-19 PROCEDURE — 93793 ANTICOAG MGMT PT WARFARIN: CPT | Mod: S$GLB,,,

## 2023-10-19 PROCEDURE — 36415 COLL VENOUS BLD VENIPUNCTURE: CPT | Mod: PN | Performed by: INTERNAL MEDICINE

## 2023-11-01 ENCOUNTER — ANTI-COAG VISIT (OUTPATIENT)
Dept: CARDIOLOGY | Facility: CLINIC | Age: 78
End: 2023-11-01
Payer: MEDICARE

## 2023-11-01 ENCOUNTER — LAB VISIT (OUTPATIENT)
Dept: LAB | Facility: HOSPITAL | Age: 78
End: 2023-11-01
Attending: INTERNAL MEDICINE
Payer: MEDICARE

## 2023-11-01 DIAGNOSIS — Z79.01 LONG TERM (CURRENT) USE OF ANTICOAGULANTS: ICD-10-CM

## 2023-11-01 DIAGNOSIS — I48.91 ATRIAL FIBRILLATION WITH RVR: Primary | ICD-10-CM

## 2023-11-01 DIAGNOSIS — I48.91 ATRIAL FIBRILLATION WITH RVR: ICD-10-CM

## 2023-11-01 LAB
INR PPP: 3.9 (ref 0.8–1.2)
PROTHROMBIN TIME: 38.8 SEC (ref 9–12.5)

## 2023-11-01 PROCEDURE — 85610 PROTHROMBIN TIME: CPT | Mod: PN | Performed by: INTERNAL MEDICINE

## 2023-11-01 PROCEDURE — 93793 ANTICOAG MGMT PT WARFARIN: CPT | Mod: S$GLB,,,

## 2023-11-01 PROCEDURE — 36415 COLL VENOUS BLD VENIPUNCTURE: CPT | Mod: PN | Performed by: INTERNAL MEDICINE

## 2023-11-01 PROCEDURE — 93793 PR ANTICOAGULANT MGMT FOR PT TAKING WARFARIN: ICD-10-PCS | Mod: S$GLB,,,

## 2023-11-02 ENCOUNTER — ANTI-COAG VISIT (OUTPATIENT)
Dept: CARDIOLOGY | Facility: CLINIC | Age: 78
End: 2023-11-02
Payer: MEDICARE

## 2023-11-02 DIAGNOSIS — I48.91 ATRIAL FIBRILLATION WITH RVR: Primary | ICD-10-CM

## 2023-11-02 DIAGNOSIS — Z79.01 LONG TERM (CURRENT) USE OF ANTICOAGULANTS: ICD-10-CM

## 2023-11-02 NOTE — PROGRESS NOTES
INR high. Pt reports incorrect, lower dose actually. She reports swelling and increased bruising. Decrease dose per calendar

## 2023-11-02 NOTE — PROGRESS NOTES
Pt reports she was telling us the doses a week before when she spoke to someone in the [facility]. She states she took 1.5mg  everyday except for Fri and Mon 3mg. She state she made a mistake and reported the wrong doses. Would like doses sent to [facility] so she could refer back to it and write them down. No other changes.

## 2023-11-02 NOTE — PROGRESS NOTES
Opened in error. Please disregard. Encounter already opened to address INR from 11/1. See 11/1 encounter.

## 2023-11-10 ENCOUNTER — TELEPHONE (OUTPATIENT)
Dept: PULMONOLOGY | Facility: CLINIC | Age: 78
End: 2023-11-10
Payer: MEDICARE

## 2023-11-10 NOTE — TELEPHONE ENCOUNTER
Reached back out to Mrs. Terry,    No answer, Left her V/Message letting her know That      schedule is full until  late Febraury,Early March.    Patient was last seen by .    I also advised patient that she can get a sooner     appointment with the Cardiologist at [facility].        Nw                                          ----- Message from Precious Ferrera sent at 11/9/2023  4:44 PM CST -----  Type:  Needs Medical Advice    Who Called: pt  Symptoms (please be specific): pt needs to get seen  as soon as possible it has been 9 months since she has seen seymour johnston much longer a cardiologist had AFIB   Would the patient rather a call back or a response via [facility]? call  Best Call Back Number: [phone]   Additional Information:

## 2023-11-13 ENCOUNTER — TELEPHONE (OUTPATIENT)
Dept: CARDIOLOGY | Facility: CLINIC | Age: 78
End: 2023-11-13
Payer: MEDICARE

## 2023-11-13 NOTE — TELEPHONE ENCOUNTER
2 nd attempt to reach in regard to arranging an appt with Dr Richards in the [place] location.    Detailed VM left with direct line to my office for return call

## 2023-11-13 NOTE — TELEPHONE ENCOUNTER
Reached out to and arranged appt with Dr Richards in [facility] clinic on 11/20 at 1040    Voice appreciation for call

## 2023-11-16 ENCOUNTER — ANTI-COAG VISIT (OUTPATIENT)
Dept: CARDIOLOGY | Facility: CLINIC | Age: 78
End: 2023-11-16
Payer: MEDICARE

## 2023-11-16 ENCOUNTER — LAB VISIT (OUTPATIENT)
Dept: LAB | Facility: HOSPITAL | Age: 78
End: 2023-11-16
Attending: INTERNAL MEDICINE
Payer: MEDICARE

## 2023-11-16 DIAGNOSIS — I48.91 ATRIAL FIBRILLATION WITH RVR: ICD-10-CM

## 2023-11-16 DIAGNOSIS — I48.91 ATRIAL FIBRILLATION WITH RVR: Primary | ICD-10-CM

## 2023-11-16 DIAGNOSIS — Z79.01 LONG TERM (CURRENT) USE OF ANTICOAGULANTS: ICD-10-CM

## 2023-11-16 LAB
INR PPP: 1.6 (ref 0.8–1.2)
PROTHROMBIN TIME: 16.5 SEC (ref 9–12.5)

## 2023-11-16 PROCEDURE — 93793 ANTICOAG MGMT PT WARFARIN: CPT | Mod: S$GLB,,,

## 2023-11-16 PROCEDURE — 93793 PR ANTICOAGULANT MGMT FOR PT TAKING WARFARIN: ICD-10-PCS | Mod: S$GLB,,,

## 2023-11-16 PROCEDURE — 85610 PROTHROMBIN TIME: CPT | Mod: PN | Performed by: INTERNAL MEDICINE

## 2023-11-16 PROCEDURE — 36415 COLL VENOUS BLD VENIPUNCTURE: CPT | Mod: PN | Performed by: INTERNAL MEDICINE

## 2023-11-28 ENCOUNTER — ANTI-COAG VISIT (OUTPATIENT)
Dept: CARDIOLOGY | Facility: CLINIC | Age: 78
End: 2023-11-28
Payer: MEDICARE

## 2023-11-28 ENCOUNTER — LAB VISIT (OUTPATIENT)
Dept: LAB | Facility: HOSPITAL | Age: 78
End: 2023-11-28
Attending: INTERNAL MEDICINE
Payer: MEDICARE

## 2023-11-28 DIAGNOSIS — I48.91 ATRIAL FIBRILLATION WITH RVR: ICD-10-CM

## 2023-11-28 DIAGNOSIS — I48.91 ATRIAL FIBRILLATION WITH RVR: Primary | ICD-10-CM

## 2023-11-28 DIAGNOSIS — Z79.01 LONG TERM (CURRENT) USE OF ANTICOAGULANTS: ICD-10-CM

## 2023-11-28 LAB
INR PPP: 1.5 (ref 0.8–1.2)
PROTHROMBIN TIME: 16.1 SEC (ref 9–12.5)

## 2023-11-28 PROCEDURE — 36415 COLL VENOUS BLD VENIPUNCTURE: CPT | Mod: PN | Performed by: INTERNAL MEDICINE

## 2023-11-28 PROCEDURE — 93793 PR ANTICOAGULANT MGMT FOR PT TAKING WARFARIN: ICD-10-PCS | Mod: S$GLB,,,

## 2023-11-28 PROCEDURE — 85610 PROTHROMBIN TIME: CPT | Mod: PN | Performed by: INTERNAL MEDICINE

## 2023-11-28 PROCEDURE — 93793 ANTICOAG MGMT PT WARFARIN: CPT | Mod: S$GLB,,,

## 2023-12-07 DIAGNOSIS — Z79.01 LONG TERM (CURRENT) USE OF ANTICOAGULANTS: ICD-10-CM

## 2023-12-07 RX ORDER — WARFARIN 3 MG/1
3 TABLET ORAL
Qty: 90 TABLET | Refills: 3 | Status: SHIPPED | OUTPATIENT
Start: 2023-12-07

## 2023-12-08 NOTE — ADDENDUM NOTE
Addended by: MANNIE RASHID on: 12/8/2023 01:21 PM     Modules accepted: Orders     Metronidazole Counseling:  I discussed with the patient the risks of metronidazole including but not limited to seizures, nausea/vomiting, a metallic taste in the mouth, nausea/vomiting and severe allergy.

## 2023-12-12 NOTE — PROGRESS NOTES
12/12/23 patient returned call regarding 12/11 missed lab, reports she has had a cold since Friday -taking Dayquil or Tussin--alternating, verified correct warfarin dose, Patient to call when feeling better to schedule lab

## 2023-12-27 NOTE — PROGRESS NOTES
12/27/23  Patient called to reschedule 12/18 missed lab to 12/28/23, reports has been taking 3 mg Monday and Friday and 1.5mg all other days for last few weeks

## 2023-12-28 ENCOUNTER — LAB VISIT (OUTPATIENT)
Dept: LAB | Facility: HOSPITAL | Age: 78
End: 2023-12-28
Attending: FAMILY MEDICINE
Payer: MEDICARE

## 2023-12-28 DIAGNOSIS — Z79.01 LONG TERM (CURRENT) USE OF ANTICOAGULANTS: ICD-10-CM

## 2023-12-28 DIAGNOSIS — I48.91 ATRIAL FIBRILLATION WITH RVR: ICD-10-CM

## 2023-12-28 LAB
INR PPP: 1.6 (ref 0.8–1.2)
PROTHROMBIN TIME: 17.1 SEC (ref 9–12.5)

## 2023-12-28 PROCEDURE — 85610 PROTHROMBIN TIME: CPT | Mod: PN | Performed by: FAMILY MEDICINE

## 2023-12-28 PROCEDURE — 36415 COLL VENOUS BLD VENIPUNCTURE: CPT | Mod: PN | Performed by: FAMILY MEDICINE

## 2023-12-29 ENCOUNTER — ANTI-COAG VISIT (OUTPATIENT)
Dept: CARDIOLOGY | Facility: CLINIC | Age: 78
End: 2023-12-29
Payer: MEDICARE

## 2023-12-29 DIAGNOSIS — Z79.01 LONG TERM (CURRENT) USE OF ANTICOAGULANTS: Primary | ICD-10-CM

## 2023-12-29 DIAGNOSIS — I48.91 ATRIAL FIBRILLATION WITH RVR: ICD-10-CM

## 2024-01-04 LAB
LEFT EYE DM RETINOPATHY: POSITIVE
RIGHT EYE DM RETINOPATHY: POSITIVE

## 2024-01-10 NOTE — PROGRESS NOTES
1/10/24  Patient called to reschedule 1/09 missed lab, wants to reschedule to Friday 1/12 at 10:00 am, reports she had a fall 1/08 -did not hit her head, just soreness on buttocks, no bleeding, also was not going to clinic 1/09 due to bad weather, verified correct warfarin dose per calendar, also has new new ph # [phone]

## 2024-01-11 RX ORDER — TRAMADOL HYDROCHLORIDE 50 MG/1
TABLET ORAL
Qty: 60 TABLET | Refills: 2 | Status: SHIPPED | OUTPATIENT
Start: 2024-01-11

## 2024-01-11 NOTE — PROGRESS NOTES
1/11/24 Patient called to report she will be going to [facility] imaging and can get lab drawn today at [facility] ph [phone], Admissions: fax # [phone]

## 2024-01-12 ENCOUNTER — ANTI-COAG VISIT (OUTPATIENT)
Dept: CARDIOLOGY | Facility: CLINIC | Age: 79
End: 2024-01-12
Payer: MEDICARE

## 2024-01-12 DIAGNOSIS — Z79.01 LONG TERM (CURRENT) USE OF ANTICOAGULANTS: ICD-10-CM

## 2024-01-12 DIAGNOSIS — I48.91 ATRIAL FIBRILLATION WITH RVR: Primary | ICD-10-CM

## 2024-01-12 LAB — INR PPP: 2.15

## 2024-01-12 PROCEDURE — 93793 ANTICOAG MGMT PT WARFARIN: CPT | Mod: S$GLB,,,

## 2024-01-12 NOTE — TELEPHONE ENCOUNTER
Care Due:                  Date            Visit Type   Department     Provider  --------------------------------------------------------------------------------                                EP -                              PRIMARY      [facility] FAMILY  Last Visit: 08-      CARE ([facility])   MEDICINE       Anders Chanel  Next Visit: None Scheduled  None         None Found                                                            Last  Test          Frequency    Reason                     Performed    Due Date  --------------------------------------------------------------------------------    HBA1C.......  6 months...  insulin, semaglutide.....  08- 02-    Mg Level....  12 months..  alendronate..............  Not Found    Overdue    Phosphate...  12 months..  alendronate..............  Not Found    Overdue    Vitamin D...  12 months..  alendronate..............  Not Found    Overdue    [employer] Embedded Care Due Messages. Reference number: [id number].   1/11/2024 6:00:45 PM CST

## 2024-01-24 ENCOUNTER — PATIENT OUTREACH (OUTPATIENT)
Dept: ADMINISTRATIVE | Facility: HOSPITAL | Age: 79
End: 2024-01-24
Payer: MEDICARE

## 2024-02-08 ENCOUNTER — LAB VISIT (OUTPATIENT)
Dept: LAB | Facility: HOSPITAL | Age: 79
End: 2024-02-08
Attending: FAMILY MEDICINE
Payer: MEDICARE

## 2024-02-08 DIAGNOSIS — Z79.01 LONG TERM (CURRENT) USE OF ANTICOAGULANTS: ICD-10-CM

## 2024-02-08 DIAGNOSIS — I48.91 ATRIAL FIBRILLATION WITH RVR: ICD-10-CM

## 2024-02-08 LAB
INR PPP: 1.4 (ref 0.8–1.2)
PROTHROMBIN TIME: 14.7 SEC (ref 9–12.5)

## 2024-02-08 PROCEDURE — 36415 COLL VENOUS BLD VENIPUNCTURE: CPT | Mod: PN | Performed by: FAMILY MEDICINE

## 2024-02-08 PROCEDURE — 85610 PROTHROMBIN TIME: CPT | Mod: PN | Performed by: FAMILY MEDICINE

## 2024-02-09 ENCOUNTER — ANTI-COAG VISIT (OUTPATIENT)
Dept: CARDIOLOGY | Facility: CLINIC | Age: 79
End: 2024-02-09
Payer: MEDICARE

## 2024-02-09 DIAGNOSIS — I48.91 ATRIAL FIBRILLATION WITH RVR: Primary | ICD-10-CM

## 2024-02-09 DIAGNOSIS — Z79.01 LONG TERM (CURRENT) USE OF ANTICOAGULANTS: ICD-10-CM

## 2024-02-09 PROCEDURE — 93793 ANTICOAG MGMT PT WARFARIN: CPT | Mod: S$GLB,,,

## 2024-02-09 NOTE — PROGRESS NOTES
[facility]    2024 9:28 AM    Assessment/Plan:    Patient presents today with subtherapeutic  INR.    Assessment of patient findings and chart review: INR below goal; only changes reported per patient are decreased appetite (recently started Ozempic) and some leg swelling (recently started amlodipine; taking furosemide).    Recommendation for patient's warfarin regimen: Boost dose today to 4.5mg then resume current maintenance dose    Recommend repeat INR in 1 week  _________________________________________________________________    Yajaira Terry (78 y.o.) is followed by the [facility].    Anticoagulation Summary  As of 2024      INR goal:  2.0-3.0   TTR:  49.5 % (2.1 y)   INR used for dosin.4 (2024)   Warfarin maintenance plan:  1.5 mg (3 mg x 0.5) every Sun, Tue, Thu; 3 mg (3 mg x 1) all other days   Weekly warfarin total:  16.5 mg   Plan last modified:  Kirstie Loza, PharmD (2024)   Next INR check:  2/15/2024   Target end date:      Indications    Atrial fibrillation with RVR [I48.91]  Long term (current) use of anticoagulants [Z79.01]                 Anticoagulation Episode Summary       INR check location:  Clinic Lab    Preferred lab:      Send INR reminders to:  [facility] COUMADIN MONITORING POOL    Comments:  [facility] ph [phone], fax # [phone]          Anticoagulation Care Providers       Provider Role Specialty Phone number    Anders Chanel MD  Family Medicine [phone]    Anila Patrick MD  Interventional Cardiology [phone]            Patient Findings       Positives:  Change in diet/appetite, Other complaints    Negatives:  Signs/symptoms of bleeding, Change in health, Change in alcohol use, Change in activity, Upcoming invasive procedure, Emergency department visit, Upcoming dental procedure, Missed doses, Extra  doses, Change in medications, Hospital admission, Bruising    Comments:  Pt verified correct dose, less appetite thinks due to taking Ozempic, reports swelling in legs

## 2024-02-20 ENCOUNTER — LAB VISIT (OUTPATIENT)
Dept: LAB | Facility: HOSPITAL | Age: 79
End: 2024-02-20
Attending: FAMILY MEDICINE
Payer: MEDICARE

## 2024-02-20 DIAGNOSIS — I48.91 ATRIAL FIBRILLATION WITH RVR: ICD-10-CM

## 2024-02-20 DIAGNOSIS — Z79.01 LONG TERM (CURRENT) USE OF ANTICOAGULANTS: ICD-10-CM

## 2024-02-20 LAB
INR PPP: 1.9 (ref 0.8–1.2)
PROTHROMBIN TIME: 20 SEC (ref 9–12.5)

## 2024-02-20 PROCEDURE — 36415 COLL VENOUS BLD VENIPUNCTURE: CPT | Mod: PN | Performed by: FAMILY MEDICINE

## 2024-02-20 PROCEDURE — 85610 PROTHROMBIN TIME: CPT | Mod: PN | Performed by: FAMILY MEDICINE

## 2024-02-21 ENCOUNTER — ANTI-COAG VISIT (OUTPATIENT)
Dept: CARDIOLOGY | Facility: CLINIC | Age: 79
End: 2024-02-21
Payer: MEDICARE

## 2024-02-21 DIAGNOSIS — Z79.01 LONG TERM (CURRENT) USE OF ANTICOAGULANTS: ICD-10-CM

## 2024-02-21 DIAGNOSIS — I48.91 ATRIAL FIBRILLATION WITH RVR: Primary | ICD-10-CM

## 2024-02-21 PROCEDURE — 93793 ANTICOAG MGMT PT WARFARIN: CPT | Mod: S$GLB,,,

## 2024-02-21 RX ORDER — SEMAGLUTIDE 0.68 MG/ML
INJECTION, SOLUTION SUBCUTANEOUS
COMMUNITY
Start: 2024-01-24 | End: 2024-02-25

## 2024-02-25 RX ORDER — SEMAGLUTIDE 0.68 MG/ML
INJECTION, SOLUTION SUBCUTANEOUS
Qty: 9 EACH | Refills: 10 | Status: SHIPPED | OUTPATIENT
Start: 2024-02-25

## 2024-02-26 NOTE — TELEPHONE ENCOUNTER
Refill Routing Note   Medication(s) are not appropriate for processing by [facility] for the following reason(s):      Required labs outdated    [facility] action(s):  Defer Care Due:  None identified            Appointments  past 12m or future 3m with PCP    Date Provider   Last Visit   8/16/2023 Anders Chanel MD   Next Visit   Visit date not found Anders Chanel MD   ED visits in past 90 days: 0        Note composed:7:15 PM 02/25/2024

## 2024-02-26 NOTE — TELEPHONE ENCOUNTER
No care due was identified.  [facility] Embedded Care Due Messages. Reference number: [id number].   2/25/2024 7:09:43 PM CST

## 2024-02-29 ENCOUNTER — LAB VISIT (OUTPATIENT)
Dept: LAB | Facility: HOSPITAL | Age: 79
End: 2024-02-29
Attending: FAMILY MEDICINE
Payer: MEDICARE

## 2024-02-29 DIAGNOSIS — I48.91 ATRIAL FIBRILLATION WITH RVR: ICD-10-CM

## 2024-02-29 DIAGNOSIS — Z79.01 LONG TERM (CURRENT) USE OF ANTICOAGULANTS: ICD-10-CM

## 2024-02-29 LAB
INR PPP: 2.2 (ref 0.8–1.2)
PROTHROMBIN TIME: 23.2 SEC (ref 9–12.5)

## 2024-02-29 PROCEDURE — 85610 PROTHROMBIN TIME: CPT | Mod: PN | Performed by: FAMILY MEDICINE

## 2024-02-29 PROCEDURE — 36415 COLL VENOUS BLD VENIPUNCTURE: CPT | Mod: PN | Performed by: FAMILY MEDICINE

## 2024-03-01 ENCOUNTER — ANTI-COAG VISIT (OUTPATIENT)
Dept: CARDIOLOGY | Facility: CLINIC | Age: 79
End: 2024-03-01
Payer: MEDICARE

## 2024-03-01 DIAGNOSIS — Z79.01 LONG TERM (CURRENT) USE OF ANTICOAGULANTS: ICD-10-CM

## 2024-03-01 DIAGNOSIS — I48.91 ATRIAL FIBRILLATION WITH RVR: Primary | ICD-10-CM

## 2024-03-01 PROCEDURE — 93793 ANTICOAG MGMT PT WARFARIN: CPT | Mod: S$GLB,,,

## 2024-03-14 ENCOUNTER — LAB VISIT (OUTPATIENT)
Dept: LAB | Facility: HOSPITAL | Age: 79
End: 2024-03-14
Attending: FAMILY MEDICINE
Payer: MEDICARE

## 2024-03-14 DIAGNOSIS — I48.91 ATRIAL FIBRILLATION WITH RVR: ICD-10-CM

## 2024-03-14 DIAGNOSIS — Z79.01 LONG TERM (CURRENT) USE OF ANTICOAGULANTS: ICD-10-CM

## 2024-03-14 LAB
INR PPP: 1.7 (ref 0.8–1.2)
PROTHROMBIN TIME: 18.2 SEC (ref 9–12.5)

## 2024-03-14 PROCEDURE — 36415 COLL VENOUS BLD VENIPUNCTURE: CPT | Mod: PN | Performed by: FAMILY MEDICINE

## 2024-03-14 PROCEDURE — 85610 PROTHROMBIN TIME: CPT | Mod: PN | Performed by: FAMILY MEDICINE

## 2024-03-15 ENCOUNTER — ANTI-COAG VISIT (OUTPATIENT)
Dept: CARDIOLOGY | Facility: CLINIC | Age: 79
End: 2024-03-15
Payer: MEDICARE

## 2024-03-15 DIAGNOSIS — Z79.01 LONG TERM (CURRENT) USE OF ANTICOAGULANTS: ICD-10-CM

## 2024-03-15 DIAGNOSIS — I48.91 ATRIAL FIBRILLATION WITH RVR: Primary | ICD-10-CM

## 2024-03-15 PROCEDURE — 93793 ANTICOAG MGMT PT WARFARIN: CPT | Mod: S$GLB,,,

## 2024-03-15 NOTE — PROGRESS NOTES
INR low. Pt reports stopping amio on 3/13. Pt newly established with outside cardiologist and no longer following with [facility]. Reviewed new cardiologists note which orders pt to see their coumadin clinic. We will address today's INR then pt needs to follow up with current provider. Pt needs repeat INR within 2 weeks. Additionally, per MD note, amiodarone was not stopped it was decreased to 100mg daily. Advise pt to contact Dr. Reynoso to confirm amiodarone plans and get INR scheduled for their coumadin clinic to manage.

## 2024-03-28 ENCOUNTER — TELEPHONE (OUTPATIENT)
Dept: CARDIOLOGY | Facility: CLINIC | Age: 79
End: 2024-03-28
Payer: MEDICARE

## 2024-03-28 ENCOUNTER — TELEPHONE (OUTPATIENT)
Dept: FAMILY MEDICINE | Facility: CLINIC | Age: 79
End: 2024-03-28
Payer: MEDICARE

## 2024-03-28 NOTE — TELEPHONE ENCOUNTER
Unable to get in contact with patient at [phone]  Subscriber is not available.  Unable to leave a voice message.       Attempt to return patient's call to [phone].  Left a voicemail to call back at patient's convenience.

## 2024-03-28 NOTE — TELEPHONE ENCOUNTER
----- Message from Tasha Perry sent at 3/28/2024  9:33 AM CDT -----  Type:  Sooner Appointment Request    Caller is requesting a sooner appointment.  Caller declined first available appointment listed below.  Caller will not accept being placed on the waitlist and is requesting a message be sent to doctor.  Name of Caller:pt  When is the first available appointment?n/a  Symptoms:Handle Diabetes  Would the patient rather a call back or a response via [facility]? call  Best Call Back Number: [phone]  Additional Information:

## 2024-03-28 NOTE — TELEPHONE ENCOUNTER
----- Message from Tasha Perry sent at 3/28/2024  9:31 AM CDT -----  Type:  Sooner Appointment Request    Caller is requesting a sooner appointment.  Caller declined first available appointment listed below.  Caller will not accept being placed on the waitlist and is requesting a message be sent to doctor.  Name of Caller:pt  When is the first available appointment?n/a  Symptoms:CHF  Would the patient rather a call back or a response via [facility]? call  Best Call Back Number: [phone]  Additional Information:

## 2024-04-02 ENCOUNTER — LAB VISIT (OUTPATIENT)
Dept: LAB | Facility: HOSPITAL | Age: 79
End: 2024-04-02
Attending: INTERNAL MEDICINE
Payer: MEDICARE

## 2024-04-02 DIAGNOSIS — N18.32 STAGE 3B CHRONIC KIDNEY DISEASE: ICD-10-CM

## 2024-04-02 DIAGNOSIS — E11.52 TYPE 2 DIABETES MELLITUS WITH DIABETIC PERIPHERAL ANGIOPATHY WITH GANGRENE: Primary | ICD-10-CM

## 2024-04-02 LAB
ALBUMIN/CREAT UR: 130.8 UG/MG (ref 0–30)
ANION GAP SERPL CALC-SCNC: 9 MMOL/L (ref 8–16)
CALCIUM SERPL-MCNC: 7.9 MG/DL (ref 8.7–10.5)
CHLORIDE SERPL-SCNC: 108 MMOL/L (ref 95–110)
CO2 SERPL-SCNC: 26 MMOL/L (ref 23–29)
CREAT SERPL-MCNC: 1.28 MG/DL (ref 0.5–1.4)
CREAT UR-MCNC: 182 MG/DL (ref 15–325)
ERYTHROCYTE [DISTWIDTH] IN BLOOD BY AUTOMATED COUNT: 12.8 % (ref 11.5–14.5)
EST. GFR  (NO RACE VARIABLE): 42.6 ML/MIN/1.73 M^2
ESTIMATED AVG GLUCOSE: 123 MG/DL (ref 68–131)
GLUCOSE SERPL-MCNC: 136 MG/DL (ref 70–110)
HBA1C MFR BLD: 5.9 % (ref 4–5.6)
HCT VFR BLD AUTO: 37.7 % (ref 37–48.5)
HGB BLD-MCNC: 11.9 G/DL (ref 12–16)
MCH RBC QN AUTO: 30.5 PG (ref 27–31)
MCHC RBC AUTO-ENTMCNC: 31.6 G/DL (ref 32–36)
MCV RBC AUTO: 97 FL (ref 82–98)
MICROALBUMIN UR DL<=1MG/L-MCNC: 238 UG/ML
PLATELET # BLD AUTO: 270 K/UL (ref 150–450)
PMV BLD AUTO: 9.7 FL (ref 9.2–12.9)
POTASSIUM SERPL-SCNC: 3.8 MMOL/L (ref 3.5–5.1)
RBC # BLD AUTO: 3.9 M/UL (ref 4–5.4)
SODIUM SERPL-SCNC: 143 MMOL/L (ref 136–145)
UUN UR-MCNC: 24 MG/DL (ref 7–17)
WBC # BLD AUTO: 7.86 K/UL (ref 3.9–12.7)

## 2024-04-02 PROCEDURE — 82043 UR ALBUMIN QUANTITATIVE: CPT | Mod: PN | Performed by: INTERNAL MEDICINE

## 2024-04-02 PROCEDURE — 83036 HEMOGLOBIN GLYCOSYLATED A1C: CPT | Performed by: INTERNAL MEDICINE

## 2024-04-02 PROCEDURE — 85027 COMPLETE CBC AUTOMATED: CPT | Mod: PN | Performed by: INTERNAL MEDICINE

## 2024-04-02 PROCEDURE — 36415 COLL VENOUS BLD VENIPUNCTURE: CPT | Mod: PN | Performed by: INTERNAL MEDICINE

## 2024-04-02 PROCEDURE — 80048 BASIC METABOLIC PNL TOTAL CA: CPT | Mod: PN,XB | Performed by: INTERNAL MEDICINE

## 2024-04-03 NOTE — TELEPHONE ENCOUNTER
Pt called to inform the clinic that she will have [employer] insurance as of May 2024. She will call back once she has new insurance information.

## 2024-04-05 ENCOUNTER — HOSPITAL ENCOUNTER (EMERGENCY)
Facility: HOSPITAL | Age: 79
Discharge: HOME | End: 2024-04-06
Attending: EMERGENCY MEDICINE
Payer: MEDICARE

## 2024-04-05 VITALS
WEIGHT: 200 LBS | SYSTOLIC BLOOD PRESSURE: 138 MMHG | RESPIRATION RATE: 16 BRPM | OXYGEN SATURATION: 98 % | HEART RATE: 65 BPM | BODY MASS INDEX: 39.06 KG/M2 | DIASTOLIC BLOOD PRESSURE: 67 MMHG | TEMPERATURE: 98 F

## 2024-04-05 DIAGNOSIS — M25.469 KNEE SWELLING: Primary | ICD-10-CM

## 2024-04-05 DIAGNOSIS — M79.662 PAIN OF LEFT CALF: ICD-10-CM

## 2024-04-05 PROCEDURE — 99284 EMERGENCY DEPT VISIT MOD MDM: CPT | Mod: 25

## 2024-04-05 RX ORDER — TRAMADOL HYDROCHLORIDE 50 MG/1
50 TABLET ORAL EVERY 12 HOURS PRN
Qty: 12 TABLET | Refills: 0 | Status: SHIPPED | OUTPATIENT
Start: 2024-04-05

## 2024-04-06 NOTE — DISCHARGE INSTRUCTIONS
You have a lump in the back of your knee that is either bleeding or a cyst or a mass. This needs further evaluation and workup. Wear the ace wrap to make sure it does not expand, which will cause pain. Follow up with your doctor as soon as possible and come back if you have worsening pain, the area expands quickly, or other symptoms.

## 2024-04-06 NOTE — ED NOTES
Review of patient's allergies indicates:   Allergen Reactions    Propranolol Other (See Comments)    Sulfa (sulfonamide antibiotics)         Patient has verified the spelling of their name and  on armband.   APPEARANCE: Patient is alert, calm, oriented x 4, and does not appear distressed.  SKIN: Skin is normal for race, warm, and dry. Normal skin turgor and mucous membranes moist.  CARDIAC: Normal rate and rhythm, no murmur heard.   RESPIRATORY:Normal rate and effort. Breath sounds clear bilaterally throughout chest. Respirations are equal and unlabored.    GASTRO: Bowel sounds normal, abdomen is soft, no tenderness, and no abdominal distention.  MUSCLE: Full ROM. No bony tenderness or soft tissue tenderness. No obvious deformity. +pain felt to left knee and up left thigh

## 2024-04-06 NOTE — ED PROVIDER NOTES
Emergency Department Encounter  Provider Note  Encounter Date: 2024    Patient Name: Yajaira Terry  MRN: [id number]    History of Present Illness   HPI  History of Present Illness:    Chief Complaint:   Chief Complaint   Patient presents with    Leg Pain     Patient reports left lateral and medial leg pain present above the knee for 3-4 days.  Patient states she has swelling to the same area.  No swelling appreciated at this time.  No redness.  No heat.  Patient denies injury.     78-year-old female on Coumadin presenting with 2 weeks of left knee pain.  Denies trauma.  States that it worsened 3 days ago, does not notice any worsening swelling, redness.  Patient denies falling.  Has been taking tramadol and Tylenol with some improvement in symptoms.    The following PMH/PSH/SocHx/FamHx has been reviewed by myself:  Past Medical History:   Diagnosis Date    Arthritis     Atrial fibrillation     CHF (congestive heart failure)     CKD (chronic kidney disease) stage 4, GFR 15-29 ml/min     Congenital heart disease     Coronary artery disease     Diabetes mellitus     Diabetes mellitus, type 2     Dyslipidemia     Fractures     Hematuria     Hyperphosphatemia     Nephrolithiasis     Osteoporosis, unspecified     Proteinuria     Renal cell carcinoma of right kidney     Solitary left kidney     Tobacco use     Type 2 diabetes mellitus with unspecified diabetic retinopathy without macular edema     Urinary tract infection     Vaginal infection      Past Surgical History:   Procedure Laterality Date    APPENDECTOMY       SECTION          ELBOW SURGERY      HAND SURGERY      HYSTERECTOMY       - partial    INCONTINENCE SURGERY      KNEE SURGERY      LAPAROSCOPIC ROBOT-ASSISTED SURGICAL REMOVAL OF KIDNEY USING DA RAYMON XI Right     TREATMENT OF CARDIAC ARRHYTHMIA N/A 2022    Procedure: Cardioversion or Defibrillation;  Surgeon: Anila Patrick MD;  Location: [facility] CATH LAB/EP;  Service: Cardiology;   Laterality: N/A;    WRIST FRACTURE SURGERY       Social History     Tobacco Use    Smoking status: Former     Current packs/day: 0.00     Types: Cigarettes     Quit date: 2017     Years since quittin.8     Passive exposure: Past    Smokeless tobacco: Never   Substance Use Topics    Alcohol use: No    Drug use: No     Family History   Problem Relation Age of Onset    Kidney disease Maternal Grandmother     Breast cancer Sister     Breast cancer Cousin      Allergies reviewed:  Review of patient's allergies indicates:   Allergen Reactions    Propranolol Other (See Comments)    Sulfa (sulfonamide antibiotics)      Medications reviewed:  Discharge Medication List as of 2024 11:46 PM        START taking these medications    Details   !! traMADoL (ULTRAM) 50 mg tablet Take 1 tablet (50 mg total) by mouth every 12 (twelve) hours as needed for Pain., Starting 2024, Print       !! - Potential duplicate medications found. Please discuss with provider.        CONTINUE these medications which have NOT CHANGED    Details   warfarin (COUMADIN) 3 MG tablet TAKE 1 TABLET BY MOUTH DAILY AS DIRECTED BY COUMADIN CLINIC, Starting Thu 2023, Normal      acetaminophen (TYLENOL) 325 MG tablet Take 325 mg by mouth every 6 (six) hours as needed for Pain., Historical Med      alendronate (FOSAMAX) 35 MG tablet Take 1 tablet (35 mg total) by mouth once a week., Starting Mon 2023, Normal      amiodarone (PACERONE) 200 MG Tab Take 1 tablet (200 mg total) by mouth once daily., Starting Mon 2023, Normal      !! blood sugar diagnostic (TRUE METRIX GLUCOSE TEST STRIP) Strp To check BG 2 times daily, to use with insurance preferred meter, Normal      blood-glucose meter (TRUE METRIX GLUCOSE METER) Misc 1 each by Misc.(Non-Drug; Combo Route) route as needed (to check blood sugar)., Starting 2021, Normal      blood-glucose meter kit To check BG 2 times daily, to use with insurance preferred meter, Normal     "  COMBIGAN 0.2-0.5 % Drop Place 1 drop into both eyes 2 (two) times daily., Starting Sun 4/12/2020, Historical Med      docusate sodium (COLACE) 100 MG capsule Take 100 mg by mouth 2 (two) times daily., Historical Med      insulin (LANTUS SOLOSTAR U-100 INSULIN) glargine 100 units/mL SubQ pen INJECT 60 UNITS SUBCUTANEOUSLY ONCE DAILY., Normal      !! lancets Misc To check BG 2 times daily, to use with insurance preferred meter, Normal      latanoprost 0.005 % ophthalmic solution PLACE 1 DROP INTO LEFT EYE AT BEDTIME, Historical Med      metoprolol succinate (TOPROL-XL) 50 MG 24 hr tablet Take 0.5 tablets (25 mg total) by mouth once daily., Starting Mon 2/6/2023, Print      OZEMPIC 0.25 mg or 0.5 mg (2 mg/3 mL) pen injector INJECT 0.5MG INTO SKIN EVERY 7 DAYS, Normal      pen needle, diabetic 33 gauge x 5/16" Ndle 1 Units by Misc.(Non-Drug; Combo Route) route 3 (three) times daily as needed (to inject insulin)., Starting Mon 4/19/2021, Normal      pravastatin (PRAVACHOL) 40 MG tablet Take 1 tablet (40 mg total) by mouth every evening., Starting Tue 5/16/2023, Normal      !! traMADoL (ULTRAM) 50 mg tablet TAKE 1 TABLET BY MOUTH EVERY 12 HOURS AS NEEDED FOR PAIN, Normal      !! TRUE METRIX GLUCOSE TEST STRIP Strp TEST BLOOD SUGAR TWICE DAILY, Normal      !! TRUEPLUS LANCETS 33 gauge Misc Starting Fri 3/11/2022, Historical Med       !! - Potential duplicate medications found. Please discuss with provider.          Physical Exam     Initial Vitals [04/05/24 2141]   BP Pulse Resp Temp SpO2   138/67 65 16 98 °F (36.7 °C) 98 %      MAP       --           Triage vital signs reviewed.    Constitutional: Well-nourished, well-developed. Not in acute distress.  HENT: Normocephalic, atraumatic. Moist mucous membranes.  Eyes: No conjunctival injection.  Resp: Normal respiratory effort, breathing unlabored.  Cardio: Regular rate and rhythm.  GI: Abdomen non-distended.   MSK: L knee swollen, pain with palpation laterally. Able to " range minimally due to pain and swelling. Pain with calf palpation. Old hematoma to anterior knee. Normal distal pulse.   Skin: Warm and dry. No rashes or lesions noted.  Neuro: Awake and alert. Moves all extremities.    ED Course   Procedures    Medical Decision Making    History Acquisition     The history is provided by the patient.     Review of prior external/non ED notes: INR 3.7 4/2. Hx afib on coumadin. Cardiology note 3/2024 to establish care, no changes to medication    Differential Diagnoses   Based on available information and initial assessment, the working Differential Diagnosis includes, but is not limited to:  Fracture, dislocation, compartment syndrome, nerve injury/palsy, vascular injury, DVT, rhabdomyolysis, hemarthrosis, septic joint, cellulitis, bursitis, muscle strain, ligament tear/sprain, laceration, foreign body, abrasion, soft tissue contusion, osteoarthritis.    EKG   EKG ordered and independently reviewed by me:     Labs   Lab tests ordered and independently reviewed by me:    Labs Reviewed - No data to display    Imaging   Imaging ordered and independently reviewed by me:   Imaging Results               US Lower Extremity Veins Left (Final result)  Result time 04/05/24 23:22:28      Final result by Ayo Nix MD (04/05/24 23:22:28)                   Impression:      No evidence of deep venous thrombosis in the left lower extremity.    Complex mass left popliteal fossa with mixed echogenicity measuring up to 4.9 cm.  Differential considerations include hematoma, complex/hemorrhagic popliteal fossa cyst or mass lesion.  Recommend follow-up.    This report was flagged in Epic as abnormal.      Electronically signed by: Ayo Nix MD  Date:    04/05/2024  Time:    23:22               Narrative:    EXAMINATION:  US LOWER EXTREMITY VEINS LEFT    CLINICAL HISTORY:  Pain in left lower leg    TECHNIQUE:  Duplex and color flow Doppler evaluation and graded compression of the left  lower extremity veins was performed.    COMPARISON:  None    FINDINGS:  Left thigh veins: The common femoral, femoral, popliteal, upper greater saphenous, and deep femoral veins are patent and free of thrombus. The veins are normally compressible and have normal phasic flow and augmentation response.    Left calf veins: The visualized calf veins are patent.    Contralateral CFV: The contralateral (right) common femoral vein is patent and free of thrombus.    Miscellaneous: Complex mass left popliteal fossa with mixed echogenicity measuring 4.9 x 2.8 x 1.9 cm.                                         Additional Consideration   Yajaira Terry  presents to the Emergency Department today with L knee/calf swelling and pain. Atraumatic. Old hematoma healing to L lateral knee. Pt able to range minimally. Not warm to touch. Strong distal pulse. US ordered.     Additional testing considered but not indicated during the course of this workup: further imaging and labwork, not indicated  Co-morbidities/chronic illness/exacerbation of chronic illness considered which impacted clinical decision making: afib on AC, dm  Procedures done in the ED or plan for the OR: No  Social determinants of care considered during development of treatment plan include: Decreased medical literacy  Discussion of management or test interpretation with external provider: No  DNR discussion: No    The patient's list of active medications and allergies as documented per RN staff has been reviewed.  Medications given in the ED and/or prescribed: Medications - No data to display          ED Course as of 04/06/24 0303   Fri Apr 05, 2024   2351 US Lower Extremity Veins Left(!)  No DVT, but hemorrhage versus hemorrhagic cyst versus mass seen.  Patient denies trauma.  INR was checked 3 days ago, 3.7. no indication for further imaging or workup. Low concern septic joint. Says has f/u with her doctor on Monday, can't see in our system; she does go to [facility]. Will  Ace wrap, patient has an appointment with her physician in 3 days.  Wants a refill of tramadol. [CS]      ED Course User Index  [CS] Christine Maria MD       Explanation of disposition: Discharge    Clinical Impression:     1. Knee swelling    2. Pain of left calf         All results from the workup were reviewed with the patient/family in detail. I discussed with the patient and/or the family/caregiver that today's evaluation in the ED does not suggest any emergent or life-threatening medical conditions that would require hospitalization or immediate intervention beyond what was provided in the ED. I believe the patient is safe for discharge.  However, a reassuring evaluation in the ED does not preclude the presence or development of a serious or life-threatening condition. As such, strict return precautions were discussed with the patient expressing understanding of instructions, and all questions answered. The patient/family communicates understanding of all this information and all remaining questions and concerns were addressed at this time.    The patient/family has been provided with verbal and printed direction regarding our final diagnosis(es) as well as instructions regarding use of OTC and/or Rx medications intended to manage the patient's aforementioned conditions including:  ED Prescriptions       Medication Sig Dispense Start Date End Date Auth. Provider    traMADoL (ULTRAM) 50 mg tablet Take 1 tablet (50 mg total) by mouth every 12 (twelve) hours as needed for Pain. 12 tablet 4/5/2024 -- Christine Maria MD          The patient's condition does not warrant review of the  and prescription of controlled substances.      ED Disposition Condition    Discharge                Christine Maria MD  04/06/24 0309

## 2024-09-13 ENCOUNTER — HOSPITAL ENCOUNTER (EMERGENCY)
Facility: HOSPITAL | Age: 79
Discharge: OTHER INSTITUTION | End: 2024-09-14
Attending: STUDENT IN AN ORGANIZED HEALTH CARE EDUCATION/TRAINING PROGRAM
Payer: MEDICARE

## 2024-09-13 VITALS
HEART RATE: 52 BPM | SYSTOLIC BLOOD PRESSURE: 126 MMHG | DIASTOLIC BLOOD PRESSURE: 59 MMHG | WEIGHT: 202 LBS | OXYGEN SATURATION: 96 % | BODY MASS INDEX: 43.58 KG/M2 | TEMPERATURE: 98 F | RESPIRATION RATE: 19 BRPM | HEIGHT: 57 IN

## 2024-09-13 DIAGNOSIS — S82.101A CLOSED FRACTURE OF PROXIMAL END OF RIGHT TIBIA, UNSPECIFIED FRACTURE MORPHOLOGY, INITIAL ENCOUNTER: ICD-10-CM
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
## 2024-09-13 DIAGNOSIS — S42.301A BILATERAL HUMERAL FRACTURES, CLOSED, INITIAL ENCOUNTER: Primary | ICD-10-CM

## 2024-09-13 DIAGNOSIS — S42.302A BILATERAL HUMERAL FRACTURES, CLOSED, INITIAL ENCOUNTER: Primary | ICD-10-CM

## 2024-09-13 LAB
ALBUMIN SERPL BCP-MCNC: 3.3 G/DL (ref 3.5–5.2)
ALP SERPL-CCNC: 86 U/L (ref 55–135)
ALT SERPL W/O P-5'-P-CCNC: 18 U/L (ref 10–44)
ANION GAP SERPL CALC-SCNC: 11 MMOL/L (ref 8–16)
AST SERPL-CCNC: 30 U/L (ref 10–40)
BASOPHILS # BLD AUTO: 0.06 K/UL (ref 0–0.2)
BASOPHILS NFR BLD: 0.3 % (ref 0–1.9)
BILIRUB SERPL-MCNC: 0.7 MG/DL (ref 0.1–1)
BUN SERPL-MCNC: 29 MG/DL (ref 8–23)
CALCIUM SERPL-MCNC: 8.9 MG/DL (ref 8.7–10.5)
CHLORIDE SERPL-SCNC: 109 MMOL/L (ref 95–110)
CO2 SERPL-SCNC: 19 MMOL/L (ref 23–29)
CREAT SERPL-MCNC: 1.8 MG/DL (ref 0.5–1.4)
DIFFERENTIAL METHOD BLD: ABNORMAL
EOSINOPHIL # BLD AUTO: 0.1 K/UL (ref 0–0.5)
EOSINOPHIL NFR BLD: 0.6 % (ref 0–8)
ERYTHROCYTE [DISTWIDTH] IN BLOOD BY AUTOMATED COUNT: 13.2 % (ref 11.5–14.5)
EST. GFR  (NO RACE VARIABLE): 28 ML/MIN/1.73 M^2
GLUCOSE SERPL-MCNC: 192 MG/DL (ref 70–110)
HCT VFR BLD AUTO: 36 % (ref 37–48.5)
HGB BLD-MCNC: 11.8 G/DL (ref 12–16)
IMM GRANULOCYTES # BLD AUTO: 0.1 K/UL (ref 0–0.04)
IMM GRANULOCYTES NFR BLD AUTO: 0.6 % (ref 0–0.5)
LYMPHOCYTES # BLD AUTO: 1.7 K/UL (ref 1–4.8)
LYMPHOCYTES NFR BLD: 9.9 % (ref 18–48)
MCH RBC QN AUTO: 30.9 PG (ref 27–31)
MCHC RBC AUTO-ENTMCNC: 32.8 G/DL (ref 32–36)
MCV RBC AUTO: 94 FL (ref 82–98)
MONOCYTES # BLD AUTO: 0.9 K/UL (ref 0.3–1)
MONOCYTES NFR BLD: 5.2 % (ref 4–15)
NEUTROPHILS # BLD AUTO: 14.4 K/UL (ref 1.8–7.7)
NEUTROPHILS NFR BLD: 83.4 % (ref 38–73)
NRBC BLD-RTO: 0 /100 WBC
PLATELET # BLD AUTO: 226 K/UL (ref 150–450)
PMV BLD AUTO: 10.2 FL (ref 9.2–12.9)
POTASSIUM SERPL-SCNC: 4.3 MMOL/L (ref 3.5–5.1)
PROT SERPL-MCNC: 6.7 G/DL (ref 6–8.4)
RBC # BLD AUTO: 3.82 M/UL (ref 4–5.4)
SODIUM SERPL-SCNC: 139 MMOL/L (ref 136–145)
WBC # BLD AUTO: 17.32 K/UL (ref 3.9–12.7)

## 2024-09-13 PROCEDURE — 96376 TX/PRO/DX INJ SAME DRUG ADON: CPT

## 2024-09-13 PROCEDURE — 80053 COMPREHEN METABOLIC PANEL: CPT | Performed by: STUDENT IN AN ORGANIZED HEALTH CARE EDUCATION/TRAINING PROGRAM

## 2024-09-13 PROCEDURE — 63600175 PHARM REV CODE 636 W HCPCS: Performed by: STUDENT IN AN ORGANIZED HEALTH CARE EDUCATION/TRAINING PROGRAM

## 2024-09-13 PROCEDURE — 96375 TX/PRO/DX INJ NEW DRUG ADDON: CPT

## 2024-09-13 PROCEDURE — 99284 EMERGENCY DEPT VISIT MOD MDM: CPT | Mod: 25

## 2024-09-13 PROCEDURE — 29505 APPLICATION LONG LEG SPLINT: CPT | Mod: RT

## 2024-09-13 PROCEDURE — 96374 THER/PROPH/DIAG INJ IV PUSH: CPT

## 2024-09-13 PROCEDURE — 85025 COMPLETE CBC W/AUTO DIFF WBC: CPT | Performed by: STUDENT IN AN ORGANIZED HEALTH CARE EDUCATION/TRAINING PROGRAM

## 2024-09-13 RX ORDER — HYDROMORPHONE HYDROCHLORIDE 1 MG/ML
1 INJECTION, SOLUTION INTRAMUSCULAR; INTRAVENOUS; SUBCUTANEOUS
Status: COMPLETED | OUTPATIENT
Start: 2024-09-13 | End: 2024-09-13

## 2024-09-13 RX ORDER — DIPHENHYDRAMINE HYDROCHLORIDE 50 MG/ML
12.5 INJECTION INTRAMUSCULAR; INTRAVENOUS
Status: COMPLETED | OUTPATIENT
Start: 2024-09-13 | End: 2024-09-13

## 2024-09-13 RX ORDER — HYDROMORPHONE HYDROCHLORIDE 1 MG/ML
0.5 INJECTION, SOLUTION INTRAMUSCULAR; INTRAVENOUS; SUBCUTANEOUS
Status: DISCONTINUED | OUTPATIENT
Start: 2024-09-13 | End: 2024-09-14 | Stop reason: HOSPADM

## 2024-09-13 RX ORDER — ONDANSETRON HYDROCHLORIDE 2 MG/ML
4 INJECTION, SOLUTION INTRAVENOUS EVERY 6 HOURS PRN
Status: DISCONTINUED | OUTPATIENT
Start: 2024-09-13 | End: 2024-09-14 | Stop reason: HOSPADM

## 2024-09-13 RX ORDER — ONDANSETRON HYDROCHLORIDE 2 MG/ML
4 INJECTION, SOLUTION INTRAVENOUS
Status: COMPLETED | OUTPATIENT
Start: 2024-09-13 | End: 2024-09-13

## 2024-09-13 RX ADMIN — ONDANSETRON 4 MG: 2 INJECTION INTRAMUSCULAR; INTRAVENOUS at 08:09

## 2024-09-13 RX ADMIN — HYDROMORPHONE HYDROCHLORIDE 1 MG: 1 INJECTION, SOLUTION INTRAMUSCULAR; INTRAVENOUS; SUBCUTANEOUS at 08:09

## 2024-09-13 RX ADMIN — HYDROMORPHONE HYDROCHLORIDE 1 MG: 1 INJECTION, SOLUTION INTRAMUSCULAR; INTRAVENOUS; SUBCUTANEOUS at 10:09

## 2024-09-13 RX ADMIN — DIPHENHYDRAMINE HYDROCHLORIDE 12.5 MG: 50 INJECTION, SOLUTION INTRAMUSCULAR; INTRAVENOUS at 11:09

## 2024-09-13 NOTE — ED NOTES
Pt brought in by [facility] from home. Pt tripped and fell on rug in garage. Pt state that she fell forward and landing on knees and with arms outstretched. Initial complaints of right arm and right leg pain. Upon examination by EMS, pt began complaining of left leg pain also. Pt states both arm hurt and cannot move them due to pain. Pt reports that the reason she was going so fast was because she has diarrhea and was rushing to bathroom

## 2024-09-14 NOTE — ED PROVIDER NOTES
"ED Provider Note - 2024    History     Chief Complaint   Patient presents with    Fall     The pt presents to the ED with a complaint of a fall from standing level onto concrete.  The pt was originally complaining of R arm and R knee pain, but has now progressed to "pain everywhere".  No obvious deformity noted.  The pt takes coumadin.        ROGER Terry is a 79 y.o. year old female with past medical and surgical history as seen below, presenting with chief complaint of fall.  Complaining of right upper arm and right knee pain.  Also developed left shoulder pain after arrival.  Mechanical fall.  Tripped on edge of carpet.  Did not hit head.  No loss of consciousness.  No neck pain.        Past Medical History:   Diagnosis Date    Arthritis     Atrial fibrillation     CHF (congestive heart failure)     CKD (chronic kidney disease) stage 4, GFR 15-29 ml/min     Congenital heart disease     Coronary artery disease     Diabetes mellitus     Diabetes mellitus, type 2     Dyslipidemia     Fractures     Hematuria     Hyperphosphatemia     Nephrolithiasis     Osteoporosis, unspecified     Proteinuria     Renal cell carcinoma of right kidney     Solitary left kidney     Tobacco use     Type 2 diabetes mellitus with unspecified diabetic retinopathy without macular edema     Urinary tract infection     Vaginal infection      Past Surgical History:   Procedure Laterality Date    APPENDECTOMY       SECTION          ELBOW SURGERY      HAND SURGERY      HYSTERECTOMY       - partial    INCONTINENCE SURGERY      KNEE SURGERY      LAPAROSCOPIC ROBOT-ASSISTED SURGICAL REMOVAL OF KIDNEY USING DA RAYMON XI Right     TREATMENT OF CARDIAC ARRHYTHMIA N/A 2022    Procedure: Cardioversion or Defibrillation;  Surgeon: Anila Patrick MD;  Location: [facility] CATH LAB/EP;  Service: Cardiology;  Laterality: N/A;    WRIST FRACTURE SURGERY           Family History   Problem Relation Name Age of Onset    Kidney " "disease Maternal Grandmother      Breast cancer Sister      Breast cancer Cousin fourth cousin      Social History     Tobacco Use    Smoking status: Former     Current packs/day: 0.00     Types: Cigarettes     Quit date: 2017     Years since quittin.3     Passive exposure: Past    Smokeless tobacco: Never   Substance Use Topics    Alcohol use: No    Drug use: No     Social Determinants of Health with Concerns     Tobacco Use: Medium Risk (2024)    Patient History     Smoking Tobacco Use: Former     Smokeless Tobacco Use: Never     Passive Exposure: Past   Alcohol Use: Not on file   Physical Activity: Inactive (2/3/2023)    Exercise Vital Sign     Days of Exercise per Week: 0 days     Minutes of Exercise per Session: 0 min   Utilities: Not on file   Health Literacy: Not on file   Social Isolation: Not on file      Review of patient's allergies indicates:   Allergen Reactions    Propranolol Other (See Comments)    Sulfa (sulfonamide antibiotics)        Review of Systems     A full Review of Systems (ROS) was performed and was negative unless otherwise stated in the HPI.      Physical Exam     Vitals:    24 1836 24 1841 24 2050 24 2223   BP: (!) 126/59 (!) 126/59     Pulse: (!) 55 (!) 52     Resp: 20 18 19 19   Temp: 97.6 °F (36.4 °C) 97.9 °F (36.6 °C)     TempSrc: Oral Oral     SpO2: 96% 96%     Weight: 91.6 kg (202 lb) 91.6 kg (202 lb)     Height: 5' (1.524 m) 4' 9" (1.448 m)          Physical Exam    Constitutional: She appears well-developed and well-nourished.   HENT:   Head: Normocephalic and atraumatic.   Mouth/Throat: Oropharynx is clear and moist.   Eyes: EOM are normal. Pupils are equal, round, and reactive to light.   Neck:   Normal range of motion.  Musculoskeletal:      Right shoulder: Tenderness present. No deformity. Decreased range of motion.      Left shoulder: Tenderness present. No deformity. Decreased range of motion.      Cervical back: Normal range of motion. "      Right hip: Normal range of motion.      Left hip: Normal range of motion.      Right knee: Swelling present. No deformity. Decreased range of motion. Tenderness present.     Neurological: She is alert and oriented to person, place, and time.         Lab Results- Independently reviewed by myself      Labs Reviewed   CBC W/ AUTO DIFFERENTIAL - Abnormal       Result Value    WBC 17.32 (*)     RBC 3.82 (*)     Hemoglobin 11.8 (*)     Hematocrit 36.0 (*)     MCV 94      MCH 30.9      MCHC 32.8      RDW 13.2      Platelets 226      MPV 10.2      Immature Granulocytes 0.6 (*)     Gran # (ANC) 14.4 (*)     Immature Grans (Abs) 0.10 (*)     Lymph # 1.7      Mono # 0.9      Eos # 0.1      Baso # 0.06      nRBC 0      Gran % 83.4 (*)     Lymph % 9.9 (*)     Mono % 5.2      Eosinophil % 0.6      Basophil % 0.3      Differential Method Automated     COMPREHENSIVE METABOLIC PANEL - Abnormal    Sodium 139      Potassium 4.3      Chloride 109      CO2 19 (*)     Glucose 192 (*)     BUN 29 (*)     Creatinine 1.8 (*)     Calcium 8.9      Total Protein 6.7      Albumin 3.3 (*)     Total Bilirubin 0.7      Alkaline Phosphatase 86      AST 30      ALT 18      eGFR 28 (*)     Anion Gap 11             Imaging     Imaging Results               X-Ray Knee 1 or 2 View Right (Final result)  Result time 09/13/24 22:13:11   Procedure changed from X-Ray Knee 3 View Right     Final result by Hien Croft MD (09/13/24 22:13:11)                   Impression:      Comminuted proximal tibial metaphyseal fracture without dislocation as detailed above.    Knee effusion.    No other convincing acute abnormalities.    This report was flagged in Epic as abnormal.      Electronically signed by: Hien Croft  Date:    09/13/2024  Time:    22:13               Narrative:    EXAMINATION:  XR KNEE 1 OR 2 VIEW RIGHT    CLINICAL HISTORY:  fall;  Unspecified fall, initial encounter    TECHNIQUE:  AP and lateral views of the right knee were  performed.    COMPARISON:  None    FINDINGS:  There is a comminuted fracture involving the proximal right tibial metaphysis with horizontal dominant component through the metaphysis as well as a vertical component laterally.  Extension into the tibial plateau is not definitely visualized noting osteopenia limits evaluation.  There is a knee effusion.  The patella grossly appears intact.  Degenerative changes in the with chondrocalcinosis in the joint space narrowing noted.  Proximal fibula grossly appears intact as imaged as well as the distal right femur.  Vascular calcifications are noted in the soft tissues.                                        X-Ray Humerus 2 View Right (Final result)  Result time 09/13/24 22:05:37      Final result by Hien Croft MD (09/13/24 22:05:37)                   Impression:      Acute comminuted fractures involving the proximal right and left humeral neck with dislocation on the left.    Widening of the left acromioclavicular joint raising question of ligamentous injury.    Possible bony labral fracture fragment on the left inferiorly.    This report was flagged in Epic as abnormal.      Electronically signed by: Hien Croft  Date:    09/13/2024  Time:    22:05               Narrative:    EXAMINATION:  XR SHOULDER TRAUMA 3 VIEW BILATERAL; XR HUMERUS 2 VIEW RIGHT; XR HUMERUS 2 VIEW LEFT    CLINICAL HISTORY:  fall;Unspecified fall, initial encounter    TECHNIQUE:  Three views of each shoulder and bilateral two views of the humerus were performed.    COMPARISON:  None    FINDINGS:  Right: There is a comminuted acute fracture involving the right humeral neck involving the greater tuberosity and surgical neck.  There is no convincing humeral head dislocation or fracture extending to the articular surface of the right humerus.  Right AC joint appears aligned with mild degenerative changes noted.  There is no convincing fracture involving the right scapula .  The more distal  right humerus grossly appears intact allowing for positioning with limited visualization of the elbow to evaluate for an elbow effusion..    Left: There is an acute fracture involving the left proximal humeral neck with comminution.  The fracture extends through the greater tuberosity with dorsal and lateral displacement of the tuberosity.  Comminuted fracture extends through  the left humeral surgical neck with impaction of the distal fragment.    There is dislocation of the left humeral head caudally and possibly mildly anteriorly.  Small punctate calcification projects caudal to the glenoid raising question of bony labral fracture inferiorly.  There is widening of the left acromioclavicular joint raising question of separation.    Distal left humerus appears intact noting positioning limits evaluation for an elbow effusion or nondisplaced fracture.  Visualized chest/ribs are unremarkable bilaterally.                                       X-Ray Hip 2 or 3 views Right with Pelvis when performed (Final result)  Result time 09/13/24 22:07:58      Final result by Hien Croft MD (09/13/24 22:07:58)                   Impression:      No evidence of acute abnormality involving the right hip or pelvis as imaged.    Additional stable findings as described.      Electronically signed by: Hien Croft  Date:    09/13/2024  Time:    22:07               Narrative:    EXAMINATION:  XR HIP WITH PELVIS WHEN PERFORMED 2 OR 3 VIEWS RIGHT    CLINICAL HISTORY:  Unspecified fall, initial encounter    TECHNIQUE:  AP view of the pelvis and frog leg lateral view of the right hip were performed.    COMPARISON:  03/11/2022 AP pelvis and hip views.    FINDINGS:  There is no appreciable acute fracture or dislocation involving the right hip.  Degenerative changes of the hips noted.  There is stable contour irregularity of the inferior portion of the left femoral head neck.  Degenerative changes of the SI joints bilaterally as well  as the lumbosacral junction noted.  Pubic symphysis appears aligned.  Innumerable pelvic calcifications are likely vascular                                        X-Ray Humerus 2 View Left (Final result)  Result time 09/13/24 22:05:37      Final result by Hien Croft MD (09/13/24 22:05:37)                   Impression:      Acute comminuted fractures involving the proximal right and left humeral neck with dislocation on the left.    Widening of the left acromioclavicular joint raising question of ligamentous injury.    Possible bony labral fracture fragment on the left inferiorly.    This report was flagged in Epic as abnormal.      Electronically signed by: Hien Croft  Date:    09/13/2024  Time:    22:05               Narrative:    EXAMINATION:  XR SHOULDER TRAUMA 3 VIEW BILATERAL; XR HUMERUS 2 VIEW RIGHT; XR HUMERUS 2 VIEW LEFT    CLINICAL HISTORY:  fall;Unspecified fall, initial encounter    TECHNIQUE:  Three views of each shoulder and bilateral two views of the humerus were performed.    COMPARISON:  None    FINDINGS:  Right: There is a comminuted acute fracture involving the right humeral neck involving the greater tuberosity and surgical neck.  There is no convincing humeral head dislocation or fracture extending to the articular surface of the right humerus.  Right AC joint appears aligned with mild degenerative changes noted.  There is no convincing fracture involving the right scapula .  The more distal right humerus grossly appears intact allowing for positioning with limited visualization of the elbow to evaluate for an elbow effusion..    Left: There is an acute fracture involving the left proximal humeral neck with comminution.  The fracture extends through the greater tuberosity with dorsal and lateral displacement of the tuberosity.  Comminuted fracture extends through  the left humeral surgical neck with impaction of the distal fragment.    There is dislocation of the left humeral head  caudally and possibly mildly anteriorly.  Small punctate calcification projects caudal to the glenoid raising question of bony labral fracture inferiorly.  There is widening of the left acromioclavicular joint raising question of separation.    Distal left humerus appears intact noting positioning limits evaluation for an elbow effusion or nondisplaced fracture.  Visualized chest/ribs are unremarkable bilaterally.                                        X-Ray Shoulder Trauma 3 View Bilateral (Final result)  Result time 09/13/24 22:05:37   Procedure changed from X-Ray Shoulder Trauma Left     Final result by Hien Croft MD (09/13/24 22:05:37)                   Impression:      Acute comminuted fractures involving the proximal right and left humeral neck with dislocation on the left.    Widening of the left acromioclavicular joint raising question of ligamentous injury.    Possible bony labral fracture fragment on the left inferiorly.    This report was flagged in Epic as abnormal.      Electronically signed by: Hien Croft  Date:    09/13/2024  Time:    22:05               Narrative:    EXAMINATION:  XR SHOULDER TRAUMA 3 VIEW BILATERAL; XR HUMERUS 2 VIEW RIGHT; XR HUMERUS 2 VIEW LEFT    CLINICAL HISTORY:  fall;Unspecified fall, initial encounter    TECHNIQUE:  Three views of each shoulder and bilateral two views of the humerus were performed.    COMPARISON:  None    FINDINGS:  Right: There is a comminuted acute fracture involving the right humeral neck involving the greater tuberosity and surgical neck.  There is no convincing humeral head dislocation or fracture extending to the articular surface of the right humerus.  Right AC joint appears aligned with mild degenerative changes noted.  There is no convincing fracture involving the right scapula .  The more distal right humerus grossly appears intact allowing for positioning with limited visualization of the elbow to evaluate for an elbow  effusion..    Left: There is an acute fracture involving the left proximal humeral neck with comminution.  The fracture extends through the greater tuberosity with dorsal and lateral displacement of the tuberosity.  Comminuted fracture extends through  the left humeral surgical neck with impaction of the distal fragment.    There is dislocation of the left humeral head caudally and possibly mildly anteriorly.  Small punctate calcification projects caudal to the glenoid raising question of bony labral fracture inferiorly.  There is widening of the left acromioclavicular joint raising question of separation.    Distal left humerus appears intact noting positioning limits evaluation for an elbow effusion or nondisplaced fracture.  Visualized chest/ribs are unremarkable bilaterally.                                    X-Rays:   Independently Interpreted Readings:   Other Readings:  Bilateral humeral fractures along with proximal tibia fracture on right              ED Course         Critical Care    Date/Time: 9/14/2024 4:14 AM    Performed by: Lamin Oliveira MD  Authorized by: Lamin Oliveira MD  Direct patient critical care time: 9 minutes  Additional history critical care time: 7 minutes  Ordering / reviewing critical care time: 7 minutes  Documentation critical care time: 8 minutes  Consulting other physicians critical care time: 7 minutes  Total critical care time (exclusive of procedural time) : 38 minutes  Critical care time was exclusive of separately billable procedures and treating other patients and teaching time.  Critical care was necessary to treat or prevent imminent or life-threatening deterioration of the following conditions: trauma.  Critical care was time spent personally by me on the following activities: blood draw for specimens, discussions with consultants, interpretation of cardiac output measurements, evaluation of patient's response to treatment, examination of patient, obtaining history from  patient or surrogate, ordering and review of laboratory studies, ordering and performing treatments and interventions, ordering and review of radiographic studies, pulse oximetry, re-evaluation of patient's condition and review of old charts.               Orders Placed This Encounter    X-Ray Humerus 2 View Right    X-Ray Humerus 2 View Left    X-Ray Hip 2 or 3 views Right with Pelvis when performed    X-Ray Shoulder Trauma 3 View Bilateral    X-Ray Knee 1 or 2 View Right    CBC auto differential    Comprehensive metabolic panel    Apply sling    Application long leg splint    Insert Saline lock IV    Possible Hospitalization    [facility] Facilitated Request    HYDROmorphone injection 1 mg    ondansetron injection 4 mg    HYDROmorphone injection 1 mg    diphenhydrAMINE injection 12.5 mg    HYDROmorphone injection 0.5 mg    ondansetron injection 4 mg          ED Course as of 09/13/24 2359   Fri Sep 13, 2024   2228 Initiated transfer process as I do not have orthopedic coverage at this time.  [facility] was unable to accept the patient.  Transfer centers unable to discussed patient with [facility] or [facility] as they use answering services.  I have asked that they also discussed case with [facility] to find a facility with the appropriate orthopedic coverage for this patient. [KB]   7452 Discussed with Dr. Fowler, [facility] ED physician.  She accept patient in transfer for further management. [KB]      ED Course User Index  [KB] Lamin Oliveira MD              Medical Decision Making       The patient's list of active medical problems, social history, medications, and allergies as documented per RN staff has been reviewed.     Comorbidities taken into consideration for development of diagnosis and treatment plan include DM and obesity.      Medical Decision Making  79-year-old female presents after a fall.  Differential includes strain, contusion, fracture, dislocation.  Found to have multiple fractures as discussed above.  Given  lack of orthopedic coverage we have utilize the transfer center to find acceptance at [facility] for further assessment.      Amount and/or Complexity of Data Reviewed  External Data Reviewed: notes.  Labs: ordered.  Radiology: ordered and independent interpretation performed.    Risk  Prescription drug management.  Parenteral controlled substances.                        Clinical Impression         Disposition   ED Disposition Condition    Transfer to Another Facility Stable              Final diagnoses:  [W19.XXXA] Fall from standing, initial encounter  [S42.301A, S42.302A] Bilateral humeral fractures, closed, initial encounter (Primary)  [S82.101A] Closed fracture of proximal end of right tibia, unspecified fracture morphology, initial encounter        Lamin Oliveira MD        09/13/2024          DISCLAIMER: This note was prepared with [employer] voice recognition transcription software. Garbled syntax, mangled pronouns, and other bizarre constructions may be attributed to that software system.       Lamin Oliveira MD  09/14/24 0416

## 2025-03-13 LAB
LEFT EYE DM RETINOPATHY: POSITIVE
RIGHT EYE DM RETINOPATHY: POSITIVE

## 2025-03-14 NOTE — TELEPHONE ENCOUNTER
----- Message from Vinod Law sent at 5/30/2023 12:07 PM CDT -----  Contact: Pt.  .Type:  Needs Medical Advice    Who Called: pt    Would the patient rather a call back or a response via [facility]?  Call back  Best Call Back Number: [phone]  Additional Information:  Pt.  is calling to let Jamee the nurse know that the injection she received today is not working and would like to know if she can take something else for pain.         
I would use ice and heat on the area and give it a little time.  
Left detailed voicemail for pt explaining Dr. Garcia message below    Advised to call office with any questions.  
No

## 2025-03-26 ENCOUNTER — PATIENT OUTREACH (OUTPATIENT)
Dept: ADMINISTRATIVE | Facility: HOSPITAL | Age: 80
End: 2025-03-26
Payer: MEDICARE

## 2025-03-26 NOTE — PROGRESS NOTES
Population Health Chart Review & Patient Outreach Details      Additional Pop Health Notes:               Updates Requested / Reviewed:      Updated Care Coordination Note         Health Maintenance Topics Overdue:      VBHM Score: 3     Osteoporosis Screening  Hemoglobin A1c  Lipid Panel    Influenza Vaccine  Pneumonia Vaccine  Tetanus Vaccine  Shingles/Zoster Vaccine  RSV Vaccine                  Health Maintenance Topic(s) Outreach Outcomes & Actions Taken:    Eye Exam - Outreach Outcomes & Actions Taken  : Diabetic Eye External Records Uploaded, Care Team & History Updated if Applicable

## 2025-04-02 ENCOUNTER — TELEPHONE (OUTPATIENT)
Dept: HOME HEALTH SERVICES | Facility: CLINIC | Age: 80
End: 2025-04-02
Payer: MEDICARE

## 2025-04-02 DIAGNOSIS — I50.22 CHRONIC SYSTOLIC HEART FAILURE: ICD-10-CM

## 2025-04-02 DIAGNOSIS — I50.22 CHRONIC HFREF (HEART FAILURE WITH REDUCED EJECTION FRACTION): Primary | ICD-10-CM

## 2025-04-02 DIAGNOSIS — N18.4 CKD (CHRONIC KIDNEY DISEASE), STAGE IV: ICD-10-CM

## 2025-04-02 NOTE — TELEPHONE ENCOUNTER
Received request for [facility] appointment with NP from Paco Jones .  Referral placed and patient will be scheduled.

## 2025-04-04 ENCOUNTER — CARE AT HOME (OUTPATIENT)
Dept: HOME HEALTH SERVICES | Facility: CLINIC | Age: 80
End: 2025-04-04
Payer: MEDICARE

## 2025-04-04 VITALS
DIASTOLIC BLOOD PRESSURE: 75 MMHG | TEMPERATURE: 98 F | SYSTOLIC BLOOD PRESSURE: 147 MMHG | HEART RATE: 67 BPM | RESPIRATION RATE: 17 BRPM | OXYGEN SATURATION: 95 %

## 2025-04-04 DIAGNOSIS — N18.31 STAGE 3A CHRONIC KIDNEY DISEASE: ICD-10-CM

## 2025-04-04 DIAGNOSIS — I48.91 ATRIAL FIBRILLATION WITH RVR: Primary | ICD-10-CM

## 2025-04-04 DIAGNOSIS — E11.22 TYPE 2 DIABETES MELLITUS WITH STAGE 3A CHRONIC KIDNEY DISEASE, WITH LONG-TERM CURRENT USE OF INSULIN: ICD-10-CM

## 2025-04-04 DIAGNOSIS — I50.22 CHRONIC SYSTOLIC HEART FAILURE: ICD-10-CM

## 2025-04-04 DIAGNOSIS — I50.22 CHRONIC HFREF (HEART FAILURE WITH REDUCED EJECTION FRACTION): ICD-10-CM

## 2025-04-04 DIAGNOSIS — Z79.01 LONG TERM (CURRENT) USE OF ANTICOAGULANTS: ICD-10-CM

## 2025-04-04 DIAGNOSIS — Z79.4 TYPE 2 DIABETES MELLITUS WITH STAGE 3A CHRONIC KIDNEY DISEASE, WITH LONG-TERM CURRENT USE OF INSULIN: ICD-10-CM

## 2025-04-04 DIAGNOSIS — N18.31 TYPE 2 DIABETES MELLITUS WITH STAGE 3A CHRONIC KIDNEY DISEASE, WITH LONG-TERM CURRENT USE OF INSULIN: ICD-10-CM

## 2025-04-04 DIAGNOSIS — N18.4 CKD (CHRONIC KIDNEY DISEASE), STAGE IV: ICD-10-CM

## 2025-04-04 PROCEDURE — 3077F SYST BP >= 140 MM HG: CPT | Mod: CPTII,S$GLB,, | Performed by: NURSE PRACTITIONER

## 2025-04-04 PROCEDURE — 3078F DIAST BP <80 MM HG: CPT | Mod: CPTII,S$GLB,, | Performed by: NURSE PRACTITIONER

## 2025-04-04 PROCEDURE — 99350 HOME/RES VST EST HIGH MDM 60: CPT | Mod: S$GLB,,, | Performed by: NURSE PRACTITIONER

## 2025-04-06 PROBLEM — N18.31 STAGE 3A CHRONIC KIDNEY DISEASE: Status: ACTIVE | Noted: 2020-02-27

## 2025-04-06 RX ORDER — BRIMONIDINE TARTRATE, TIMOLOL MALEATE 2; 5 MG/ML; MG/ML
1 SOLUTION/ DROPS OPHTHALMIC 2 TIMES DAILY
Qty: 5 ML | Refills: 3 | Status: SHIPPED | OUTPATIENT
Start: 2025-04-06

## 2025-04-06 RX ORDER — LATANOPROST 50 UG/ML
1 SOLUTION/ DROPS OPHTHALMIC NIGHTLY
Qty: 2.5 ML | Refills: 2 | Status: SHIPPED | OUTPATIENT
Start: 2025-04-06

## 2025-04-06 RX ORDER — ALENDRONATE SODIUM TABLET 35 MG/1
35 TABLET ORAL WEEKLY
Qty: 12 TABLET | Refills: 1 | Status: SHIPPED | OUTPATIENT
Start: 2025-04-06

## 2025-04-06 RX ORDER — LATANOPROST 50 UG/ML
1 SOLUTION/ DROPS OPHTHALMIC NIGHTLY
Qty: 2.5 ML | Refills: 2 | Status: SHIPPED | OUTPATIENT
Start: 2025-04-06 | End: 2025-04-06

## 2025-04-06 RX ORDER — DOCUSATE SODIUM 100 MG/1
100 CAPSULE, LIQUID FILLED ORAL 2 TIMES DAILY
Qty: 180 CAPSULE | Refills: 3 | Status: SHIPPED | OUTPATIENT
Start: 2025-04-06

## 2025-04-06 RX ORDER — PRAVASTATIN SODIUM 40 MG/1
40 TABLET ORAL NIGHTLY
Qty: 90 TABLET | Refills: 3 | Status: SHIPPED | OUTPATIENT
Start: 2025-04-06

## 2025-04-06 RX ORDER — DOCUSATE SODIUM 100 MG/1
100 CAPSULE, LIQUID FILLED ORAL 2 TIMES DAILY
Qty: 180 CAPSULE | Refills: 3 | Status: SHIPPED | OUTPATIENT
Start: 2025-04-06 | End: 2025-04-06

## 2025-04-06 RX ORDER — BRIMONIDINE TARTRATE, TIMOLOL MALEATE 2; 5 MG/ML; MG/ML
1 SOLUTION/ DROPS OPHTHALMIC 2 TIMES DAILY
Qty: 5 ML | Refills: 3 | Status: SHIPPED | OUTPATIENT
Start: 2025-04-06 | End: 2025-04-06

## 2025-04-06 RX ORDER — VIT C/E/ZN/COPPR/LUTEIN/ZEAXAN 250MG-90MG
1 CAPSULE ORAL 2 TIMES DAILY
Qty: 180 CAPSULE | Refills: 3 | Status: SHIPPED | OUTPATIENT
Start: 2025-04-06

## 2025-04-06 RX ORDER — PRAVASTATIN SODIUM 40 MG/1
40 TABLET ORAL NIGHTLY
Qty: 90 TABLET | Refills: 3 | Status: SHIPPED | OUTPATIENT
Start: 2025-04-06 | End: 2025-04-06

## 2025-04-06 RX ORDER — GABAPENTIN 100 MG/1
100 CAPSULE ORAL 2 TIMES DAILY
Qty: 180 CAPSULE | Refills: 3 | Status: SHIPPED | OUTPATIENT
Start: 2025-04-06 | End: 2026-04-06

## 2025-04-06 RX ORDER — AMIODARONE HYDROCHLORIDE 200 MG/1
100 TABLET ORAL DAILY
Qty: 45 TABLET | Refills: 3 | Status: SHIPPED | OUTPATIENT
Start: 2025-04-06 | End: 2025-04-06

## 2025-04-06 RX ORDER — METOPROLOL SUCCINATE 50 MG/1
25 TABLET, EXTENDED RELEASE ORAL DAILY
Qty: 90 TABLET | Refills: 3 | Status: SHIPPED | OUTPATIENT
Start: 2025-04-06 | End: 2025-04-06

## 2025-04-06 RX ORDER — METOPROLOL SUCCINATE 50 MG/1
25 TABLET, EXTENDED RELEASE ORAL DAILY
Qty: 90 TABLET | Refills: 3 | Status: SHIPPED | OUTPATIENT
Start: 2025-04-06

## 2025-04-06 RX ORDER — AMLODIPINE BESYLATE 5 MG/1
5 TABLET ORAL DAILY
Qty: 90 TABLET | Refills: 3 | Status: SHIPPED | OUTPATIENT
Start: 2025-04-06 | End: 2025-04-06

## 2025-04-06 RX ORDER — TIRZEPATIDE 5 MG/.5ML
5 INJECTION, SOLUTION SUBCUTANEOUS
Qty: 2 ML | Refills: 2 | Status: SHIPPED | OUTPATIENT
Start: 2025-04-06

## 2025-04-06 RX ORDER — AMLODIPINE BESYLATE 5 MG/1
5 TABLET ORAL DAILY
Qty: 90 TABLET | Refills: 3 | Status: SHIPPED | OUTPATIENT
Start: 2025-04-06 | End: 2026-04-06

## 2025-04-06 RX ORDER — ALENDRONATE SODIUM TABLET 35 MG/1
35 TABLET ORAL WEEKLY
Qty: 12 TABLET | Refills: 1 | Status: SHIPPED | OUTPATIENT
Start: 2025-04-06 | End: 2025-04-06

## 2025-04-06 RX ORDER — AMIODARONE HYDROCHLORIDE 200 MG/1
100 TABLET ORAL DAILY
Qty: 45 TABLET | Refills: 3 | Status: SHIPPED | OUTPATIENT
Start: 2025-04-06

## 2025-04-06 NOTE — PROGRESS NOTES
[facility] [place] [facility]  Medical Chronic Care Home Visit    Encounter Provider: Delicia Yañez   PCP: Anders Chanel MD  Consult Requested By: Dasha Terrazas    HISTORY OF PRESENT ILLNESS      Patient ID: Yajaira Terry is a 79 y.o. female is being seen in the home due to physical debility that presents a taxing effort to leave the home, to mitigate high risk of hospital readmission and/or due to the limited availability of reliable or safe options for transportation to the point of access to the provider. Prior to treatment on this visit the chart was reviewed and patient verbal consent was obtained.    Chronic medical conditions synopsis:  Yajaira Terry is an 79 y.o. female with a medical history per problem list. Patient is status post ground level fall sustaining bilateral proximal humerus fractures and a right tibial plateau fracture on 9/14. Bilateral proximal humerus fractures were treated nonoperatively; patient status post ORIF right tibial plateau with Dr. Reddy on 09/19/2024. She had since undergone right lower leg I and D on December 17th with Dr. Reddy with secondary wound closure. Cultures from that day grew staph for which ID was consulted and recommended oxacillin 2 g IV q.4 hours. She ended her antibiotic duration on 12/31/2024. She also completed a 4 week course of Keflex.    Today her incision are healed and she has been following up appropriately with Infectious Disease. PT per [facility].       DECISION MAKING TODAY       Assessment & Plan:  1. Atrial fibrillation with RVR  Assessment & Plan:  HR 67.   - monitor HR  - continue coumadin  - F/U w/ PCP and/or Cardiology       2. Chronic HFrEF (heart failure with reduced ejection fraction)  Overview:  Results for orders placed during the hospital encounter of 08/03/22    Echo    Interpretation Summary  · The left ventricle is moderately enlarged with normal systolic function.  · The estimated ejection fraction is 55%.  · Normal  left ventricular diastolic function.  · Mild-to-moderate mitral regurgitation.  · Mild tricuspid regurgitation.  · Mild to moderate pulmonic regurgitation.  · Normal right ventricular size with normal right ventricular systolic function.  · There is no pulmonary hypertension.     Assessment & Plan:  Stable.   - Monitor daily weights.    - Continue low sodium diet  - Continue fluid restriction of 1.5 L   - F/U w/ PCP and Cardiology      Orders:  -     Ambulatory referral/consult to [facility] - Medical    3. CKD (chronic kidney disease), stage IV  Overview:  - history of unilateral nephrectomy due to renal cell carcinoma by Dr Emilia Lenz at [facility] (urology)    Assessment & Plan:  Last recorded GFR 55. Stable.   Based on current GFR, CKD stage is stage 3 - GFR 30-59.  Monitor UOP and serial BMP and adjust therapy as needed. Renally dose meds. Avoid nephrotoxic medications and procedures. F/U w/ PCP     Orders:  -     Ambulatory referral/consult to [facility] - Medical    4. Chronic systolic heart failure  -     Ambulatory referral/consult to [facility] - [facility]    5. Stage 3a chronic kidney disease  Overview:  - history of unilateral nephrectomy due to renal cell carcinoma by Dr Emilia Lenz at [facility] (urology)    Assessment & Plan:  Last recorded GFR 55. Stable.   Based on current GFR, CKD stage is stage 3 - GFR 30-59.  Monitor UOP and serial BMP and adjust therapy as needed. Renally dose meds. Avoid nephrotoxic medications and procedures. F/U w/ PCP       6. Type 2 diabetes mellitus with stage 3a chronic kidney disease, with long-term current use of insulin  Assessment & Plan:  Lab Results   Component Value Date    HGBA1C 5.7 (H) 12/12/2024    Discussed with patient routine diabetic care that includes but are not limited to regular eye exams, daily foot exams, proper nutrition, regular BG monitoring at home (fasting and mealtime) and medication compliance in a diabetic.  Target morning  BS  and meal-time BS <180. Stable.  - monitor FSG  - ADA diet   - continue lantus, mounjaro  - F/U w/ PCP              ENVIRONMENT OF CARE      Family and/or Caregiver present at visit?  Yes  Name of Caregiver:   History provided by: patient    4Ms for Medical Decision-Making in Older Adults    Last Completed EAWV:  None    MEDICATIONS:  High Risk Medications:  Total Active Medications: 1  traMADoL - 50 mg, 50 mg    MOBILITY:  Activities of Daily Living:       No data to display              Fall Risk:      8/16/2023     2:20 PM 5/30/2023     9:40 AM 2/6/2023     2:20 PM   Fall Risk Assessment - Outpatient   Mobility Status Ambulatory w/ assistance Ambulatory w/ assistance Ambulatory w/ assistance   Number of falls 0 1 1   Identified as fall risk True True True     Disability Status:      7/19/2017     8:00 AM   Disability Status   Are you deaf or do you have serious difficulty hearing? N   Are you blind or do you have serious difficulty seeing, even when wearing glasses? N   Because of a physical, mental, or emotional condition, do you have serious difficulty concentrating, remembering, or making decisions? N   Do you have serious difficulty walking or climbing stairs? Y   Do you have difficulty dressing or bathing? N   Because of a physical, mental, or emotional condition, do you have difficulty doing errands alone such as visiting a doctor's office or shopping? Y     Nutrition Screening:       No data to display             Screening Score: 0-7 Malnourished, 8-11 At Risk, 12-14 Normal  Get Up and Go:       No data to display              Whisper Test:       No data to display                    MENTATION:   Has Dementia Dx: No  Has Anxiety Dx: No    Depression Patient Health Questionnaire:      2/6/2023     2:34 PM   Depression Patient Health Questionnaire   Over the last two weeks how often have you been bothered by little interest or pleasure in doing things Several days    Over the last two weeks how  often have you been bothered by feeling down, depressed or hopeless Several days   PHQ-2 Total Score 2       Data saved with a previous flowsheet row definition     Cognitive Function Screening:       No data to display              Cognitive Function Screening Total - Less than 4 = Abnormal,  Greater than or equal to 4 = Normal        WHAT MATTERS MOST:  Advance Care Planning   ACP Status:   Patient has had an ACP conversation  Living Will: No  Power of : No  [place]: No    What is most important right now is to focus on comfort and QOL     Accordingly, we have decided that the best plan to meet the patient's goals includes continuing with treatment              Is patient hospice appropriate: No  (If needed, use PPS <30 or FAST score >7)  Was referral to hospice placed: No    Impression upon entering the home:  Physical Dwelling: single family home   Appearance of home environment: cleaniness: clean  Functional Status: independent  Mobility: ambulatory with device  Nutritional access: adequate intake and access  Home Health: Yes,  Agency [facility]     DME/Supplies: none     Diagnostic tests reviewed/disposition: No diagnosic tests pending after this hospitalization.  Disease/illness education: Diabetes  Establishment or re-establishment of referral orders for community resources: No other necessary community resources.   Discussion with other health care providers: No discussion with other health care providers necessary.   Does patient have a PCP at [facility]? Yes   Repatriation plan with PCP? follow-up with PCP within 30d   Does patient have an ostomy (ileostomy, colostomy, suprapubic catheter, nephrostomy tube, tracheostomy, PEG tube, pleurex catheter, cholecystostomy, etc)? No  Were BPAs reviewed? Yes    Social History     Socioeconomic History    Marital status:    Tobacco Use    Smoking status: Former     Current packs/day: 0.00     Types: Cigarettes     Quit date: 5/19/2017     Years since  quittin.8     Passive exposure: Past    Smokeless tobacco: Never   Substance and Sexual Activity    Alcohol use: No    Drug use: No    Sexual activity: Not Currently     Social Drivers of Health     Financial Resource Strain: Low Risk  (2/3/2023)    Overall Financial Resource Strain (CARDIA)     Difficulty of Paying Living Expenses: Not hard at all   Food Insecurity: Patient Declined (2024)    Received from [facility]    Hunger Vital Sign     Worried About Running Out of Food in the Last Year: Patient declined     Ran Out of Food in the Last Year: Patient declined   Transportation Needs: Patient Declined (2024)    Received from [facility]    PRAPARE - Transportation     Lack of Transportation (Medical): Patient declined     Lack of Transportation (Non-Medical): Patient declined   Physical Activity: Inactive (2/3/2023)    Exercise Vital Sign     Days of Exercise per Week: 0 days     Minutes of Exercise per Session: 0 min   Stress: No Stress Concern Present (2/3/2023)    [employer] - Occupational Stress Questionnaire     Feeling of Stress : Not at all   Housing Stability: Patient Declined (2024)    Received from [facility]    Housing Stability Vital Sign     Unable to Pay for Housing in the Last Year: Patient declined     Number of Times Moved in the Last Year: 1     Homeless in the Last Year: Patient declined         OBJECTIVE:     Vital Signs:  Vitals:    25 1310   BP: (!) 147/75   Pulse: 67   Resp: 17   Temp: 97.9 °F (36.6 °C)       Review of Systems   Constitutional:  Positive for activity change. Negative for chills and fever.   HENT:  Negative for congestion, rhinorrhea and trouble swallowing.    Eyes:  Negative for visual disturbance.   Respiratory:  Positive for shortness of breath. Negative for cough.         SOB with exertion    Cardiovascular:  Negative for chest pain.   Gastrointestinal:  Negative for abdominal pain, constipation, diarrhea,  nausea and vomiting.   Endocrine: Negative for cold intolerance and heat intolerance.   Genitourinary:  Negative for difficulty urinating and dysuria.   Musculoskeletal:  Positive for back pain.   Skin:  Negative for color change.   Allergic/Immunologic: Negative for environmental allergies.   Neurological:  Negative for dizziness and headaches.   Psychiatric/Behavioral:  Negative for agitation, hallucinations and suicidal ideas.        Physical Exam:  Physical Exam  Constitutional:       General: She is not in acute distress.     Appearance: Normal appearance. She is obese.   HENT:      Head: Normocephalic and atraumatic.      Nose: No rhinorrhea.   Eyes:      General: No scleral icterus.  Cardiovascular:      Rate and Rhythm: Normal rate.   Pulmonary:      Effort: Pulmonary effort is normal. No respiratory distress.      Breath sounds: Normal breath sounds.   Abdominal:      General: Bowel sounds are normal. There is no distension.      Palpations: Abdomen is soft.      Tenderness: There is no abdominal tenderness.   Musculoskeletal:         General: Swelling present.      Right lower leg: Edema present.      Left lower leg: Edema present.   Skin:     General: Skin is warm and dry.   Neurological:      Mental Status: She is alert and oriented to person, place, and time.      Motor: Weakness present.         INSTRUCTIONS FOR PATIENT:     Scheduled Follow-up, Appts Reviewed with Modifications if Needed: Yes  No future appointments.    Current Medications[1]    Medication Reconciliation:  Were medications changed during this appointment? No  Were medications in the home? Yes  Is the patient taking the medications as directed? Yes  Does the patient/caregiver understand the medications and changes? Yes  Does updated med list accurately reflects meds patient is currently taking? Yes    Take all medications as prescribed  Keep all follow-up appointments  Return to the hospital or call your primary care provider if any  worsening symptoms such as fever, chest pain, shortness of breath, return of symptoms, or any other concerns.      Signature: MINI Phan         [1]   Current Outpatient Medications:     amiodarone (PACERONE) 200 MG Tab, Take 1 tablet (200 mg total) by mouth once daily. (Patient taking differently: Take 100 mg by mouth once daily.), Disp: 90 tablet, Rfl: 3    warfarin (COUMADIN) 3 MG tablet, TAKE 1 TABLET BY MOUTH DAILY AS DIRECTED BY COUMADIN CLINIC, Disp: 90 tablet, Rfl: 3    acetaminophen (TYLENOL) 325 MG tablet, Take 325 mg by mouth every 6 (six) hours as needed for Pain., Disp: , Rfl:     alendronate (FOSAMAX) 35 MG tablet, Take 1 tablet (35 mg total) by mouth once a week., Disp: 12 tablet, Rfl: 1    blood sugar diagnostic (TRUE METRIX GLUCOSE TEST STRIP) Strp, To check BG 2 times daily, to use with insurance preferred meter, Disp: 200 strip, Rfl: 3    blood-glucose meter (TRUE METRIX GLUCOSE METER) Misc, 1 each by Misc.(Non-Drug; Combo Route) route as needed (to check blood sugar)., Disp: 1 each, Rfl: 0    blood-glucose meter kit, To check BG 2 times daily, to use with insurance preferred meter, Disp: 180 each, Rfl: 3    COMBIGAN 0.2-0.5 % Drop, Place 1 drop into both eyes 2 (two) times daily., Disp: , Rfl:     docusate sodium (COLACE) 100 MG capsule, Take 100 mg by mouth 2 (two) times daily., Disp: , Rfl:     insulin (LANTUS SOLOSTAR U-100 INSULIN) glargine 100 units/mL SubQ pen, INJECT 60 UNITS SUBCUTANEOUSLY ONCE DAILY. (Patient not taking: Reported on 4/4/2025), Disp: 60 mL, Rfl: 6    lancets Mis, To check BG 2 times daily, to use with insurance preferred meter, Disp: 200 each, Rfl: 3    latanoprost 0.005 % ophthalmic solution, PLACE 1 DROP INTO LEFT EYE AT BEDTIME, Disp: , Rfl:     metoprolol succinate (TOPROL-XL) 50 MG 24 hr tablet, Take 0.5 tablets (25 mg total) by mouth once daily., Disp: 90 tablet, Rfl: 3    OZEMPIC 0.25 mg or 0.5 mg (2 mg/3 mL) pen injector, INJECT 0.5MG INTO SKIN EVERY  "7 DAYS (Patient not taking: Reported on 4/4/2025), Disp: 9 each, Rfl: 10    pen needle, diabetic 33 gauge x 5/16" Ndle, 1 Units by Misc.(Non-Drug; Combo Route) route 3 (three) times daily as needed (to inject insulin)., Disp: 300 each, Rfl: 6    pravastatin (PRAVACHOL) 40 MG tablet, Take 1 tablet (40 mg total) by mouth every evening., Disp: 90 tablet, Rfl: 3    traMADoL (ULTRAM) 50 mg tablet, TAKE 1 TABLET BY MOUTH EVERY 12 HOURS AS NEEDED FOR PAIN (Patient not taking: Reported on 4/4/2025), Disp: 60 tablet, Rfl: 2    traMADoL (ULTRAM) 50 mg tablet, Take 1 tablet (50 mg total) by mouth every 12 (twelve) hours as needed for Pain. (Patient not taking: Reported on 4/4/2025), Disp: 12 tablet, Rfl: 0    TRUE METRIX GLUCOSE TEST STRIP Strp, TEST BLOOD SUGAR TWICE DAILY, Disp: 200 strip, Rfl: 0    TRUEPLUS LANCETS 33 gauge Misc, , Disp: , Rfl:     "

## 2025-04-07 NOTE — ASSESSMENT & PLAN NOTE
HR 67.   - monitor HR  - continue coumadin  - F/U w/ PCP and/or Cardiology    [Joint Pain] : joint pain [Negative] : Heme/Lymph

## 2025-04-07 NOTE — ASSESSMENT & PLAN NOTE
Lab Results   Component Value Date    HGBA1C 5.7 (H) 12/12/2024    Discussed with patient routine diabetic care that includes but are not limited to regular eye exams, daily foot exams, proper nutrition, regular BG monitoring at home (fasting and mealtime) and medication compliance in a diabetic.  Target morning BS  and meal-time BS <180. Stable.  - monitor FSG  - monitor UOP   - ADA diet   - continue lantus, mounjaro  - renally dose medications  - avoid nephrotoxin medications and procedures  - F/U w/ PCP

## 2025-04-07 NOTE — ASSESSMENT & PLAN NOTE
Stable.   - Monitor daily weights.    - Continue low sodium diet  - Continue fluid restriction of 1.5 L   - F/U w/ PCP and Cardiology

## 2025-04-07 NOTE — ASSESSMENT & PLAN NOTE
Last recorded GFR 55. Stable.   Based on current GFR, CKD stage is stage 3 - GFR 30-59.  Monitor UOP and serial BMP and adjust therapy as needed. Renally dose meds. Avoid nephrotoxic medications and procedures. F/U w/ PCP

## 2025-04-08 ENCOUNTER — TELEPHONE (OUTPATIENT)
Dept: HOME HEALTH SERVICES | Facility: CLINIC | Age: 80
End: 2025-04-08
Payer: MEDICARE

## 2025-04-08 DIAGNOSIS — I48.91 ATRIAL FIBRILLATION WITH RVR: Primary | ICD-10-CM

## 2025-04-08 DIAGNOSIS — I50.22 CHRONIC HFREF (HEART FAILURE WITH REDUCED EJECTION FRACTION): ICD-10-CM

## 2025-04-08 DIAGNOSIS — Z91.81 AT RISK FOR FALLS: ICD-10-CM

## 2025-04-08 NOTE — TELEPHONE ENCOUNTER
Orders placed per NP request for add on OT services.  Orders faxed to Dale Raygoza.  Fax confirmation received.

## 2025-04-09 ENCOUNTER — TELEPHONE (OUTPATIENT)
Dept: HOME HEALTH SERVICES | Facility: CLINIC | Age: 80
End: 2025-04-09
Payer: MEDICARE

## 2025-04-21 ENCOUNTER — TELEPHONE (OUTPATIENT)
Dept: HOME HEALTH SERVICES | Facility: CLINIC | Age: 80
End: 2025-04-21
Payer: MEDICARE

## 2025-04-21 DIAGNOSIS — I48.91 ATRIAL FIBRILLATION WITH RVR: ICD-10-CM

## 2025-04-21 DIAGNOSIS — M79.662 PAIN OF LEFT CALF: Primary | ICD-10-CM

## 2025-04-21 RX ORDER — AMIODARONE HYDROCHLORIDE 200 MG/1
100 TABLET ORAL DAILY
Qty: 45 TABLET | Refills: 3 | Status: SHIPPED | OUTPATIENT
Start: 2025-04-21

## 2025-04-21 RX ORDER — ALENDRONATE SODIUM TABLET 35 MG/1
35 TABLET ORAL WEEKLY
Qty: 12 TABLET | Refills: 1 | Status: SHIPPED | OUTPATIENT
Start: 2025-04-21

## 2025-04-21 RX ORDER — DOCUSATE SODIUM 100 MG/1
100 CAPSULE, LIQUID FILLED ORAL 2 TIMES DAILY
Qty: 180 CAPSULE | Refills: 3 | Status: SHIPPED | OUTPATIENT
Start: 2025-04-21

## 2025-04-21 RX ORDER — PRAVASTATIN SODIUM 40 MG/1
40 TABLET ORAL NIGHTLY
Qty: 90 TABLET | Refills: 3 | Status: SHIPPED | OUTPATIENT
Start: 2025-04-21

## 2025-04-21 RX ORDER — LATANOPROST 50 UG/ML
1 SOLUTION/ DROPS OPHTHALMIC NIGHTLY
Qty: 2.5 ML | Refills: 2 | Status: SHIPPED | OUTPATIENT
Start: 2025-04-21

## 2025-04-21 RX ORDER — AMLODIPINE BESYLATE 5 MG/1
5 TABLET ORAL DAILY
Qty: 90 TABLET | Refills: 3 | Status: SHIPPED | OUTPATIENT
Start: 2025-04-21 | End: 2026-04-21

## 2025-04-21 RX ORDER — VIT C/E/ZN/COPPR/LUTEIN/ZEAXAN 250MG-90MG
1 CAPSULE ORAL 2 TIMES DAILY
Qty: 180 CAPSULE | Refills: 3 | Status: SHIPPED | OUTPATIENT
Start: 2025-04-21 | End: 2025-04-23 | Stop reason: SDUPTHER

## 2025-04-21 RX ORDER — METOPROLOL SUCCINATE 50 MG/1
25 TABLET, EXTENDED RELEASE ORAL DAILY
Qty: 90 TABLET | Refills: 3 | Status: SHIPPED | OUTPATIENT
Start: 2025-04-21

## 2025-04-21 RX ORDER — TIRZEPATIDE 5 MG/.5ML
5 INJECTION, SOLUTION SUBCUTANEOUS
Qty: 2 ML | Refills: 2 | Status: SHIPPED | OUTPATIENT
Start: 2025-04-21

## 2025-04-21 RX ORDER — BRIMONIDINE TARTRATE, TIMOLOL MALEATE 2; 5 MG/ML; MG/ML
1 SOLUTION/ DROPS OPHTHALMIC 2 TIMES DAILY
Qty: 5 ML | Refills: 3 | Status: SHIPPED | OUTPATIENT
Start: 2025-04-21

## 2025-04-21 RX ORDER — GABAPENTIN 100 MG/1
100 CAPSULE ORAL 2 TIMES DAILY
Qty: 180 CAPSULE | Refills: 3 | Status: SHIPPED | OUTPATIENT
Start: 2025-04-21 | End: 2026-04-21

## 2025-04-21 NOTE — TELEPHONE ENCOUNTER
Received and email from Dale LIRA  requesting that medications be sent to [facility] in [place] for refills.  Also received a call from patient as well requesting this stating that [facility] doesn't take her insurance and it needs to go to [facility]. NP notified and medications pended to NP at correct Pharmacy.

## 2025-04-21 NOTE — TELEPHONE ENCOUNTER
Patient called regarding her medication that she stated should have been sent to [facility] pharmacy in [place] mentioned it was previously sent to [facility]. She states she has been out of her medication for 2-3 weeks now and would like a call back from the NP with care at home to discuss further.    I have sent a message to the NP (MINI Carlson and clinical support nurse Phani Wong LPN) for call back

## 2025-04-23 RX ORDER — VIT C/E/ZN/COPPR/LUTEIN/ZEAXAN 250MG-90MG
1 CAPSULE ORAL 2 TIMES DAILY
Qty: 180 CAPSULE | Refills: 3 | Status: SHIPPED | OUTPATIENT
Start: 2025-04-23

## 2025-04-29 ENCOUNTER — NURSE TRIAGE (OUTPATIENT)
Dept: ADMINISTRATIVE | Facility: CLINIC | Age: 80
End: 2025-04-29
Payer: MEDICARE

## 2025-04-30 NOTE — TELEPHONE ENCOUNTER
Pt is calling and states that she ate dessert and it was very sweet, and her sugar was 503. Pt denies having any symptoms, other than the back of her head hurts a little bit.    Pt stated that when she saw her BS level was 503, she immediately took 30 Units of her Novolog.  Requested pt to take her BS while she was on the phone with me, and she just took BS and its 195. Pt states that her head is feeling better too. Dispo- Home care. Advised pt to call back with any further concerns or questions.       Reason for Disposition   Blood glucose  mg/dL (3.9 -13.3 mmol/L)    Additional Information   Negative: Unconscious or difficult to awaken   Negative: Acting confused (e.g., disoriented, slurred speech)   Negative: Very weak (e.g., can't stand)   Negative: Sounds like a life-threatening emergency to the triager   Negative: [1] Vomiting AND [2] signs of dehydration (e.g., very dry mouth, lightheaded, dark urine)   Negative: [1] Blood glucose > 240 mg/dL (13.3 mmol/L) AND [2] rapid breathing   Negative: Blood glucose > 500 mg/dL (27.8 mmol/L)   Negative: [1] Blood glucose > 240 mg/dL (13.3 mmol/L) AND [2] urine ketones moderate-large (or more than 1+)   Negative: [1] Blood glucose > 240 mg/dL (13.3 mmol/L) AND [2] blood ketones > 1.4 mmol/L   Negative: [1] Blood glucose > 240 mg/dL (13.3 mmol/L) AND [2] vomiting AND [3] unable to check for ketones (in blood or urine)   Negative: [1] New-onset diabetes suspected (e.g., frequent urination, weak, weight loss) AND [2] vomiting or rapid breathing   Negative: Vomiting lasts > 4 hours   Negative: Patient sounds very sick or weak to the triager   Negative: Fever > 100.4 F (38.0 C)   Negative: Blood glucose > 400 mg/dL (22.2 mmol/L)   Negative: [1] Blood glucose > 300 mg/dL (16.7 mmol/L) AND [2] two or more times in a row   Negative: Urine ketones moderate - large (or blood ketones > 1.4 mmol/L)   Negative: [1] Symptoms of high blood sugar (e.g., increased thirst, frequent  urination, weight loss) AND [2] not able to test blood glucose AND [3] pregnant   Negative: [1] Caller has URGENT medication or insulin device (e.g., pump, continuous monitoring) question AND [2] triager unable to answer question   Negative: [1] Blood glucose > 240 mg/dL (13.3 mmol/L) AND [2] pregnant   Negative: [1] Symptoms of high blood sugar (e.g., increased thirst, frequent urination, weight loss) AND [2] not able to test blood glucose   Negative: New-onset diabetes suspected (e.g., increased thirst, frequent urination, weight loss)   Negative: [1] Caller has NON-URGENT medication or insulin device (e.g., pump, continuous monitoring) question AND [2] triager unable to answer question   Negative: [1] Blood glucose > 300 mg/dL (16.7 mmol/L) AND [2] uses insulin (e.g., insulin-dependent, all people with type 1 diabetes)   Negative: [1] Blood glucose 240 - 300 mg/dL (13.3 - 16.7 mmol/L) AND [2] uses insulin (e.g., insulin-dependent, all people with type 1 diabetes)   Negative: [1] Blood glucose > 300 mg/dL (16.7 mmol/L) AND [2] does not  use insulin (e.g., not insulin-dependent; most people with type 2 diabetes)   Negative: [1] Blood glucose 240 - 300 mg/dL (13.3 - 16.7 mmol/L) AND [2] does not  use insulin (e.g., not insulin-dependent; most people with type 2 diabetes)    Protocols used: Diabetes - High Blood Sugar-A-

## 2025-05-20 ENCOUNTER — PATIENT MESSAGE (OUTPATIENT)
Dept: HOME HEALTH SERVICES | Facility: CLINIC | Age: 80
End: 2025-05-20
Payer: MEDICARE

## 2025-05-20 ENCOUNTER — TELEPHONE (OUTPATIENT)
Dept: FAMILY MEDICINE | Facility: CLINIC | Age: 80
End: 2025-05-20
Payer: MEDICARE

## 2025-05-20 NOTE — TELEPHONE ENCOUNTER
Spoke to pt. Pt stated that she needed refills from Dr. Chanel. The nurse practitioner from [facility] came to her house and sent in refills in April.     Spoke with [facility] pharmacy. The medication claim was processed through the pt's insurance as covered and not needing any further for it.     Called pt back. Pt verbally understood.

## 2025-05-20 NOTE — TELEPHONE ENCOUNTER
Phani Wong LPN [facility]  Caller: Unspecified (Today, 12:46 PM)  Good Afternoon,  Patient is requesting medication refills.  Patient was instructed to follow up with Dr. Leon and request refills through him.  Please assist with patient getting set up for a follow up visit with him for medications refill.  Thank you so much in advance for your assistance.    Thank you,  Alexis Perrier, LPN [facility]

## 2025-07-02 ENCOUNTER — DOCUMENT SCAN (OUTPATIENT)
Dept: HOME HEALTH SERVICES | Facility: HOSPITAL | Age: 80
End: 2025-07-02
Payer: MEDICARE